# Patient Record
Sex: FEMALE | Race: WHITE | HISPANIC OR LATINO | URBAN - METROPOLITAN AREA
[De-identification: names, ages, dates, MRNs, and addresses within clinical notes are randomized per-mention and may not be internally consistent; named-entity substitution may affect disease eponyms.]

---

## 2024-09-09 ENCOUNTER — INPATIENT (INPATIENT)
Facility: HOSPITAL | Age: 44
LOS: 3 days | Discharge: HOME | DRG: 812 | End: 2024-09-13
Attending: INTERNAL MEDICINE | Admitting: INTERNAL MEDICINE
Payer: MEDICAID

## 2024-09-09 VITALS
TEMPERATURE: 98 F | RESPIRATION RATE: 19 BRPM | SYSTOLIC BLOOD PRESSURE: 221 MMHG | WEIGHT: 220.02 LBS | DIASTOLIC BLOOD PRESSURE: 71 MMHG | OXYGEN SATURATION: 100 % | HEART RATE: 108 BPM | HEIGHT: 62 IN

## 2024-09-09 DIAGNOSIS — R71.0 PRECIPITOUS DROP IN HEMATOCRIT: ICD-10-CM

## 2024-09-09 DIAGNOSIS — N92.0 EXCESSIVE AND FREQUENT MENSTRUATION WITH REGULAR CYCLE: ICD-10-CM

## 2024-09-09 DIAGNOSIS — D64.9 ANEMIA, UNSPECIFIED: ICD-10-CM

## 2024-09-09 DIAGNOSIS — R16.0 HEPATOMEGALY, NOT ELSEWHERE CLASSIFIED: ICD-10-CM

## 2024-09-09 LAB
ALBUMIN SERPL ELPH-MCNC: 3.7 G/DL — SIGNIFICANT CHANGE UP (ref 3.3–5)
ALP SERPL-CCNC: 62 U/L — SIGNIFICANT CHANGE UP (ref 40–120)
ALT FLD-CCNC: 16 U/L — SIGNIFICANT CHANGE UP (ref 10–45)
ANION GAP SERPL CALC-SCNC: 12 MMOL/L — SIGNIFICANT CHANGE UP (ref 5–17)
ANISOCYTOSIS BLD QL: SIGNIFICANT CHANGE UP
APTT BLD: 30.2 SEC — SIGNIFICANT CHANGE UP (ref 24.5–35.6)
AST SERPL-CCNC: 31 U/L — SIGNIFICANT CHANGE UP (ref 10–40)
BASOPHILS # BLD AUTO: 0 K/UL — SIGNIFICANT CHANGE UP (ref 0–0.2)
BASOPHILS # BLD AUTO: 0.05 K/UL — SIGNIFICANT CHANGE UP (ref 0–0.2)
BASOPHILS NFR BLD AUTO: 0 % — SIGNIFICANT CHANGE UP (ref 0–2)
BASOPHILS NFR BLD AUTO: 0.8 % — SIGNIFICANT CHANGE UP (ref 0–2)
BILIRUB SERPL-MCNC: 0.6 MG/DL — SIGNIFICANT CHANGE UP (ref 0.2–1.2)
BUN SERPL-MCNC: 14 MG/DL — SIGNIFICANT CHANGE UP (ref 7–23)
CALCIUM SERPL-MCNC: 8.3 MG/DL — LOW (ref 8.4–10.5)
CHLORIDE SERPL-SCNC: 103 MMOL/L — SIGNIFICANT CHANGE UP (ref 96–108)
CO2 SERPL-SCNC: 22 MMOL/L — SIGNIFICANT CHANGE UP (ref 22–31)
CREAT SERPL-MCNC: 0.72 MG/DL — SIGNIFICANT CHANGE UP (ref 0.5–1.3)
DACRYOCYTES BLD QL SMEAR: SLIGHT — SIGNIFICANT CHANGE UP
EGFR: 105 ML/MIN/1.73M2 — SIGNIFICANT CHANGE UP
ELLIPTOCYTES BLD QL SMEAR: SLIGHT — SIGNIFICANT CHANGE UP
EOSINOPHIL # BLD AUTO: 0.1 K/UL — SIGNIFICANT CHANGE UP (ref 0–0.5)
EOSINOPHIL # BLD AUTO: 0.17 K/UL — SIGNIFICANT CHANGE UP (ref 0–0.5)
EOSINOPHIL NFR BLD AUTO: 1.6 % — SIGNIFICANT CHANGE UP (ref 0–6)
EOSINOPHIL NFR BLD AUTO: 2.7 % — SIGNIFICANT CHANGE UP (ref 0–6)
GAS PNL BLDV: SIGNIFICANT CHANGE UP
GLUCOSE SERPL-MCNC: 227 MG/DL — HIGH (ref 70–99)
HCG SERPL-ACNC: <2 MIU/ML — SIGNIFICANT CHANGE UP
HCT VFR BLD CALC: 15.1 % — CRITICAL LOW (ref 34.5–45)
HCT VFR BLD CALC: 18.6 % — CRITICAL LOW (ref 34.5–45)
HCT VFR BLD CALC: 20.3 % — CRITICAL LOW (ref 34.5–45)
HGB BLD-MCNC: 3.7 G/DL — CRITICAL LOW (ref 11.5–15.5)
HGB BLD-MCNC: 5.2 G/DL — CRITICAL LOW (ref 11.5–15.5)
HGB BLD-MCNC: 5.6 G/DL — CRITICAL LOW (ref 11.5–15.5)
HYPOCHROMIA BLD QL: SIGNIFICANT CHANGE UP
IMM GRANULOCYTES NFR BLD AUTO: 1 % — HIGH (ref 0–0.9)
INR BLD: 1.39 RATIO — HIGH (ref 0.85–1.18)
LYMPHOCYTES # BLD AUTO: 0.96 K/UL — LOW (ref 1–3.3)
LYMPHOCYTES # BLD AUTO: 1.64 K/UL — SIGNIFICANT CHANGE UP (ref 1–3.3)
LYMPHOCYTES # BLD AUTO: 15 % — SIGNIFICANT CHANGE UP (ref 13–44)
LYMPHOCYTES # BLD AUTO: 26.3 % — SIGNIFICANT CHANGE UP (ref 13–44)
MAGNESIUM SERPL-MCNC: 2.2 MG/DL — SIGNIFICANT CHANGE UP (ref 1.6–2.6)
MANUAL SMEAR VERIFICATION: SIGNIFICANT CHANGE UP
MCHC RBC-ENTMCNC: 16.4 PG — LOW (ref 27–34)
MCHC RBC-ENTMCNC: 19.3 PG — LOW (ref 27–34)
MCHC RBC-ENTMCNC: 19.6 PG — LOW (ref 27–34)
MCHC RBC-ENTMCNC: 24.5 GM/DL — LOW (ref 32–36)
MCHC RBC-ENTMCNC: 27.6 GM/DL — LOW (ref 32–36)
MCHC RBC-ENTMCNC: 28 GM/DL — LOW (ref 32–36)
MCV RBC AUTO: 67.1 FL — LOW (ref 80–100)
MCV RBC AUTO: 70 FL — LOW (ref 80–100)
MCV RBC AUTO: 70.2 FL — LOW (ref 80–100)
MICROCYTES BLD QL: SIGNIFICANT CHANGE UP
MONOCYTES # BLD AUTO: 0.46 K/UL — SIGNIFICANT CHANGE UP (ref 0–0.9)
MONOCYTES # BLD AUTO: 0.85 K/UL — SIGNIFICANT CHANGE UP (ref 0–0.9)
MONOCYTES NFR BLD AUTO: 13.6 % — SIGNIFICANT CHANGE UP (ref 2–14)
MONOCYTES NFR BLD AUTO: 7.1 % — SIGNIFICANT CHANGE UP (ref 2–14)
NEUTROPHILS # BLD AUTO: 3.54 K/UL — SIGNIFICANT CHANGE UP (ref 1.8–7.4)
NEUTROPHILS # BLD AUTO: 4.84 K/UL — SIGNIFICANT CHANGE UP (ref 1.8–7.4)
NEUTROPHILS NFR BLD AUTO: 56.7 % — SIGNIFICANT CHANGE UP (ref 43–77)
NEUTROPHILS NFR BLD AUTO: 75.2 % — SIGNIFICANT CHANGE UP (ref 43–77)
NEUTS HYPERSEG # BLD: PRESENT — SIGNIFICANT CHANGE UP
NRBC # BLD: 0 /100 WBCS — SIGNIFICANT CHANGE UP (ref 0–0)
NRBC # BLD: 1 /100 WBCS — HIGH (ref 0–0)
NRBC # BLD: 1 /100 WBCS — HIGH (ref 0–0)
NT-PROBNP SERPL-SCNC: 991 PG/ML — HIGH (ref 0–300)
OB PNL STL: NEGATIVE — SIGNIFICANT CHANGE UP
OVALOCYTES BLD QL SMEAR: SIGNIFICANT CHANGE UP
PLAT MORPH BLD: NORMAL — SIGNIFICANT CHANGE UP
PLATELET # BLD AUTO: 379 K/UL — SIGNIFICANT CHANGE UP (ref 150–400)
PLATELET # BLD AUTO: 385 K/UL — SIGNIFICANT CHANGE UP (ref 150–400)
PLATELET # BLD AUTO: 405 K/UL — HIGH (ref 150–400)
POIKILOCYTOSIS BLD QL AUTO: SIGNIFICANT CHANGE UP
POTASSIUM SERPL-MCNC: 4.4 MMOL/L — SIGNIFICANT CHANGE UP (ref 3.5–5.3)
POTASSIUM SERPL-SCNC: 4.4 MMOL/L — SIGNIFICANT CHANGE UP (ref 3.5–5.3)
PROT SERPL-MCNC: 7.2 G/DL — SIGNIFICANT CHANGE UP (ref 6–8.3)
PROTHROM AB SERPL-ACNC: 15.1 SEC — HIGH (ref 9.5–13)
RBC # BLD: 2.25 M/UL — LOW (ref 3.8–5.2)
RBC # BLD: 2.65 M/UL — LOW (ref 3.8–5.2)
RBC # BLD: 2.9 M/UL — LOW (ref 3.8–5.2)
RBC # FLD: 18.8 % — HIGH (ref 10.3–14.5)
RBC # FLD: 21.2 % — HIGH (ref 10.3–14.5)
RBC # FLD: 21.2 % — HIGH (ref 10.3–14.5)
RBC BLD AUTO: ABNORMAL
SODIUM SERPL-SCNC: 137 MMOL/L — SIGNIFICANT CHANGE UP (ref 135–145)
STOMATOCYTES BLD QL SMEAR: SLIGHT — SIGNIFICANT CHANGE UP
TROPONIN T, HIGH SENSITIVITY RESULT: <6 NG/L — SIGNIFICANT CHANGE UP (ref 0–51)
WBC # BLD: 6.24 K/UL — SIGNIFICANT CHANGE UP (ref 3.8–10.5)
WBC # BLD: 6.43 K/UL — SIGNIFICANT CHANGE UP (ref 3.8–10.5)
WBC # BLD: 6.55 K/UL — SIGNIFICANT CHANGE UP (ref 3.8–10.5)
WBC # FLD AUTO: 6.24 K/UL — SIGNIFICANT CHANGE UP (ref 3.8–10.5)
WBC # FLD AUTO: 6.43 K/UL — SIGNIFICANT CHANGE UP (ref 3.8–10.5)
WBC # FLD AUTO: 6.55 K/UL — SIGNIFICANT CHANGE UP (ref 3.8–10.5)

## 2024-09-09 PROCEDURE — 99285 EMERGENCY DEPT VISIT HI MDM: CPT

## 2024-09-09 PROCEDURE — 70450 CT HEAD/BRAIN W/O DYE: CPT | Mod: 26,MC

## 2024-09-09 PROCEDURE — 71275 CT ANGIOGRAPHY CHEST: CPT | Mod: 26,MC

## 2024-09-09 PROCEDURE — 74174 CTA ABD&PLVS W/CONTRAST: CPT | Mod: 26,MC

## 2024-09-09 PROCEDURE — 72125 CT NECK SPINE W/O DYE: CPT | Mod: 26,MC

## 2024-09-09 PROCEDURE — 71045 X-RAY EXAM CHEST 1 VIEW: CPT | Mod: 26

## 2024-09-09 RX ORDER — ACETAMINOPHEN 325 MG/1
1000 TABLET ORAL ONCE
Refills: 0 | Status: COMPLETED | OUTPATIENT
Start: 2024-09-09 | End: 2024-09-09

## 2024-09-09 RX ADMIN — Medication 5 MILLIGRAM(S): at 10:00

## 2024-09-09 RX ADMIN — ACETAMINOPHEN 400 MILLIGRAM(S): 325 TABLET ORAL at 09:52

## 2024-09-09 NOTE — ED PROVIDER NOTE - ATTENDING CONTRIBUTION TO CARE
I, Keyur Chen MD, Emergency Medicine Attending Physician, personally saw and examined the patient and I personally made/approve the management plan and take responsibility for the patient management.    MDM: 44-year-old female with history of hypertension and pre-diabetes (not on medications, has not seen a physician for several years), who presents dyspnea on exertion, chest pain with radiation to the back, bilateral upper extremity tingling.  Patient had mechanical trip and fall over uneven sidewalk last Wednesday, but did not have any significant symptoms afterwards.  Patient states that on Friday she started having increased dyspnea exertion, and this morning she started have tingling to her upper extremities and chest pain.  Denies being on antiplatelets or anticoagulants.  Denies any pleuritic symptoms    ROS: denies LOC, syncope, head injury, neck pain, abdominal pain, back pain, weakness, vomiting, lightheadedness, vision changes, difficulty walking.    On examination, patient with significantly elevated blood pressure, mildly elevated heart rate, otherwise stable vitals. Cardiac examination with sinus tachycardia, lungs CTAB with no W/W/R.  ABD: Abdomen soft, non-tender and non-distended, no rebound, no guarding, no rigidity. No CVA tenderness.  There is no calf tenderness or leg swelling. Negative Reginald's sign.  Neurovascularly intact in all 4 extremities with 5/5 strength, normal sensation (including to the bilateral upper extremities), equal pulses and brisk capillary refill, soft compartments.  Cranial nerves III-XII intact, no pronator drift, normal speech and gait.    Due to patient's symptoms, will obtain CTA to evaluate for aortic dissection.  Bilateral blood pressure performed, 159/68 on the left, 155/69 on the right.  Will give patient labetalol due to elevated heart rate and blood pressure.  Given recent trauma, will obtain CT Head to evaluate for acute intracranial pathology, will obtain CT C-spine to evaluate for acute traumatic cervical spine injury.  Called CT at 9:45 AM to expedite imaging.    My independent interpretation of the EKG shows:  Normal sinus rhythm with rate of 92 bpm, incomplete RBBB, no ST elevations or depressions.  QTc 457.    Patient found to have significant anemia with hemoglobin 3.7 and hematocrit 15, has mild hyperglycemia to 27, troponin less than 6.  proBNP elevated 991, no lactate elevation.    Will give PRBC transfusion, patient denies any sites of active bleeding. I, Keyur Chen MD, Emergency Medicine Attending Physician, personally saw and examined the patient and I personally made/approve the management plan and take responsibility for the patient management.    MDM: 44-year-old female with history of hypertension and pre-diabetes (not on medications, has not seen a physician for several years), who presents dyspnea on exertion, chest pain with radiation to the back, bilateral upper extremity tingling.  Patient had mechanical trip and fall over uneven sidewalk last Wednesday, but did not have any significant symptoms afterwards.  Patient states that on Friday she started having increased dyspnea exertion, and this morning she started have tingling to her upper extremities and chest pain.  Denies being on antiplatelets or anticoagulants.  Denies any pleuritic symptoms    ROS: denies LOC, syncope, head injury, neck pain, abdominal pain, back pain, weakness, vomiting, lightheadedness, vision changes, difficulty walking.    On examination, patient with significantly elevated blood pressure, mildly elevated heart rate, otherwise stable vitals. Cardiac examination with sinus tachycardia, lungs CTAB with no W/W/R.  ABD: Abdomen soft, non-tender and non-distended, no rebound, no guarding, no rigidity. No CVA tenderness.  There is no calf tenderness or leg swelling. Negative Reginald's sign.  Neurovascularly intact in all 4 extremities with 5/5 strength, normal sensation (including to the bilateral upper extremities), equal pulses and brisk capillary refill, soft compartments.  Cranial nerves III-XII intact, no pronator drift, normal speech and gait.    Due to patient's symptoms, will obtain CTA to evaluate for aortic dissection.  Bilateral blood pressure performed, 159/68 on the left, 155/69 on the right.  Will give patient labetalol due to elevated heart rate and blood pressure.  Given recent trauma, will obtain CT Head to evaluate for acute intracranial pathology, will obtain CT C-spine to evaluate for acute traumatic cervical spine injury.  Called CT at 9:45 AM to expedite imaging.    My independent interpretation of the EKG shows:  Normal sinus rhythm with rate of 92 bpm, incomplete RBBB, no ST elevations or depressions.  QTc 457.    Patient found to have significant anemia with hemoglobin 3.7 and hematocrit 15, has mild hyperglycemia to 27, troponin less than 6.  proBNP elevated 991, no lactate elevation.    Will give PRBC transfusion, patient denies any sites of active bleeding. Rectal examination with no gross blood, no occult blood.  Chest x-ray with no focal consolidation.  CT showed no ICH, no acute cervical fracture, CTA chest and abdomen showed no evidence of aortic dissection, small pericardial effusion, trace bilateral pleural effusions, left hepatic lobe mass, right cardiophrenic angle nodule. I, Keyur Chen MD, Emergency Medicine Attending Physician, personally saw and examined the patient and I personally made/approve the management plan and take responsibility for the patient management.    MDM: 44-year-old female with history of hypertension and pre-diabetes (not on medications, has not seen a physician for several years), who presents dyspnea on exertion, chest pain with radiation to the back, bilateral upper extremity tingling.  Patient had mechanical trip and fall over uneven sidewalk last Wednesday, but did not have any significant symptoms afterwards.  Patient states that on Friday she started having increased dyspnea exertion, and this morning she started have tingling to her upper extremities and chest pain.  Denies being on antiplatelets or anticoagulants.  Denies any pleuritic symptoms    ROS: denies LOC, syncope, head injury, neck pain, abdominal pain, back pain, weakness, vomiting, lightheadedness, vision changes, difficulty walking.    On examination, patient with significantly elevated blood pressure, mildly elevated heart rate, otherwise stable vitals. Cardiac examination with sinus tachycardia, lungs CTAB with no W/W/R.  ABD: Abdomen soft, non-tender and non-distended, no rebound, no guarding, no rigidity. No CVA tenderness.  There is no calf tenderness or leg swelling. Negative Reginald's sign.  Neurovascularly intact in all 4 extremities with 5/5 strength, normal sensation (including to the bilateral upper extremities), equal pulses and brisk capillary refill, soft compartments.  Cranial nerves III-XII intact, no pronator drift, normal speech and gait.    Due to patient's symptoms, will obtain CTA to evaluate for aortic dissection.  Bilateral blood pressure performed, 159/68 on the left, 155/69 on the right.  Will give patient labetalol due to elevated heart rate and blood pressure.  Given recent trauma, will obtain CT Head to evaluate for acute intracranial pathology, will obtain CT C-spine to evaluate for acute traumatic cervical spine injury.  Called CT at 9:45 AM to expedite imaging.    My independent interpretation of the EKG shows:  Normal sinus rhythm with rate of 92 bpm, incomplete RBBB, no ST elevations or depressions.  QTc 457.    Patient found to have significant anemia with hemoglobin 3.7 and hematocrit 15, has mild hyperglycemia to 27, troponin less than 6.  proBNP elevated 991, no lactate elevation.    Will give PRBC transfusion, patient denies any sites of active bleeding. Rectal examination with no gross blood, no occult blood.  Chest x-ray with no focal consolidation.  CT showed no ICH, no acute cervical fracture, CTA chest and abdomen showed no evidence of aortic dissection, small pericardial effusion, trace bilateral pleural effusions, left hepatic lobe mass, right cardiophrenic angle nodule. Signed out at 1500 pending repeat CBC and close reassessments for further treatment and admission.

## 2024-09-09 NOTE — ED ADULT NURSE REASSESSMENT NOTE - NS ED NURSE REASSESS COMMENT FT1
RBC given. Consent in chart. Risks and benefits explained to patient. Patient verbalized understanding of risks and benefits. Patient aware of possible side effects. Vital signs stable. Second RN at bedside for confirmation.

## 2024-09-09 NOTE — ED ADULT NURSE NOTE - OBJECTIVE STATEMENT
45 y/o female with PMH of HTN, DM arrives to the ER complaining of SOB. Pt reports developing bilateral hand numbness, dizziness, headache SOB, chest pressure this morning. Reports feeling unwell last night checked her BP being  the systolic above 200 and her , reports taking 500 mg of metformin from her mother. Pt reports having a mechanical fall last Wednesday, breaking the fall with her hands. Denies head strike, LOC during fall. On assessment pt is well appearing, A&Ox4, following commands, neuro intact, speaking coherently, airway is patent, breathing spontaneously and unlabored. Skin is dry, warm. Abdomen is soft, no distended, no tender. Full ROM in all extremities, motor sensory intact, steady gait noted. Safety and comfort measures provided to keep patient safe. Bed placed in lowest position and side rails raised. 43 y/o female with PMH of HTN, DM arrives to the ER complaining of SOB. Pt reports developing bilateral hand numbness, dizziness, headache SOB, chest pressure this morning. Reports feeling unwell last night checked her BP being  the systolic above 200 and her , reports taking 850 mg of metformin from her mother. Pt reports having a mechanical fall last Wednesday, breaking the fall with her hands. Denies head strike, LOC during fall. On assessment pt is well appearing, A&Ox4, following commands, neuro intact, speaking coherently, airway is patent, breathing spontaneously and unlabored. Skin is dry, warm. Abdomen is soft, no distended, no tender. Full ROM in all extremities, motor sensory intact, steady gait noted. Safety and comfort measures provided to keep patient safe. Bed placed in lowest position and side rails raised.

## 2024-09-09 NOTE — ED PROVIDER NOTE - OBJECTIVE STATEMENT
44-year-old female with no known past medical history presenting with increased dyspnea with exertion, bilateral upper extremity numbness/tingling, chest pain radiating to the back.  Patient endorses she fell last Wednesday denies head strike or loss of consciousness.  On Friday she began having increased dyspnea with exertion, only able to walk a few steps before having to sit down and rest.  This morning she woke up with bilateral upper tingling and mild chest pain radiating to bilateral scapular area.  She denies nausea, vomiting, abdominal pain, calf pain, fever, cough, congestion.

## 2024-09-09 NOTE — ED PROVIDER NOTE - PHYSICAL EXAMINATION
GENERAL: Awake, alert, NAD  HEENT: NC/AT, moist mucous membranes, PERRL, EOMI  LUNGS: CTAB, no wheezes or crackles   CARDIAC: RRR, no m/r/g  ABDOMEN: Soft, non tender, non distended, no rebound, no guarding, no pulsating masses   BACK: No midline spinal tenderness, no CVA tenderness  EXT: 2+ pitting edema bilaterally, no calf tenderness, 2+ DP pulses bilaterally, no deformities.  NEURO: A&Ox3. CN II- XII Moving all extremities.  SKIN: Warm and dry. No rash.  PSYCH: Normal affect.

## 2024-09-09 NOTE — PATIENT PROFILE ADULT - FALL HARM RISK - HARM RISK INTERVENTIONS

## 2024-09-09 NOTE — ED ADULT TRIAGE NOTE - HEIGHT IN INCHES
Message  Received: Today   Pt Advice  Mariel Knapp Staff  Caller: [phone] (Today,  9:52 AM)  Who Called: PT   Regarding: returning call   Would the patient rather a call back or a response via [facility]? Call back   Best Call Back Number: [phone]   Additional Information:      2

## 2024-09-09 NOTE — ED ADULT TRIAGE NOTE - CHIEF COMPLAINT QUOTE
status post fall last week on wednesday.   Now complaining of chest pain, shortness of breath, BL hand numbness x this AM  Denies head strike, LOC during fall.  Hx HTN, DM

## 2024-09-09 NOTE — ED ADULT NURSE REASSESSMENT NOTE - NS ED NURSE REASSESS COMMENT FT1
Received patient from ROSA Bains, patient at baseline mental status, able to make needs known, NAD, VSS, patient agreeable to plan of care, pending bed assignment, comfort and safety provided.

## 2024-09-09 NOTE — ED PROVIDER NOTE - CLINICAL SUMMARY MEDICAL DECISION MAKING FREE TEXT BOX
44-year-old female no past medical history presenting with chest pain rating to the back, increased dyspnea on exertion, bilateral upper extremity tingling.  Initial BP was 221/71, repeat in bilateral upper extremities reveals 155 over 70s bilaterally.  Patient is also tachycardic to 108.  Exam significant for bilateral lower extremity 2+ pitting edema, normal heart and lung sounds, no pulsating masses on abdominal exam.  Presentation is concerning for aortic dissection with differentials including CHF, ACS, PE, CVA.  Will obtain CTa Aorta, CT head, CBC, CMP, trops, EKG will continue to assess and reevaluate.

## 2024-09-09 NOTE — ED PROVIDER NOTE - PROGRESS NOTE DETAILS
Vicky Steward DO (PGY-1)  H&H reveals a hemoglobin of 3.7, patient consented for blood transfusion.  Will transfuse 2 units of blood.  Will obtain Hemoccult to evaluate for source of anemia. Vicky Steward DO (PGY-1): Hgb increase to 5.2 after 2 units of blood, will admit to Dr. Russell.

## 2024-09-09 NOTE — ED ADULT NURSE NOTE - NSFALLRISKINTERV_ED_ALL_ED

## 2024-09-10 LAB
AFP-TM SERPL-MCNC: 2.2 NG/ML — SIGNIFICANT CHANGE UP
CEA SERPL-MCNC: 2.6 NG/ML — SIGNIFICANT CHANGE UP (ref 0–3.8)
FERRITIN SERPL-MCNC: 9 NG/ML — LOW (ref 15–150)
FOLATE SERPL-MCNC: 11.2 NG/ML — SIGNIFICANT CHANGE UP
FOLATE SERPL-MCNC: 11.5 NG/ML — SIGNIFICANT CHANGE UP
HAPTOGLOB SERPL-MCNC: 99 MG/DL — SIGNIFICANT CHANGE UP (ref 34–200)
HCT VFR BLD CALC: 24.9 % — LOW (ref 34.5–45)
HGB BLD-MCNC: 7 G/DL — CRITICAL LOW (ref 11.5–15.5)
IRON SATN MFR SERPL: 10 % — LOW (ref 14–50)
IRON SATN MFR SERPL: 38 UG/DL — SIGNIFICANT CHANGE UP (ref 30–160)
LDH SERPL L TO P-CCNC: 236 U/L — SIGNIFICANT CHANGE UP (ref 50–242)
MCHC RBC-ENTMCNC: 20.5 PG — LOW (ref 27–34)
MCHC RBC-ENTMCNC: 28.1 GM/DL — LOW (ref 32–36)
MCV RBC AUTO: 73 FL — LOW (ref 80–100)
NRBC # BLD: 1 /100 WBCS — HIGH (ref 0–0)
PLATELET # BLD AUTO: 385 K/UL — SIGNIFICANT CHANGE UP (ref 150–400)
RBC # BLD: 3.41 M/UL — LOW (ref 3.8–5.2)
RBC # FLD: 22.1 % — HIGH (ref 10.3–14.5)
TIBC SERPL-MCNC: 373 UG/DL — SIGNIFICANT CHANGE UP (ref 220–430)
TSH SERPL-MCNC: 7.98 UIU/ML — HIGH (ref 0.27–4.2)
UIBC SERPL-MCNC: 336 UG/DL — SIGNIFICANT CHANGE UP (ref 110–370)
VIT B12 SERPL-MCNC: 506 PG/ML — SIGNIFICANT CHANGE UP (ref 232–1245)
VIT B12 SERPL-MCNC: 526 PG/ML — SIGNIFICANT CHANGE UP (ref 232–1245)
WBC # BLD: 7.45 K/UL — SIGNIFICANT CHANGE UP (ref 3.8–10.5)
WBC # FLD AUTO: 7.45 K/UL — SIGNIFICANT CHANGE UP (ref 3.8–10.5)

## 2024-09-10 PROCEDURE — 74183 MRI ABD W/O CNTR FLWD CNTR: CPT | Mod: 26

## 2024-09-10 RX ORDER — IRON SUCROSE 20 MG/ML
200 INJECTION, SOLUTION INTRAVENOUS EVERY 24 HOURS
Refills: 0 | Status: DISCONTINUED | OUTPATIENT
Start: 2024-09-10 | End: 2024-09-13

## 2024-09-10 RX ADMIN — IRON SUCROSE 110 MILLIGRAM(S): 20 INJECTION, SOLUTION INTRAVENOUS at 18:20

## 2024-09-10 NOTE — CONSULT NOTE ADULT - NS ATTEND AMEND GEN_ALL_CORE FT
Patient seen and examined with the NP  I agree with her assessment and plan    patient with severe anemia due to menorrhagia  s/p PRBCs, would check anemia workup   may need IV iron  incidentally found with liver mass on CT A/P , pending MRI abdomen  will follow

## 2024-09-10 NOTE — PROGRESS NOTE ADULT - ASSESSMENT
44-year-old female with no known past medical history presenting with increased dyspnea with exertion, bilateral upper extremity numbness/tingling, chest pain radiating to the back.  Patient endorses she fell last Wednesday denies head strike or loss of consciousness.  On Friday she began having increased dyspnea with exertion, only able to walk a few steps before having to sit down and rest.  This morning she woke up with bilateral upper tingling and mild chest pain radiating to bilateral scapular area.  She denies nausea, vomiting, abdominal pain, calf pain, fever, cough, congestion.       Problem/Plan - 1:  ·  Problem: Severe anemia.   ·  Plan: PRBC to keep HGB >7G.  Hematology helping.      Problem/Plan - 2:  ·  Problem: Liver mass.   ·  Plan: Will get MRI and possible BX by IR if ? Malignancy .     Problem/Plan - 3:  ·  Problem: Menorrhagia.   ·  Plan: Out patient Gyn consult.

## 2024-09-10 NOTE — CONSULT NOTE ADULT - ASSESSMENT
44-year-old female with no known past medical history presenting with increased dyspnea with exertion, bilateral upper extremity numbness/tingling, chest pain radiating to the back.    r/o neoplasm  --CTA c/a/p w/ Left hepatic lobe 5 x 2.7 cm mass.  --MR a/p pending  --checking tumor markers: CEA, alpha fetoprotein  --will recommend IR consult for biopsy of liver mass  --plan of care pending above findings, d/w patient     Anemia  --2/2 LIZZETH, menorrhagia, and possibly neoplasm?   --Hgb 3.7 on admission--> 7 today, s/p 4u PRBCs since admission  --ferritin and %sat low at 8 and 9 respectively  --will order IV iron x 4  --trend daily CBCs, transfuse for Hgb <7, 8 if symptomatic        will follow,    Haleigh Hickey NP  Hematology/ Oncology  [facility]  [phone] (office)  [phone] (alt office)  Evenings and weekends please call MD on call or office   44-year-old female with no known past medical history presenting with increased dyspnea with exertion, bilateral upper extremity numbness/tingling, chest pain radiating to the back.    r/o neoplasm  --CTA c/a/p w/ Left hepatic lobe 5 x 2.7 cm mass.  --MR a/p pending  --checking tumor markers: CEA, alpha fetoprotein  --will recommend IR consult for biopsy of liver mass  --plan of care pending above findings, d/w patient     Anemia  --2/2 LIZZETH, menorrhagia, and possibly neoplasm?   --pt reports heavy menses lasting on avg 9 days   --Hgb 3.7 on admission--> 7 today, s/p 4u PRBCs since admission  --ferritin and %sat low at 8 and 9 respectively  --will order IV iron x 4  --trend daily CBCs, transfuse for Hgb <7, 8 if symptomatic        will follow,    Haleigh Hickey NP  Hematology/ Oncology  [facility]  [phone] (office)  [phone] (alt office)  Evenings and weekends please call MD on call or office   44-year-old female with no known past medical history presenting with increased dyspnea with exertion, bilateral upper extremity numbness/tingling, chest pain radiating to the back.    r/o neoplasm  --CTA c/a/p w/ Left hepatic lobe 5 x 2.7 cm mass.  --MR a/p pending  --checking tumor markers: CEA, alpha fetoprotein  -- may recommend IR consult for biopsy, if concern for malignancy on MRI abdomen  --plan of care pending above findings, d/w patient     Anemia  --2/2 LIZZETH, menorrhagia, and possibly neoplasm?   --pt reports heavy menses lasting on avg 9 days   --Hgb 3.7 on admission--> 7 today, s/p 4u PRBCs since admission  --ferritin and %sat low at 8 and 9 respectively  --will order IV iron x 4  --trend daily CBCs, transfuse for Hgb <7, 8 if symptomatic        will follow,    Haleigh Hickey NP  Hematology/ Oncology  [facility]  [phone] (office)  [phone] (alt office)  Evenings and weekends please call MD on call or office

## 2024-09-11 LAB
ANION GAP SERPL CALC-SCNC: 10 MMOL/L — SIGNIFICANT CHANGE UP (ref 5–17)
BUN SERPL-MCNC: 12 MG/DL — SIGNIFICANT CHANGE UP (ref 7–23)
CALCIUM SERPL-MCNC: 8.5 MG/DL — SIGNIFICANT CHANGE UP (ref 8.4–10.5)
CHLORIDE SERPL-SCNC: 106 MMOL/L — SIGNIFICANT CHANGE UP (ref 96–108)
CO2 SERPL-SCNC: 23 MMOL/L — SIGNIFICANT CHANGE UP (ref 22–31)
CREAT SERPL-MCNC: 0.76 MG/DL — SIGNIFICANT CHANGE UP (ref 0.5–1.3)
EGFR: 98 ML/MIN/1.73M2 — SIGNIFICANT CHANGE UP
FERRITIN SERPL-MCNC: 56 NG/ML — SIGNIFICANT CHANGE UP (ref 15–150)
GLUCOSE SERPL-MCNC: 80 MG/DL — SIGNIFICANT CHANGE UP (ref 70–99)
HCT VFR BLD CALC: 26.6 % — LOW (ref 34.5–45)
HGB BLD-MCNC: 7.7 G/DL — LOW (ref 11.5–15.5)
IRON SATN MFR SERPL: 159 UG/DL — SIGNIFICANT CHANGE UP (ref 30–160)
IRON SATN MFR SERPL: 46 % — SIGNIFICANT CHANGE UP (ref 14–50)
MCHC RBC-ENTMCNC: 21.8 PG — LOW (ref 27–34)
MCHC RBC-ENTMCNC: 28.9 GM/DL — LOW (ref 32–36)
MCV RBC AUTO: 75.4 FL — LOW (ref 80–100)
NRBC # BLD: 1 /100 WBCS — HIGH (ref 0–0)
PLATELET # BLD AUTO: 333 K/UL — SIGNIFICANT CHANGE UP (ref 150–400)
POTASSIUM SERPL-MCNC: 3.7 MMOL/L — SIGNIFICANT CHANGE UP (ref 3.5–5.3)
POTASSIUM SERPL-SCNC: 3.7 MMOL/L — SIGNIFICANT CHANGE UP (ref 3.5–5.3)
RBC # BLD: 3.53 M/UL — LOW (ref 3.8–5.2)
RBC # FLD: 22.4 % — HIGH (ref 10.3–14.5)
SODIUM SERPL-SCNC: 139 MMOL/L — SIGNIFICANT CHANGE UP (ref 135–145)
TIBC SERPL-MCNC: 347 UG/DL — SIGNIFICANT CHANGE UP (ref 220–430)
UIBC SERPL-MCNC: 188 UG/DL — SIGNIFICANT CHANGE UP (ref 110–370)
WBC # BLD: 6.58 K/UL — SIGNIFICANT CHANGE UP (ref 3.8–10.5)
WBC # FLD AUTO: 6.58 K/UL — SIGNIFICANT CHANGE UP (ref 3.8–10.5)

## 2024-09-11 PROCEDURE — 76830 TRANSVAGINAL US NON-OB: CPT | Mod: 26

## 2024-09-11 PROCEDURE — 93975 VASCULAR STUDY: CPT | Mod: 26

## 2024-09-11 RX ADMIN — IRON SUCROSE 110 MILLIGRAM(S): 20 INJECTION, SOLUTION INTRAVENOUS at 16:56

## 2024-09-11 NOTE — CONSULT NOTE ADULT - SUBJECTIVE AND OBJECTIVE BOX
Interventional Radiology    Evaluate for Procedure: Liver mass biopsy    HPI: 44-year-old female with no known past medical history presenting with increased dyspnea with exertion, bilateral upper extremity numbness/tingling, chest pain radiating to the back.  Patient endorses she fell last Wednesday denies head strike or loss of consciousness.  On Friday she began having increased dyspnea with exertion, only able to walk a few steps before having to sit down and rest.  This morning she woke up with bilateral upper tingling and mild chest pain radiating to bilateral scapular area.  IR now consulted for a liver mass biopsy seen on MRI abdomen in the left hepatic lobe measuring 5 x 2.7 cm .    Allergies: No Known Allergies    Medications (Abx/Cardiac/Anticoagulation/Blood Products)      Data:    T(C): 36.9  HR: 77  BP: 155/76  RR: 18  SpO2: 97%    -WBC 6.58 / HgB 7.7 / Hct 26.6 / Plt 333  -Na 139 / Cl 106 / BUN 12 / Glucose 80  -K 3.7 / CO2 23 / Cr 0.76  -ALT -- / Alk Phos -- / T.Bili --  -INR 1.39 / PTT 30.2    Radiology: Reviewed    Assessment/Plan:   44-year-old female with no known past medical history presenting with increased dyspnea with exertion, bilateral upper extremity numbness/tingling, chest pain radiating to the back.  Patient endorses she fell last Wednesday denies head strike or loss of consciousness.  On Friday she began having increased dyspnea with exertion, only able to walk a few steps before having to sit down and rest.  This morning she woke up with bilateral upper tingling and mild chest pain radiating to bilateral scapular area.  IR now consulted for a liver mass biopsy seen on MRI abdomen in the left hepatic lobe measuring 5 x 2.7 cm .    - case and imaging reviewed with Dr. Cisneros  - recommend GYN consult for endometrial sampling first as this is likely the primary ca  - if sampling inconclusive can opt for liver biopsy however recommend outpatient biopsy  - to schedule outpatient liver biopsy the patient may call the IR booking office @ [phone]  - d/w primary team      --  Jeff Robles NP  Interventional Radiology  Available on [employer] TE[employer] / [employer]    For EMERGENT inquiries/questions:  IR Pager ([facility]): [phone]    For non-emergent consults/questions:   Please place a sunrise order "Consult- Interventional Radiology" with an appropriate callback number    For questions about scheduling during appropriate work hours, call IR :  [facility]: [phone]    For outpatient IR booking:  [facility]: [phone]  
GYN Consult Note    44y G0 LMP 8/20 with history of heavy, irregular menses presents with symptomatic anemia.  Patient states that she presented to the ED with shortness of breath, dizziness, and "numb hands", found to be anemic with H/H 3.7/15.1.  Patient has since received 4uPRBC with resolution in presenting symptoms and rise in H/H to 7.7/26.6 this AM.  Reports that she was not having any vaginal bleeding at the time of symptom onset and presentation to the hospital.  She states she has a history of irregular menses that has recently become monthly after losing weight.  Reports menses last approximately 1 week with the first 1-2 days heavy (requiring 4 pads/day) and the remainder of days lighter (2 pads/day).  Endorses spotting on the last 2 days of menses.  Denies clotting or cramping with menses.  Denies intermenstrual bleeding.  Patient states she noted some spotting this AM, and believes it may be the start of her menses, although she is not yet due for her period.  Patient's mother started menopause at age 47.  At this time patient denies lightheadedness, dizziness, chest pain, shortness of breath, nausea, vomiting.        OB/GYN HISTORY:   Physician: Has not seen an OBGYN since 2010 - previously saw [facility] at [address] [place] [address]  Ob: G0  Gyn: Reports history of "line in her uterus" that was removed with an office procedure many years ago and informed it was benign.  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  Patient did not endorse taking any medications.  TVUS from 2010 wnl showing 5mm endometrial lining.  Denies fibroids, cysts, abnormal pap smears, STIs  PMH: HTN, anemia (previously on PO iron x4 months 1 year ago but self discontinued because she felt they weren't working), prediabetes   PSH: Denies  Meds: Denies  Allergies: NKDA      Vital Signs Last 24 Hrs  T(C): 36.9 (11 Sep 2024 05:55), Max: 36.9 (11 Sep 2024 05:55)  T(F): 98.4 (11 Sep 2024 05:55), Max: 98.4 (11 Sep 2024 05:55)  HR: 77 (11 Sep 2024 05:55) (77 - 86)  BP: 155/76 (11 Sep 2024 05:55) (145/79 - 155/76)  BP(mean): --  RR: 18 (11 Sep 2024 05:55) (18 - 18)  SpO2: 97% (11 Sep 2024 05:55) (97% - 97%)    Parameters below as of 11 Sep 2024 05:55  Patient On (Oxygen Delivery Method): room air      PHYSICAL EXAM:   Gen: NAD, obese, alert and oriented x 3  Respiratory: breathing comfortably on RA  Abd: soft, non tender, non-distended  Pelvic: exam limited due to patient discomfort and difficulty positioning in hospital bed.  On speculum exam: small amount of pooling of dark red blood in vaginal vault without increased accumulation with valsalva.  Cervix unable to be visualized  on speculum exam.  Uterus/cervix/adnexa unable to be palpated on bimanual exam due to patient habitus and discomfort.   Extremities: non-tender b/l, no edema   Skin: warm and well perfused  *Pelvic exam performed with chaperone present: ROSA Thakkar    LABS:                        7.7    6.58  )-----------( 333      ( 11 Sep 2024 07:18 )             26.6     09-11    139  |  106  |  12  ----------------------------<  80  3.7   |  23  |  0.76    Ca    8.5      11 Sep 2024 07:17        Urinalysis Basic - ( 11 Sep 2024 07:17 )    Color: x / Appearance: x / SG: x / pH: x  Gluc: 80 mg/dL / Ketone: x  / Bili: x / Urobili: x   Blood: x / Protein: x / Nitrite: x   Leuk Esterase: x / RBC: x / WBC x   Sq Epi: x / Non Sq Epi: x / Bacteria: x        RADIOLOGY & ADDITIONAL STUDIES:    ACC: [id number] EXAM:  MR ABDOMEN WAW IC   ORDERED BY:  CINDY REYES     PROCEDURE DATE:  09/10/2024          INTERPRETATION:  CLINICAL INFORMATION: Liver mass on CT. Anemia.   Menorrhagia.    COMPARISON: CT chest, abdomen, and pelvis 9/9/2024.    CONTRAST/COMPLICATIONS:  IV Contrast: Gadavist  10 cc administered   0 cc discarded  Oral Contrast: NONE  Complications: None reported at time of study completion    PROCEDURE:  MRI of the abdomen was performed.  MRCP was performed.    FINDINGS:  LOWER CHEST: Small bilateral pleural effusions.    LIVER: Enlarged, the right lobe measuring 20.5 cm craniocaudally.   Multiple masses within the left hepatic lobe. The largest dominant mass   within segment 3 measures 5.4 x 3.8 cm, which demonstrates heterogeneous   mild to moderate T2 signal. This dominant mass also demonstrates early   irregular peripheral enhancement with progressive irregular filling by   the delayed phase, as well as regions of restricted diffusion. Additional   enhancing masses within the left lobe measure up to 2.6 x 2.6 cm (series   11 image 22), which also demonstrates restricted diffusion, and several   demonstrate a targetoid appearance. There is a subcentimeter focus of   restricted diffusion within the right hepatic lobe (series 23 image 88),   without a clear correlate seen on additional sequences.  BILE DUCTS: Normal caliber.  GALLBLADDER: Contracted gallbladder with wall thickening.  SPLEEN: Enlarged, measures 15.3 cm.  PANCREAS: Within normal limits.  ADRENALS: Within normal limits.  KIDNEYS/URETERS: Within normal limits.    VISUALIZED PORTIONS:  BOWEL: Within normal limits.  REPRODUCTIVE ORGANS: Partially imaged uterus demonstrating thickening of   the endometrium, imaged portions demonstrating a thickness of 2.7 cm.  PERITONEUM: Enhancing nodular focus with restricted diffusion measuring   0.8 x 0.7 cm inseparable from the anterior margin of the left hepatic   lobe (series 15 image 29), concerning for a pericapsular implant or   direct extension of liver tumor. Mild peritoneal nodularity in the   anterior midline upper abdomen (series 11 images 31-33).  VESSELS: Within normal limits.  RETROPERITONEUM/LYMPH NODES: Periportal, peripancreatic, and   gastrohepatic lymphadenopathy. For reference, a periportal lymph node   measures 4.7 x 2.4 cm (series 5 image 29). Small retroperitoneal lymph   nodes, with left para-aortic example measuring 1.2 x 0.9 cm (series 17   image 63). Right cardiophrenic lymph node measuring 1.4 cm (series 17   image 13).  ABDOMINAL WALL: Diffuse subcutaneous edema.  BONES: Within normal limits.    IMPRESSION:  *  Partially imaged uterus with endometrial thickening. Recommend further   evaluation with pelvic ultrasound and gynecological consultation.  *  Multiple liver lesions, concerning for malignancy. Dominant left   hepatic lobe mass measuring 5.4 cm, for which differential includes a   primary hepatobiliary lesion with consideration given to   cholangiocarcinoma or metastasis. Additional lesions seen in the left   hepatic lobe are suspicious for metastases. Nonspecific subcentimeter   focus of restricted diffusion in the right hepatic lobe without definite   correlate seen on additional sequences, possibly an additional metastasis.  *  Abdominal lymphadenopathy and prominent retroperitoneal lymph nodes.   Enlarged right cardiophrenic lymph node.  *  Subcentimeter nodule inseparable from the anterior margin of the left   hepatic lobe, concerning for a pericapsular implant versus exophytic   extension of liver tumor. Mild peritoneal nodularity in the anterior   midline upper abdomen. Peritoneal carcinomatosis is not excluded.  *  Hepatosplenomegaly.  *  Contracted gallbladder with nonspecific wall thickening.  *  Small bilateral pleural effusions.      --- End of Report ---          SUKUMAR VELEZ DO; Resident Radiologist  This document has been electronically signed.  NIKO YIP MD; Attending Radiologist  This document has been electronically signed. Sep 11 2024 11:52AM            ACC: [id number] EXAM:  US DPLX PELVIC   ORDERED BY: KEYSHAWN ANTHONY     ACC: [id number] EXAM:  US TRANSVAGINAL   ORDERED BY: KEYSHAWN ANTHONY     PROCEDURE DATE:  09/11/2024          INTERPRETATION:  CLINICAL INFORMATION: Menorrhagia.    LMP: 08/23/2024    COMPARISON: Pelvic ultrasound 6/8/2011. MR abdomen 9/10/2024. CT abdomen   pelvis 9/9/2024.    TECHNIQUE:  Endovaginal and transabdominal pelvic sonogram. Color and Spectral   Doppler was performed.    FINDINGS:  Uterus: 8.8 cm x 6.1 cm x 6.6 cm. Within normal limits.  Endometrium: 36 mm. Heterogenous and thickened with increased vascularity.    Right ovary: 2.4 cm x 2.6 cm x 2.5 cm. Within normal limits.  Left ovary: 2.9 cm x 1.5 cm x 2.0 cm. Within normal limits.    Fluid: Trace fluid in the cul-de-sac.    IMPRESSION:  Thickened and heterogenous endometrium with increased vascularity, which   could be seen in setting of endometrial hyperplasia/dysplasia/cancer.  Definitely recommend gynecologic consultation for endometrial sampling.      --- End of Report ---          GIOVANNI ALLEN MD; Resident Radiologist  This document has been electronically signed.  CRISTINA VALDOVINOS MD; Attending Radiologist  This document has been electronically signed. Sep 11 2024  3:09PM
Reason for consult: liver mass    HPI:  44-year-old female with no known past medical history presenting with increased dyspnea with exertion, bilateral upper extremity numbness/tingling, chest pain radiating to the back.  Patient endorses she fell last Wednesday denies head strike or loss of consciousness.  On Friday she began having increased dyspnea with exertion, only able to walk a few steps before having to sit down and rest.  This morning she woke up with bilateral upper tingling and mild chest pain radiating to bilateral scapular area.  She denies nausea, vomiting, abdominal pain, calf pain, fever, cough, congestion. (09 Sep 2024 17:28)    Hematology/Oncology called to see patient d/t profound anemia and a mass noted on CT a/p c/f neoplasm     PAST MEDICAL & SURGICAL HISTORY:  No pertinent past medical history      No significant past surgical history          FAMILY HISTORY:      Alochol: Denied  Smoking: Nonsmoker  Drug Use: Denied  Marital Status:         Allergies    No Known Allergies    Intolerances        MEDICATIONS  (STANDING):    MEDICATIONS  (PRN):      ROS  No fever, sweats, chills  No epistaxis, HA, sore throat  No CP, SOB, cough, sputum  No n/v/d, abd pain, melena, hematochezia  No edema  No rash  No anxiety  No back pain, joint pain  No bleeding, bruising  No dysuria, hematuria    T(C): 36.8 (09-10-24 @ 11:52), Max: 37.2 (09-10-24 @ 02:39)  HR: 84 (09-10-24 @ 11:52) (80 - 94)  BP: 166/83 (09-10-24 @ 11:52) (136/78 - 175/82)  RR: 18 (09-10-24 @ 11:52) (18 - 24)  SpO2: 100% (09-10-24 @ 11:52) (96% - 100%)  Wt(kg): --    PE  NAD  Awake, alert  Anicteric, MMM  RRR  CTAB  Abd soft, NT, ND  No c/c/e  No rash grossly  FROM                          7.0    7.45  )-----------( 385      ( 10 Sep 2024 07:12 )             24.9       09-09    137  |  103  |  14  ----------------------------<  227<H>  4.4   |  22  |  0.72    Ca    8.3<L>      09 Sep 2024 09:58  Mg     2.2     09-09    TPro  7.2  /  Alb  3.7  /  TBili  0.6  /  DBili  x   /  AST  31  /  ALT  16  /  AlkPhos  62  09-09    ACC: [id number] EXAM:  CT ANGIO ABD PELV (W)AW IC   ORDERED BY: BRODIE FREEMAN     ACC: [id number] EXAM:  CT ANGIO CHEST AORTA WAWIC   ORDERED BY: BRODIE FREEMAN     PROCEDURE DATE:  09/09/2024          INTERPRETATION:  CLINICAL INFORMATION: Chest pain radiating to the back   and dyspnea on exertion with hypertension. Found to be severely anemic.   Evaluate for aortic dissection.    COMPARISON: CT abdomen pelvis 3/13/2007.    CONTRAST/COMPLICATIONS:  IV Contrast: Omnipaque 350  90 cc administered   10 cc discarded  Oral Contrast: NONE  Complications: None reported at time of study completion    PROCEDURE:  CT Angiography of the Chest, Abdomen and Pelvis.  Gated precontrast imaging was performed through the heart followed by   gated CT Angiography of the heart with subsequent non-gated arterial   phase imaging of the chest, abdomen and pelvis.  Sagittal and coronal reformats were performed as well as 3D (MIP)   reconstructions.    FINDINGS:  CHEST:  LUNGS AND LARGE AIRWAYS: Patent central airways. Interlobular septal   thickening bilaterally. No focal consolidation.  PLEURA: Trace bilateral pleural effusions.  VESSELS: Normal caliber aorta. No evidence of aortic dissection.  HEART: Heart size is normal. Small pericardial effusion.  MEDIASTINUM AND LINDA: Right cardiophrenic angle node measures 1.3 cm.  CHEST WALL AND LOWER NECK: Within normal limits.    ABDOMEN AND PELVIS:  LIVER: Ill-defined mass in the left hepatic lobe lateral segment   measuring 5 x 2.7 cm (image 113, series 302).  BILE DUCTS: Normal caliber.  GALLBLADDER: Within normal limits.  SPLEEN: Within normal limits.  PANCREAS: Within normal limits.  ADRENALS: Within normal limits.  KIDNEYS/URETERS: Within normal limits.    BLADDER: Within normal limits.  REPRODUCTIVE ORGANS: Uterus and adnexa within normal limits.    BOWEL: No bowel obstruction. Appendicolith without appendiceal thickening   or inflammation.  PERITONEUM/RETROPERITONEUM: Within normal limits.  VESSELS: Within normal limits.  LYMPH NODES: No lymphadenopathy.  ABDOMINAL WALL: Anasarca.  BONES: Mild degenerative changes.    IMPRESSION:    No evidence of aortic dissection.    Small pericardial effusion.    Mild volume overload on the basis of interlobular septal thickening and   trace bilateral pleural effusions.    Left hepatic lobe 5 x 2.7 cm mass. MRI abdomen recommended to further   assess.    Right cardiophrenic angle 1.3 cm node is indeterminate.    --- End of Report ---

## 2024-09-11 NOTE — CONSULT NOTE ADULT - ATTENDING COMMENTS
43 yo G0 who presented on Monday with shortness of breath and fatigue and found to be severely anemic. Hb has appropriately risen with blood transfusions. Upon workup she was noted to have a liver mass. She has a long history of abnormally irregular periods occurring every 2-3 months as well as hirsutism. We discussed that she meets criteria for PCOS as well as what the diagnosis means. We discussed her risk factors for endometrial hyperplasia and cancer including PCOS and elevated BMI. We discussed her options for endometrial sampling including inpatient EMB, outpatient EMB, or hysteroscopy D&C. She is motivated to have a tissue sample as soon as possible to rule out malignancy. Plan to attempt EMB while inpatient. She also has not seen a GYN in almost 15 years and we discussed the need for outpatient follow up and an annual visit in addition. All questions and concerns addressed. She expressed understanding and agreement with the plan.     Tanvi Marie MD 45 yo G0 who presented on Monday with shortness of breath and fatigue and found to be severely anemic. Hb has appropriately risen with blood transfusions. Upon workup she was noted to have a liver mass. She has a long history of abnormally irregular periods occurring every 2-3 months as well as hirsutism. We discussed that she meets criteria for PCOS as well as what the diagnosis means. We discussed her risk factors for endometrial hyperplasia and cancer including PCOS and elevated BMI. We discussed her options for endometrial sampling including inpatient EMB, outpatient EMB, or hysteroscopy D&C. She is motivated to have a tissue sample as soon as possible to rule out malignancy. Plan to attempt EMB while inpatient. She also has not seen a GYN in almost 15 years and we discussed the need for outpatient follow up and an annual visit in addition. All questions and concerns addressed. She expressed understanding and agreement with the plan.     Tanvi Marie MD    Addendum    I returned to the patient's bedside with Corinne López, PGY-2 to complete a pap and endometrial biopsy, Procedure reviewed along with risks, benefits, and alternatives. Written and verbal consent was obtained. At the bedside, a speculum was placed in the vagina and visualization of the cervix was limited. I placed the speculum in upside down and was able to see a piece of tissue that appeared to be protruding from the cervix. The specimen was grasped with a ring forcep and placed In the appropriate container to be sent to pathology. due to discomfort with the exam and inability to adequately visualize the cervix, a blind pap smear was collected. Plan to follow up labs. Will schedule patient for follow up visit with GYN in 1-2 weeks after discharge. All question sand concerns addressed.     Tanvi Marie MD

## 2024-09-11 NOTE — PROGRESS NOTE ADULT - ASSESSMENT
44-year-old female with no known past medical history presenting with increased dyspnea with exertion, bilateral upper extremity numbness/tingling, chest pain radiating to the back.  Patient endorses she fell last Wednesday denies head strike or loss of consciousness.  On Friday she began having increased dyspnea with exertion, only able to walk a few steps before having to sit down and rest.  This morning she woke up with bilateral upper tingling and mild chest pain radiating to bilateral scapular area.  She denies nausea, vomiting, abdominal pain, calf pain, fever, cough, congestion.       Problem/Plan - 1:  ·  Problem: Severe anemia.   ·  Plan: PRBC to keep HGB >7G.  Hematology helping.      Problem/Plan - 2:  ·  Problem: Liver mass primary VS metastatic Cancer.   ·  Plan: Awaiting  BX by IR   Oncology following .   < from: MR Abdomen w/wo IV Cont (09.10.24 @ 19:50) >    IMPRESSION:  *  Partially imaged uterus with endometrial thickening. Recommend further   evaluation with pelvic ultrasound and gynecological consultation.  *  Multiple liver lesions, concerning for malignancy. Dominant left   hepatic lobe mass measuring 5.4 cm, for which differential includes a   primary hepatobiliary lesion with consideration given to   cholangiocarcinoma or metastasis. Additional lesions seen in the left  hepatic lobe are suspicious for metastases. Nonspecific subcentimeter   focus of restricted diffusion in the right hepatic lobe without definite   correlate seen on additional sequences, possibly an additional metastasis.  *  Abdominal lymphadenopathy and prominent retroperitoneal lymph nodes.   Enlarged right cardiophrenic lymph node.  *  Subcentimeter nodule inseparable from the anterior margin of the left   hepatic lobe, concerning for a pericapsular implant versus exophytic   extension of liver tumor. Mild peritoneal nodularity in the anterior   midline upper abdomen. Peritoneal carcinomatosis is not excluded.  *  Hepatosplenomegaly.  *  Contracted gallbladder with nonspecific wall thickening.  *  Small bilateral pleural effusions.    < end of copied text >   Problem/Plan - 3:  ·  Problem: Menorrhagia with endometrial hyperplasia/dysplasia/cancer..   ·  Plan: Needs In patient endometrial Bx so Gyn consulted.  < from: US Doppler Pelvis (09.11.24 @ 12:29) >  IMPRESSION:  Thickened and heterogenous endometrium with increased vascularity, which   could be seen in setting of endometrial hyperplasia/dysplasia/cancer.  Definitely recommend gynecologic consultation for endometrial sampling.    < end of copied text >

## 2024-09-11 NOTE — CONSULT NOTE ADULT - ASSESSMENT
44y G0 LMP 8/20 with history of heavy, irregular menses presents with symptomatic anemia, now s/p 4uPRBC with appropriate rise in H/H and resolution of symptoms of anemia.  Imaging concerning for enlarged uterus with thickened endometrium 36 mm with heterogenous and thickened with increased vascularity.  Patient without bleeding on initial presentation to ED, however now with spotting this AM and small amount of dark red blood pooling in vagina on speculum exam, however full pelvic exam unable to be completed limited to patient discomfort and habitus.  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  Differential includes perimenopausal bleeding vs endometrial cancer.    Recommendations:  - Patient will likely require endometrial biopsy to rule out endometrial cancer, however this is typically performed in outpatient setting  - Agree with PRBC transfusions for symptomatic anemia  - Final recommendations pending evaluation by attending physician    **INCOMPLETE NOTE**  SHAUN Nguyen PGY3 44y G0 LMP 8/20 with history of heavy, irregular menses presents with symptomatic anemia, now s/p 4uPRBC with appropriate rise in H/H and resolution of symptoms of anemia.  Imaging concerning for enlarged uterus with thickened endometrium 36 mm with heterogenous and thickened with increased vascularity.  Patient without bleeding on initial presentation to ED, however now with spotting this AM and small amount of dark red blood pooling in vagina on speculum exam, however full pelvic exam unable to be completed limited to patient discomfort and habitus.  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  Differential includes perimenopausal bleeding vs endometrial cancer.    Recommendations:  - Patient will likely require endometrial biopsy to rule out endometrial cancer, however this is typically performed in outpatient setting  - Agree with PRBC transfusions for symptomatic anemia  - Final recommendations pending evaluation by attending physician    d/w Dr. Marie, Gyn Attending  SHAUN Nguyen PGY3

## 2024-09-12 ENCOUNTER — RESULT REVIEW (OUTPATIENT)
Age: 44
End: 2024-09-12

## 2024-09-12 ENCOUNTER — TRANSCRIPTION ENCOUNTER (OUTPATIENT)
Age: 44
End: 2024-09-12

## 2024-09-12 PROBLEM — Z78.9 OTHER SPECIFIED HEALTH STATUS: Chronic | Status: ACTIVE | Noted: 2024-09-09

## 2024-09-12 LAB
CANCER AG125 SERPL-ACNC: 33 U/ML — SIGNIFICANT CHANGE UP
CANCER AG19-9 SERPL-ACNC: 12 U/ML — SIGNIFICANT CHANGE UP
HCT VFR BLD CALC: 29 % — LOW (ref 34.5–45)
HGB BLD-MCNC: 8.4 G/DL — LOW (ref 11.5–15.5)
MCHC RBC-ENTMCNC: 22.7 PG — LOW (ref 27–34)
MCHC RBC-ENTMCNC: 29 GM/DL — LOW (ref 32–36)
MCV RBC AUTO: 78.4 FL — LOW (ref 80–100)
NRBC # BLD: 1 /100 WBCS — HIGH (ref 0–0)
PLATELET # BLD AUTO: 323 K/UL — SIGNIFICANT CHANGE UP (ref 150–400)
RBC # BLD: 3.7 M/UL — LOW (ref 3.8–5.2)
RBC # FLD: 24.1 % — HIGH (ref 10.3–14.5)
WBC # BLD: 6.73 K/UL — SIGNIFICANT CHANGE UP (ref 3.8–10.5)
WBC # FLD AUTO: 6.73 K/UL — SIGNIFICANT CHANGE UP (ref 3.8–10.5)

## 2024-09-12 PROCEDURE — 88305 TISSUE EXAM BY PATHOLOGIST: CPT | Mod: 26

## 2024-09-12 RX ADMIN — IRON SUCROSE 110 MILLIGRAM(S): 20 INJECTION, SOLUTION INTRAVENOUS at 17:58

## 2024-09-12 NOTE — DISCHARGE NOTE PROVIDER - NSDCFUADDAPPT_GEN_ALL_CORE_FT
Interventional Radiology    You have a telehealth appointment scheduled with Dr. Timmy Nguyen on Tuesday, 9/17/24 at 3:15 PM. You will receive a link through your email. If you have any questions, please call . Interventional Radiology    You have a telehealth appointment scheduled on Wednesday, 9/17/24 at 10:30 AM. You will receive a link through your email. If you have any questions, please call . Interventional Radiology    You have a telehealth appointment scheduled on Tuesday, 9/17/24 at 1:00 PM. You will receive a link through your email. If you have any questions, please call . You must follow up with your Hematologist/Oncologist, Dr. Hardy, within one week of discharge - please call to make an appointment.    You must follow up with your primary medical doctor within 2-3 days of discharge - please call to make an appointment.  If you don't have one, you can follow up at the [facility] - please call [phone] to make an appointment.    Interventional Radiology    You have a telehealth appointment scheduled on Tuesday, 9/17/24 at 1:00 PM. You will receive a link through your email. If you have any questions, please call . You must follow up with your Hematologist/Oncologist, Dr. Hardy, within one week of discharge - please call to make an appointment.    You must follow up with a gynecologist within one week of discharge.  Since you do not have one, please call [facility] Gynecology and Obstetrics at [phone] to make an appointment.    You must follow up with your primary medical doctor within 2-3 days of discharge - please call to make an appointment.  If you don't have one, you can follow up at the [facility] - please call [phone] to make an appointment.      APPTS ARE READY TO BE MADE: [X] YES    Best Family or Patient Contact (if needed):    Additional Information about above appointments (if needed):    1: Hematology/Oncology  2: Gynecology  3: Primary medical doctor    Other comments or requests:       Interventional Radiology    You have a telehealth appointment scheduled on Tuesday, 9/17/24 at 1:00 PM. You will receive a link through your email. If you have any questions, please call . Interventional Radiology:  You have a telehealth appointment scheduled on Tuesday, 9/17/24 at 1:00 PM. You will receive a link through your email. If you have any questions, please call .      You must follow up with your Hematologist/Oncologist, Dr. Hardy, within one week of discharge - please call to make an appointment.    You must follow up with a gynecologist within one week of discharge.  Since you do not have one, please call [facility] Gynecology and Obstetrics at [phone] to make an appointment.    You must follow up with your primary medical doctor within 2-3 days of discharge - please call to make an appointment.  If you don't have one, you can follow up at the [facility] - please call [phone] to make an appointment.                APPTS ARE READY TO BE MADE: [X] YES    Best Family or Patient Contact (if needed):    Additional Information about above appointments (if needed):    1: Hematology/Oncology  2: Gynecology  3: Primary medical doctor    Other comments or requests:        Interventional Radiology:  You have a telehealth appointment scheduled on Tuesday, 9/17/24 at 1:00 PM. You will receive a link through your email. If you have any questions, please call .      You must follow up with your Hematologist/Oncologist, Dr. Hardy, within one week of discharge - please call to make an appointment.    You must follow up with a gynecologist within one week of discharge.  Since you do not have one, please call [facility] Gynecology and Obstetrics at [phone] to make an appointment.    You must follow up with your primary medical doctor within 2-3 days of discharge - please call to make an appointment.  If you don't have one, you can follow up at the [facility] - please call [phone] to make an appointment.                APPTS ARE READY TO BE MADE: [X] YES    Best Family or Patient Contact (if needed):    Additional Information about above appointments (if needed):    1: Hematology/Oncology  2: Gynecology  3: Primary medical doctor    Other comments or requests:       9/16-Prior to outreaching the patient, it was visible that the patient has secured a follow up appointment which was not scheduled by our team with Timmy Nguyen  on 09/17/2024 [facility] INTERVEN [address] Scheduled       .9/16-Appointment was scheduled by our team on the patient's behalf through the provider's office on 10/22 at 3:30 p.m.  patient was place in cancellation list [address] [place] [address]. patient was provided Dr. Abdulaziz BACK sooner appointment however patient declined due to distance.

## 2024-09-12 NOTE — PROGRESS NOTE ADULT - ASSESSMENT
44-year-old female with no known past medical history presenting with increased dyspnea with exertion, bilateral upper extremity numbness/tingling, chest pain radiating to the back.  Patient endorses she fell last Wednesday denies head strike or loss of consciousness.  On Friday she began having increased dyspnea with exertion, only able to walk a few steps before having to sit down and rest.  This morning she woke up with bilateral upper tingling and mild chest pain radiating to bilateral scapular area.  She denies nausea, vomiting, abdominal pain, calf pain, fever, cough, congestion.       Problem/Plan - 1:  ·  Problem: Severe anemia.   ·  Plan: PRBC to keep HGB >7G.  Hematology helping.      Problem/Plan - 2:  ·  Problem: Liver mass primary VS metastatic Cancer.   ·  Plan: Awaiting  BX by IR 9/13  Oncology following .   < from: MR Abdomen w/wo IV Cont (09.10.24 @ 19:50) >    IMPRESSION:  *  Partially imaged uterus with endometrial thickening. Recommend further   evaluation with pelvic ultrasound and gynecological consultation.  *  Multiple liver lesions, concerning for malignancy. Dominant left   hepatic lobe mass measuring 5.4 cm, for which differential includes a   primary hepatobiliary lesion with consideration given to   cholangiocarcinoma or metastasis. Additional lesions seen in the left  hepatic lobe are suspicious for metastases. Nonspecific subcentimeter   focus of restricted diffusion in the right hepatic lobe without definite   correlate seen on additional sequences, possibly an additional metastasis.  *  Abdominal lymphadenopathy and prominent retroperitoneal lymph nodes.   Enlarged right cardiophrenic lymph node.  *  Subcentimeter nodule inseparable from the anterior margin of the left   hepatic lobe, concerning for a pericapsular implant versus exophytic   extension of liver tumor. Mild peritoneal nodularity in the anterior   midline upper abdomen. Peritoneal carcinomatosis is not excluded.  *  Hepatosplenomegaly.  *  Contracted gallbladder with nonspecific wall thickening.  *  Small bilateral pleural effusions.     Problem/Plan - 3:  ·  Problem: Menorrhagia with endometrial hyperplasia/dysplasia/cancer..   ·  Plan: S/P  endometrial Bx so out patient F/U with  Gyn .  < from: US Doppler Pelvis (09.11.24 @ 12:29) >  IMPRESSION:  Thickened and heterogenous endometrium with increased vascularity, which   could be seen in setting of endometrial hyperplasia/dysplasia/cancer.  Definitely recommend gynecologic consultation for endometrial sampling.    < end of copied text >      Dispo : Dc planning after BX to F/U with ONCOLOGY & GYNECOLOGY .

## 2024-09-12 NOTE — PROGRESS NOTE ADULT - ASSESSMENT
44-year-old female with no known past medical history presenting with increased dyspnea with exertion, bilateral upper extremity numbness/tingling, chest pain radiating to the back.    r/o neoplasm  --CTA c/a/p w/ Left hepatic lobe 5 x 2.7 cm mass.  --MR a/p w/ Partially imaged uterus with endometrial thickening. Multiple liver lesions, concerning for malignancy. Dominant left hepatic lobe mass measuring 5.4 cm, for which differential includes a primary hepatobiliary lesion with consideration given to cholangiocarcinoma or metastasis. Abdominal lymphadenopathy and prominent retroperitoneal lymph nodes. Enlarged right cardiophrenic lymph node. Hepatosplenomegaly.  --CEA/alpha fetoprotein normal. Checking Ca 19-9 and Ca 125  --IR eval, planning 9/13 liver bx    Anemia  --2/2 LIZZETH, menorrhagia, and possibly neoplasm  --pt reports heavy menses lasting on avg 9 days   --S/p 4 units pRBC  --ferritin and %sat low at 8 and 9 respectively  --Continues iron infusions x 4  --trend daily CBCs, transfuse for Hgb <7, 8 if symptomatic      Patient will f/u with Dr. Nicholson at [facility].    Chiki Hilton PA-C  Hematology/Oncology  [facility]  [phone] (office)

## 2024-09-12 NOTE — DISCHARGE NOTE PROVIDER - HOSPITAL COURSE
HPI:  44-year-old female with no known past medical history presenting with increased dyspnea with exertion, bilateral upper extremity numbness/tingling, chest pain radiating to the back.  Patient endorses she fell last Wednesday denies head strike or loss of consciousness.  On Friday she began having increased dyspnea with exertion, only able to walk a few steps before having to sit down and rest.  This morning she woke up with bilateral upper tingling and mild chest pain radiating to bilateral scapular area.  She denies nausea, vomiting, abdominal pain, calf pain, fever, cough, congestion. (09 Sep 2024 17:28)    Hospital Course:      Important Medication Changes and Reason:    Active or Pending Issues Requiring Follow-up:  You must follow up with Interventional Radiology on Tuesday, September 17, 2024 to schedule your liver biopsy.  You must follow up with gynecology within one week to discuss the results of your endometrial biopsy  You must follow up with your hematologist/oncologist within one week of discharge.    Advanced Directives:   [ ] Full code  [ ] DNR  [ ] Hospice    Discharge Diagnoses:  Severe anemia  Liver lesions  Thickened endometrium         HPI:  44-year-old female with no known past medical history presenting with increased dyspnea with exertion, bilateral upper extremity numbness/tingling, chest pain radiating to the back.  Patient endorses she fell last Wednesday denies head strike or loss of consciousness.  On Friday she began having increased dyspnea with exertion, only able to walk a few steps before having to sit down and rest.  This morning she woke up with bilateral upper tingling and mild chest pain radiating to bilateral scapular area.  She denies nausea, vomiting, abdominal pain, calf pain, fever, cough, congestion. (09 Sep 2024 17:28)    Hospital Course:  Hemoglobin was noted to be 3.7 on admission.  Patient received four units of PRBCs.  CBC monitored and stable.  MR Abdomen w/wo IV Cont -   IMPRESSION:  *  Partially imaged uterus with endometrial thickening. Recommend further   evaluation with pelvic ultrasound and gynecological consultation.  *  Multiple liver lesions, concerning for malignancy. Dominant left   hepatic lobe mass measuring 5.4 cm, for which differential includes a   primary hepatobiliary lesion with consideration given to   cholangiocarcinoma or metastasis. Additional lesions seen in the left  hepatic lobe are suspicious for metastases. Nonspecific subcentimeter   focus of restricted diffusion in the right hepatic lobe without definite   correlate seen on additional sequences, possibly an additional metastasis.  *  Abdominal lymphadenopathy and prominent retroperitoneal lymph nodes.   Enlarged right cardiophrenic lymph node.  *  Subcentimeter nodule inseparable from the anterior margin of the left   hepatic lobe, concerning for a pericapsular implant versus exophytic   extension of liver tumor. Mild peritoneal nodularity in the anterior   midline upper abdomen. Peritoneal carcinomatosis is not excluded.  *  Hepatosplenomegaly.  *  Contracted gallbladder with nonspecific wall thickening.  *  Small bilateral pleural effusions.    US Doppler Pelvis  IMPRESSION:  Thickened and heterogenous endometrium with increased vascularity, which   could be seen in setting of endometrial hyperplasia/dysplasia/cancer.  Definitely recommend gynecologic consultation for endometrial sampling.    Menorrhagia with endometrial hyperplasia/dysplasia/cancer - GYN consulted.  S/P  endometrial Bx on September 12, 2024; will need outpatient F/U with  Gyn .  Interventional Radiology consulted - patient will get liver biopsy outpatient.  Heme/Onc consulted - patient will need to follow up outpatient.    Active or Pending Issues Requiring Follow-up:  You must follow up with Interventional Radiology on Tuesday, September 17, 2024 to schedule your liver biopsy.  You must follow up with gynecology within one week to discuss the results of your endometrial biopsy  You must follow up with your hematologist/oncologist within one week of discharge.    Advanced Directives:   [ ] Full code  [ ] DNR  [ ] Hospice    Discharge Diagnoses:  Severe anemia  Liver lesions  Thickened endometrium

## 2024-09-12 NOTE — DISCHARGE NOTE PROVIDER - NSDCFUSCHEDAPPT_GEN_ALL_CORE_FT
Cheko Nuno  [facility]  [address]VEN [address]  Scheduled Appointment: 09/18/2024     [facility]  INTERVEN [address]  Scheduled Appointment: 09/17/2024     Timmy Nguyen  [facility]  INTERVEN [address]  Scheduled Appointment: 09/17/2024    [facility]  INTMED  [facility]   Scheduled Appointment: 10/22/2024

## 2024-09-12 NOTE — DISCHARGE NOTE PROVIDER - NSFOLLOWUPCLINICS_GEN_ALL_ED_FT
[facility] - Primary Care  Primary Care  [address] [place] [address]  Phone: [phone]  Fax:   Follow Up Time: 1-3 days     [facility] - Primary Care  Primary Care  [address] [address]  Phone: [phone]  Fax:   Follow Up Time: 1-3 days    [facility] Gynecology and Obstetrics  Gynceology/OB  [address] [place] [address]  Phone: [phone]  Fax:   Follow Up Time: 1 week

## 2024-09-12 NOTE — DISCHARGE NOTE PROVIDER - CARE PROVIDER_API CALL
Edgar Hardy  [address] [address]  Phone: [phone]  Fax: [phone]  Follow Up Time: 1 week   Edgar Hardy  Hematology  [address] [place] [address]  Phone: [phone]  Fax: [phone]  Follow Up Time: 1 week    Timmy Nguyen  Interventional Radiology and Diagnostic Radiology  [address] [place] [address]  Phone: [phone]  Fax: [phone]  Follow Up Time: 1 week

## 2024-09-12 NOTE — POST DISCHARGE NOTE - NOTIFICATION:
Notified Dr. Andrew Russell of patient's MRI findings.  Place an order in [place] to "Consults- Cancer Care Direct Navigation" for assistance in outpatient care.

## 2024-09-12 NOTE — DISCHARGE NOTE PROVIDER - NSDCCPCAREPLAN_GEN_ALL_CORE_FT
PRINCIPAL DISCHARGE DIAGNOSIS  Diagnosis: Severe anemia  Assessment and Plan of Treatment: You received 4 units of packed red blood cells during this admission.  You must follow up with Hematology/Oncology within one week of discharge - please call to make an appointment.      SECONDARY DISCHARGE DIAGNOSES  Diagnosis: Liver mass  Assessment and Plan of Treatment: You have a telehealth appointment scheduled with Interventional Radiology on September 17, 2024.  At this appointment, you will schedule your liver biopsy.    Diagnosis: Menorrhagia  Assessment and Plan of Treatment: Stable  You must follow up at the gynecology clinic within one week of discharge - please call to make an appointment.    Diagnosis: Thickened endometrium  Assessment and Plan of Treatment: You had an endometrial biopsy performed during this admission.  You must follow up at the gynecology clinic within one week of discharge - please call to make this appointment.  At this appointment, you must discuss the biopsy results, as well as the need for further testing/a treatment plan.

## 2024-09-12 NOTE — DISCHARGE NOTE PROVIDER - PROVIDER TOKENS
PROVIDER:[TOKEN:[[id number]],FOLLOWUP:[1 week]] PROVIDER:[TOKEN:[[id number]],FOLLOWUP:[1 week]],PROVIDER:[TOKEN:[[id number]],FOLLOWUP:[1 week]]

## 2024-09-12 NOTE — CHART NOTE - NSCHARTNOTEFT_GEN_A_CORE
Case discussed with Dr. Jason Cisneros, will add pt on tomorrow, 9/13/24, for liver bx.    - please place IR procedure order under Jason Cisneros MD  - STAT AM labs (CBC, CMP, Coags, T&S x 2)  - please continue holding all AC unless strictly contraindicated  - d/w primary team Case discussed with Dr. Jason Cisneros, will add pt on tomorrow, 9/13/24, for liver bx.    - please place IR procedure order under Jason Cisneros MD  - STAT AM labs (CBC, CMP, Coags, T&S x 2)  - please continue holding all AC unless strictly contraindicated  - NPO at 11PM on 9/12/24  - d/w primary team

## 2024-09-13 ENCOUNTER — TRANSCRIPTION ENCOUNTER (OUTPATIENT)
Age: 44
End: 2024-09-13

## 2024-09-13 VITALS
HEART RATE: 71 BPM | RESPIRATION RATE: 18 BRPM | OXYGEN SATURATION: 97 % | SYSTOLIC BLOOD PRESSURE: 167 MMHG | DIASTOLIC BLOOD PRESSURE: 88 MMHG | TEMPERATURE: 98 F

## 2024-09-13 LAB
ALBUMIN SERPL ELPH-MCNC: 3.5 G/DL — SIGNIFICANT CHANGE UP (ref 3.3–5)
ALP SERPL-CCNC: 57 U/L — SIGNIFICANT CHANGE UP (ref 40–120)
ALT FLD-CCNC: 14 U/L — SIGNIFICANT CHANGE UP (ref 10–45)
ANION GAP SERPL CALC-SCNC: 12 MMOL/L — SIGNIFICANT CHANGE UP (ref 5–17)
AST SERPL-CCNC: 24 U/L — SIGNIFICANT CHANGE UP (ref 10–40)
BILIRUB SERPL-MCNC: 0.6 MG/DL — SIGNIFICANT CHANGE UP (ref 0.2–1.2)
BLD GP AB SCN SERPL QL: NEGATIVE — SIGNIFICANT CHANGE UP
BUN SERPL-MCNC: 11 MG/DL — SIGNIFICANT CHANGE UP (ref 7–23)
CALCIUM SERPL-MCNC: 8.5 MG/DL — SIGNIFICANT CHANGE UP (ref 8.4–10.5)
CHLORIDE SERPL-SCNC: 108 MMOL/L — SIGNIFICANT CHANGE UP (ref 96–108)
CO2 SERPL-SCNC: 23 MMOL/L — SIGNIFICANT CHANGE UP (ref 22–31)
CREAT SERPL-MCNC: 0.78 MG/DL — SIGNIFICANT CHANGE UP (ref 0.5–1.3)
EGFR: 95 ML/MIN/1.73M2 — SIGNIFICANT CHANGE UP
GLUCOSE SERPL-MCNC: 93 MG/DL — SIGNIFICANT CHANGE UP (ref 70–99)
HCT VFR BLD CALC: 30.2 % — LOW (ref 34.5–45)
HGB BLD-MCNC: 8.5 G/DL — LOW (ref 11.5–15.5)
INR BLD: 1.34 RATIO — HIGH (ref 0.85–1.18)
MCHC RBC-ENTMCNC: 22.4 PG — LOW (ref 27–34)
MCHC RBC-ENTMCNC: 28.1 GM/DL — LOW (ref 32–36)
MCV RBC AUTO: 79.5 FL — LOW (ref 80–100)
NRBC # BLD: 0 /100 WBCS — SIGNIFICANT CHANGE UP (ref 0–0)
PLATELET # BLD AUTO: 316 K/UL — SIGNIFICANT CHANGE UP (ref 150–400)
POTASSIUM SERPL-MCNC: 3.7 MMOL/L — SIGNIFICANT CHANGE UP (ref 3.5–5.3)
POTASSIUM SERPL-SCNC: 3.7 MMOL/L — SIGNIFICANT CHANGE UP (ref 3.5–5.3)
PROT SERPL-MCNC: 7 G/DL — SIGNIFICANT CHANGE UP (ref 6–8.3)
PROTHROM AB SERPL-ACNC: 14 SEC — HIGH (ref 9.5–13)
RBC # BLD: 3.8 M/UL — SIGNIFICANT CHANGE UP (ref 3.8–5.2)
RBC # FLD: 26.1 % — HIGH (ref 10.3–14.5)
RH IG SCN BLD-IMP: POSITIVE — SIGNIFICANT CHANGE UP
SODIUM SERPL-SCNC: 143 MMOL/L — SIGNIFICANT CHANGE UP (ref 135–145)
WBC # BLD: 6.6 K/UL — SIGNIFICANT CHANGE UP (ref 3.8–10.5)
WBC # FLD AUTO: 6.6 K/UL — SIGNIFICANT CHANGE UP (ref 3.8–10.5)

## 2024-09-13 PROCEDURE — 84132 ASSAY OF SERUM POTASSIUM: CPT

## 2024-09-13 PROCEDURE — 96375 TX/PRO/DX INJ NEW DRUG ADDON: CPT

## 2024-09-13 PROCEDURE — 36430 TRANSFUSION BLD/BLD COMPNT: CPT

## 2024-09-13 PROCEDURE — 99285 EMERGENCY DEPT VISIT HI MDM: CPT

## 2024-09-13 PROCEDURE — 36415 COLL VENOUS BLD VENIPUNCTURE: CPT

## 2024-09-13 PROCEDURE — 83605 ASSAY OF LACTIC ACID: CPT

## 2024-09-13 PROCEDURE — 84702 CHORIONIC GONADOTROPIN TEST: CPT

## 2024-09-13 PROCEDURE — 87624 HPV HI-RISK TYP POOLED RSLT: CPT

## 2024-09-13 PROCEDURE — 82947 ASSAY GLUCOSE BLOOD QUANT: CPT

## 2024-09-13 PROCEDURE — 84484 ASSAY OF TROPONIN QUANT: CPT

## 2024-09-13 PROCEDURE — 76830 TRANSVAGINAL US NON-OB: CPT

## 2024-09-13 PROCEDURE — 87625 HPV TYPES 16 & 18 ONLY: CPT

## 2024-09-13 PROCEDURE — 70450 CT HEAD/BRAIN W/O DYE: CPT | Mod: MC

## 2024-09-13 PROCEDURE — A9585: CPT

## 2024-09-13 PROCEDURE — 93975 VASCULAR STUDY: CPT

## 2024-09-13 PROCEDURE — 86850 RBC ANTIBODY SCREEN: CPT

## 2024-09-13 PROCEDURE — 86901 BLOOD TYPING SEROLOGIC RH(D): CPT

## 2024-09-13 PROCEDURE — 86923 COMPATIBILITY TEST ELECTRIC: CPT

## 2024-09-13 PROCEDURE — 84443 ASSAY THYROID STIM HORMONE: CPT

## 2024-09-13 PROCEDURE — 85730 THROMBOPLASTIN TIME PARTIAL: CPT

## 2024-09-13 PROCEDURE — 71045 X-RAY EXAM CHEST 1 VIEW: CPT

## 2024-09-13 PROCEDURE — 85027 COMPLETE CBC AUTOMATED: CPT

## 2024-09-13 PROCEDURE — 74174 CTA ABD&PLVS W/CONTRAST: CPT | Mod: MC

## 2024-09-13 PROCEDURE — 84295 ASSAY OF SERUM SODIUM: CPT

## 2024-09-13 PROCEDURE — P9016: CPT

## 2024-09-13 PROCEDURE — 82272 OCCULT BLD FECES 1-3 TESTS: CPT

## 2024-09-13 PROCEDURE — 88305 TISSUE EXAM BY PATHOLOGIST: CPT

## 2024-09-13 PROCEDURE — 86301 IMMUNOASSAY TUMOR CA 19-9: CPT

## 2024-09-13 PROCEDURE — 82435 ASSAY OF BLOOD CHLORIDE: CPT

## 2024-09-13 PROCEDURE — 83010 ASSAY OF HAPTOGLOBIN QUANT: CPT

## 2024-09-13 PROCEDURE — 85025 COMPLETE CBC W/AUTO DIFF WBC: CPT

## 2024-09-13 PROCEDURE — 93005 ELECTROCARDIOGRAM TRACING: CPT

## 2024-09-13 PROCEDURE — 85018 HEMOGLOBIN: CPT

## 2024-09-13 PROCEDURE — 85014 HEMATOCRIT: CPT

## 2024-09-13 PROCEDURE — 82746 ASSAY OF FOLIC ACID SERUM: CPT

## 2024-09-13 PROCEDURE — 86304 IMMUNOASSAY TUMOR CA 125: CPT

## 2024-09-13 PROCEDURE — 82330 ASSAY OF CALCIUM: CPT

## 2024-09-13 PROCEDURE — 82728 ASSAY OF FERRITIN: CPT

## 2024-09-13 PROCEDURE — 96374 THER/PROPH/DIAG INJ IV PUSH: CPT

## 2024-09-13 PROCEDURE — 71275 CT ANGIOGRAPHY CHEST: CPT | Mod: MC

## 2024-09-13 PROCEDURE — 83550 IRON BINDING TEST: CPT

## 2024-09-13 PROCEDURE — 82607 VITAMIN B-12: CPT

## 2024-09-13 PROCEDURE — 82803 BLOOD GASES ANY COMBINATION: CPT

## 2024-09-13 PROCEDURE — 82105 ALPHA-FETOPROTEIN SERUM: CPT

## 2024-09-13 PROCEDURE — 80053 COMPREHEN METABOLIC PANEL: CPT

## 2024-09-13 PROCEDURE — 83540 ASSAY OF IRON: CPT

## 2024-09-13 PROCEDURE — 83615 LACTATE (LD) (LDH) ENZYME: CPT

## 2024-09-13 PROCEDURE — 85610 PROTHROMBIN TIME: CPT

## 2024-09-13 PROCEDURE — 74183 MRI ABD W/O CNTR FLWD CNTR: CPT | Mod: MC

## 2024-09-13 PROCEDURE — 72125 CT NECK SPINE W/O DYE: CPT | Mod: MC

## 2024-09-13 PROCEDURE — 86900 BLOOD TYPING SEROLOGIC ABO: CPT

## 2024-09-13 PROCEDURE — 83880 ASSAY OF NATRIURETIC PEPTIDE: CPT

## 2024-09-13 PROCEDURE — 82378 CARCINOEMBRYONIC ANTIGEN: CPT

## 2024-09-13 PROCEDURE — 83735 ASSAY OF MAGNESIUM: CPT

## 2024-09-13 PROCEDURE — 80048 BASIC METABOLIC PNL TOTAL CA: CPT

## 2024-09-13 NOTE — PROGRESS NOTE ADULT - ASSESSMENT
44-year-old female with no known past medical history presenting with increased dyspnea with exertion, bilateral upper extremity numbness/tingling, chest pain radiating to the back.    r/o neoplasm  --CTA c/a/p w/ Left hepatic lobe 5 x 2.7 cm mass.  --MR a/p w/ Partially imaged uterus with endometrial thickening. Multiple liver lesions, concerning for malignancy. Dominant left hepatic lobe mass measuring 5.4 cm, for which differential includes a primary hepatobiliary lesion with consideration given to cholangiocarcinoma or metastasis. Abdominal lymphadenopathy and prominent retroperitoneal lymph nodes. Enlarged right cardiophrenic lymph node. Hepatosplenomegaly.  --CEA/alpha fetoprotein normal. Checking Ca 19-9 and Ca 125  --IR arranging for liver biopsy today; may be d/c and follow up results as outpatient    Anemia  --2/2 LIZZETH, menorrhagia, and possibly neoplasm  --pt reports heavy menses lasting on avg 9 days   --S/p 4 units pRBC  --ferritin and %sat low at 8 and 9 respectively  --To complete iron infusions today x 4  --trend daily CBCs, transfuse for Hgb <7, 8 if symptomatic      Patient may f/u with Dr. Hardy at [facility] at [address] [place] [address]; [phone].    Chiki Hilton PA-C  Hematology/Oncology  [facility]  [phone] (office) 44-year-old female with no known past medical history presenting with increased dyspnea with exertion, bilateral upper extremity numbness/tingling, chest pain radiating to the back.    r/o neoplasm  --CTA c/a/p w/ Left hepatic lobe 5 x 2.7 cm mass.  --MR a/p w/ Partially imaged uterus with endometrial thickening. Multiple liver lesions, concerning for malignancy. Dominant left hepatic lobe mass measuring 5.4 cm, for which differential includes a primary hepatobiliary lesion with consideration given to cholangiocarcinoma or metastasis. Abdominal lymphadenopathy and prominent retroperitoneal lymph nodes. Enlarged right cardiophrenic lymph node. Hepatosplenomegaly.  --CEA, alpha fetoprotein, Ca 19-9 and Ca 125 normal  --IR arranging for liver biopsy today; may be d/c and follow up results as outpatient  --Gyn following, completed pap and endometrial biopsy, plan for outpatient f/u    Anemia  --2/2 LIZZETH, menorrhagia, and possibly neoplasm  --pt reports heavy menses lasting on avg 9 days   --S/p 4 units pRBC  --ferritin and %sat low at 8 and 9 respectively  --To complete iron infusions today x 4  --trend daily CBCs, transfuse for Hgb <7, 8 if symptomatic      Patient may f/u with Dr. Hardy at [facility] at [address] [place] [address]; [phone].    Chiki Hilton PA-C  Hematology/Oncology  [facility]  [phone] (office)

## 2024-09-13 NOTE — PROGRESS NOTE ADULT - NS ATTEND AMEND GEN_ALL_CORE FT
Agree with above assessment and plan.   Discussed with IR regarding liver biopsy- able to do outpatient early next week. If medically optimized can be discharged from oncologic perspective for further work up and evaluation outpatient.
Agree with above assessment and plan.   Outpatient IR guided biopsy of liver. Management and plan will be based on biopsy results. Patient can follow up in clinic after discharge. She [place] in [place] [facility] for her to travel.

## 2024-09-13 NOTE — DISCHARGE NOTE NURSING/CASE MANAGEMENT/SOCIAL WORK - NSDCPEFALRISK_GEN_ALL_CORE
For information on Fall & Injury Prevention, visit: [email or web address] OR  [email or web address] OR  https://www.cdc.gov/steadi/patient.html

## 2024-09-13 NOTE — CHART NOTE - NSCHARTNOTEFT_GEN_A_CORE
Request from Dr. Russell to facilitate patient discharge.  Medication reconciliation reviewed, revised, and resolved with Dr. Russell, who has medically cleared patient for discharge with follow up as advised.  Please refer to discharge note for detailed hospital course.

## 2024-09-13 NOTE — DISCHARGE NOTE NURSING/CASE MANAGEMENT/SOCIAL WORK - PATIENT PORTAL LINK FT
You can access the FollowMyHealth Patient Portal offered by [facility] by registering at the following website: [email or web address]. By joining [employer]’s FollowMyHealth portal, you will also be able to view your health information using other applications (apps) compatible with our system.

## 2024-09-13 NOTE — PROGRESS NOTE ADULT - SUBJECTIVE AND OBJECTIVE BOX
Date of Service  : 09-12-24     INTERVAL HPI/OVERNIGHT EVENTS: I feel fine and want to go after Bx  Vital Signs Last 24 Hrs  T(C): 36.8 (12 Sep 2024 20:05), Max: 36.8 (12 Sep 2024 04:45)  T(F): 98.3 (12 Sep 2024 20:05), Max: 98.3 (12 Sep 2024 12:49)  HR: 68 (12 Sep 2024 20:05) (68 - 74)  BP: 168/88 (12 Sep 2024 20:05) (154/82 - 168/88)  BP(mean): --  RR: 17 (12 Sep 2024 20:05) (17 - 18)  SpO2: 97% (12 Sep 2024 20:05) (97% - 98%)    Parameters below as of 12 Sep 2024 20:05  Patient On (Oxygen Delivery Method): room air      I&O's Summary    11 Sep 2024 07:01  -  12 Sep 2024 07:00  --------------------------------------------------------  IN: 780 mL / OUT: 0 mL / NET: 780 mL    12 Sep 2024 07:01  -  12 Sep 2024 20:28  --------------------------------------------------------  IN: 480 mL / OUT: 0 mL / NET: 480 mL      MEDICATIONS  (STANDING):  iron sucrose IVPB 200 milliGRAM(s) IV Intermittent every 24 hours    MEDICATIONS  (PRN):    LABS:                        8.4    6.73  )-----------( 323      ( 12 Sep 2024 07:34 )             29.0     09-11    139  |  106  |  12  ----------------------------<  80  3.7   |  23  |  0.76    Ca    8.5      11 Sep 2024 07:17        Urinalysis Basic - ( 11 Sep 2024 07:17 )    Color: x / Appearance: x / SG: x / pH: x  Gluc: 80 mg/dL / Ketone: x  / Bili: x / Urobili: x   Blood: x / Protein: x / Nitrite: x   Leuk Esterase: x / RBC: x / WBC x   Sq Epi: x / Non Sq Epi: x / Bacteria: x      CAPILLARY BLOOD GLUCOSE            Urinalysis Basic - ( 11 Sep 2024 07:17 )    Color: x / Appearance: x / SG: x / pH: x  Gluc: 80 mg/dL / Ketone: x  / Bili: x / Urobili: x   Blood: x / Protein: x / Nitrite: x   Leuk Esterase: x / RBC: x / WBC x   Sq Epi: x / Non Sq Epi: x / Bacteria: x      REVIEW OF SYSTEMS:  CONSTITUTIONAL: No fever, weight loss, or fatigue  EYES: No eye pain, visual disturbances, or discharge  ENMT:  No difficulty hearing, tinnitus, vertigo; No sinus or throat pain  NECK: No pain or stiffness  RESPIRATORY: No cough, wheezing, chills or hemoptysis; No shortness of breath  CARDIOVASCULAR: No chest pain, palpitations, dizziness, or leg swelling  GASTROINTESTINAL: No abdominal or epigastric pain. No nausea, vomiting, or hematemesis; No diarrhea or constipation. No melena or hematochezia.  GENITOURINARY: No dysuria, frequency, hematuria, or incontinence  NEUROLOGICAL: No headaches, memory loss, loss of strength, numbness, or tremors    RADIOLOGY & ADDITIONAL TESTS:    Consultant(s) Notes Reviewed:  [x ] YES  [ ] NO    PHYSICAL EXAM:  GENERAL: NAD, well-groomed, well-developed,not in any distress ,  HEAD:  Atraumatic, Normocephalic  EYES: EOMI, PERRLA, conjunctiva and sclera clear  ENMT: No tonsillar erythema, exudates, or enlargement; Moist mucous membranes, Good dentition, No lesions  NECK: Supple, No JVD, Normal thyroid  NERVOUS SYSTEM:  Alert & Oriented X3, No focal deficit   CHEST/LUNG: Good air entry bilateral with no  rales, rhonchi, wheezing, or rubs  HEART: Regular rate and rhythm; No murmurs, rubs, or gallops  ABDOMEN: Soft, Nontender, Nondistended; Bowel sounds present  EXTREMITIES:  2+ Peripheral Pulses, No clubbing, cyanosis, or edema  SKIN: No rashes or lesions    Care Discussed with Consultants/Other Providers [ x] YES  [ ] NO
Patient is a 44y old  Female who presents with a chief complaint of SOB (12 Sep 2024 13:31)    Pt seen and examined at bedside, no acute complaints.  MEDICATIONS  (STANDING):  iron sucrose IVPB 200 milliGRAM(s) IV Intermittent every 24 hours    MEDICATIONS  (PRN):    Vital Signs Last 24 Hrs  T(C): 36.8 (12 Sep 2024 12:49), Max: 36.9 (11 Sep 2024 20:25)  T(F): 98.3 (12 Sep 2024 12:49), Max: 98.5 (11 Sep 2024 20:25)  HR: 74 (12 Sep 2024 12:49) (72 - 81)  BP: 162/82 (12 Sep 2024 12:49) (154/82 - 162/82)  BP(mean): --  RR: 18 (12 Sep 2024 12:49) (18 - 18)  SpO2: 98% (12 Sep 2024 12:49) (97% - 98%)    Parameters below as of 12 Sep 2024 12:49  Patient On (Oxygen Delivery Method): room air    PE  NAD  Awake, alert  Anicteric, MMM  RRR  CTAB  Abd soft, NT, ND  No c/c/e  No rash grossly                        8.4    6.73  )-----------( 323      ( 12 Sep 2024 07:34 )             29.0     09-11    139  |  106  |  12  ----------------------------<  80  3.7   |  23  |  0.76    Ca    8.5      11 Sep 2024 07:17        
Date of Service  : 09-11-24     INTERVAL HPI/OVERNIGHT EVENTS: I feel better . Mother in room.  Vital Signs Last 24 Hrs  T(C): 36.9 (11 Sep 2024 05:55), Max: 36.9 (11 Sep 2024 05:55)  T(F): 98.4 (11 Sep 2024 05:55), Max: 98.4 (11 Sep 2024 05:55)  HR: 77 (11 Sep 2024 05:55) (77 - 86)  BP: 155/76 (11 Sep 2024 05:55) (145/79 - 155/76)  BP(mean): --  RR: 18 (11 Sep 2024 05:55) (18 - 18)  SpO2: 97% (11 Sep 2024 05:55) (97% - 97%)    Parameters below as of 11 Sep 2024 05:55  Patient On (Oxygen Delivery Method): room air      I&O's Summary    11 Sep 2024 07:01  -  11 Sep 2024 18:39  --------------------------------------------------------  IN: 480 mL / OUT: 0 mL / NET: 480 mL      MEDICATIONS  (STANDING):  iron sucrose IVPB 200 milliGRAM(s) IV Intermittent every 24 hours    MEDICATIONS  (PRN):    LABS:                        7.7    6.58  )-----------( 333      ( 11 Sep 2024 07:18 )             26.6     09-11    139  |  106  |  12  ----------------------------<  80  3.7   |  23  |  0.76    Ca    8.5      11 Sep 2024 07:17        Urinalysis Basic - ( 11 Sep 2024 07:17 )    Color: x / Appearance: x / SG: x / pH: x  Gluc: 80 mg/dL / Ketone: x  / Bili: x / Urobili: x   Blood: x / Protein: x / Nitrite: x   Leuk Esterase: x / RBC: x / WBC x   Sq Epi: x / Non Sq Epi: x / Bacteria: x      CAPILLARY BLOOD GLUCOSE            Urinalysis Basic - ( 11 Sep 2024 07:17 )    Color: x / Appearance: x / SG: x / pH: x  Gluc: 80 mg/dL / Ketone: x  / Bili: x / Urobili: x   Blood: x / Protein: x / Nitrite: x   Leuk Esterase: x / RBC: x / WBC x   Sq Epi: x / Non Sq Epi: x / Bacteria: x      REVIEW OF SYSTEMS:  CONSTITUTIONAL: No fever, weight loss, or fatigue  EYES: No eye pain, visual disturbances, or discharge  ENMT:  No difficulty hearing, tinnitus, vertigo; No sinus or throat pain  NECK: No pain or stiffness  RESPIRATORY: No cough, wheezing, chills or hemoptysis; No shortness of breath  CARDIOVASCULAR: No chest pain, palpitations, dizziness, or leg swelling  GASTROINTESTINAL: No abdominal or epigastric pain. No nausea, vomiting, or hematemesis; No diarrhea or constipation. No melena or hematochezia.  GENITOURINARY: No dysuria, frequency, hematuria, or incontinence  NEUROLOGICAL: No headaches, memory loss, loss of strength, numbness, or tremors      Consultant(s) Notes Reviewed:  [x ] YES  [ ] NO    PHYSICAL EXAM:  GENERAL: NAD, well-groomed, well-developed,not in any distress ,  HEAD:  Atraumatic, Normocephalic  EYES: EOMI, PERRLA, conjunctiva and sclera clear  ENMT: No tonsillar erythema, exudates, or enlargement; Moist mucous membranes, Good dentition, No lesions  NECK: Supple, No JVD, Normal thyroid  NERVOUS SYSTEM:  Alert & Oriented X3, No focal deficit   CHEST/LUNG: Good air entry bilateral with no  rales, rhonchi, wheezing, or rubs  HEART: Regular rate and rhythm; No murmurs, rubs, or gallops  ABDOMEN: Soft, Nontender, Nondistended; Bowel sounds present  EXTREMITIES:  2+ Peripheral Pulses, No clubbing, cyanosis, or edema    Care Discussed with Consultants/Other Providers [ x] YES  [ ] NO
Date of Service  : 09-10-24     INTERVAL HPI/OVERNIGHT EVENTS: I feel better  and when can I go home.   Vital Signs Last 24 Hrs  T(C): 36.8 (10 Sep 2024 11:52), Max: 37.2 (10 Sep 2024 02:39)  T(F): 98.2 (10 Sep 2024 11:52), Max: 98.9 (10 Sep 2024 02:39)  HR: 86 (10 Sep 2024 18:47) (84 - 94)  BP: 152/81 (10 Sep 2024 18:47) (136/78 - 173/83)  BP(mean): --  RR: 18 (10 Sep 2024 11:52) (18 - 20)  SpO2: 100% (10 Sep 2024 11:52) (98% - 100%)    Parameters below as of 10 Sep 2024 11:52  Patient On (Oxygen Delivery Method): room air      I&O's Summary    MEDICATIONS  (STANDING):  iron sucrose IVPB 200 milliGRAM(s) IV Intermittent every 24 hours    MEDICATIONS  (PRN):    LABS:                        7.0    7.45  )-----------( 385      ( 10 Sep 2024 07:12 )             24.9     09-09    137  |  103  |  14  ----------------------------<  227<H>  4.4   |  22  |  0.72    Ca    8.3<L>      09 Sep 2024 09:58  Mg     2.2     09-09    TPro  7.2  /  Alb  3.7  /  TBili  0.6  /  DBili  x   /  AST  31  /  ALT  16  /  AlkPhos  62  09-09    PT/INR - ( 09 Sep 2024 09:58 )   PT: 15.1 sec;   INR: 1.39 ratio         PTT - ( 09 Sep 2024 09:58 )  PTT:30.2 sec  Urinalysis Basic - ( 09 Sep 2024 09:58 )    Color: x / Appearance: x / SG: x / pH: x  Gluc: 227 mg/dL / Ketone: x  / Bili: x / Urobili: x   Blood: x / Protein: x / Nitrite: x   Leuk Esterase: x / RBC: x / WBC x   Sq Epi: x / Non Sq Epi: x / Bacteria: x      CAPILLARY BLOOD GLUCOSE            Urinalysis Basic - ( 09 Sep 2024 09:58 )    Color: x / Appearance: x / SG: x / pH: x  Gluc: 227 mg/dL / Ketone: x  / Bili: x / Urobili: x   Blood: x / Protein: x / Nitrite: x   Leuk Esterase: x / RBC: x / WBC x   Sq Epi: x / Non Sq Epi: x / Bacteria: x      REVIEW OF SYSTEMS:  CONSTITUTIONAL: No fever, weight loss, or fatigue  EYES: No eye pain, visual disturbances, or discharge  ENMT:  No difficulty hearing, tinnitus, vertigo; No sinus or throat pain  NECK: No pain or stiffness  RESPIRATORY: No cough, wheezing, chills or hemoptysis; No shortness of breath  CARDIOVASCULAR: No chest pain, palpitations, dizziness, or leg swelling  GASTROINTESTINAL: No abdominal or epigastric pain. No nausea, vomiting, or hematemesis; No diarrhea or constipation. No melena or hematochezia.  GENITOURINARY: No dysuria, frequency, hematuria, or incontinence    Consultant(s) Notes Reviewed:  [x ] YES  [ ] NO    PHYSICAL EXAM:  GENERAL: NAD, well-groomed, well-developed,not in any distress ,  HEAD:  Atraumatic, Normocephalic  EYES: EOMI, PERRLA, conjunctiva and sclera clear  ENMT: No tonsillar erythema, exudates, or enlargement; Moist mucous membranes, Good dentition, No lesions  NECK: Supple, No JVD, Normal thyroid  NERVOUS SYSTEM:  Alert & Oriented X3, No focal deficit   CHEST/LUNG: Good air entry bilateral with no  rales, rhonchi, wheezing, or rubs  HEART: Regular rate and rhythm; No murmurs, rubs, or gallops  ABDOMEN: Soft, Nontender, Nondistended; Bowel sounds present    Care Discussed with Consultants/Other Providers [ x] YES  [ ] NO
Patient is a 44y old  Female who presents with a chief complaint of SOB (12 Sep 2024 13:53)    Patient seen and examined at bedside, feels well. Awaiting liver bx.     MEDICATIONS  (STANDING):  iron sucrose IVPB 200 milliGRAM(s) IV Intermittent every 24 hours    MEDICATIONS  (PRN):      ROS  No fever, sweats, chills  No epistaxis, HA, sore throat  No CP, SOB, cough, sputum  No n/v/d, abd pain, melena, hematochezia  No edema  No rash  No anxiety  No back pain, joint pain  No bleeding, bruising  No dysuria, hematuria    Vital Signs Last 24 Hrs  T(C): 36.9 (13 Sep 2024 11:59), Max: 36.9 (13 Sep 2024 05:21)  T(F): 98.4 (13 Sep 2024 11:59), Max: 98.4 (13 Sep 2024 05:21)  HR: 71 (13 Sep 2024 11:59) (68 - 80)  BP: 167/88 (13 Sep 2024 11:59) (157/80 - 168/88)  BP(mean): --  RR: 18 (13 Sep 2024 11:59) (17 - 18)  SpO2: 97% (13 Sep 2024 11:59) (97% - 98%)    Parameters below as of 13 Sep 2024 11:59  Patient On (Oxygen Delivery Method): room air    PE  NAD  Awake, alert  Anicteric, MMM  RRR  CTAB  Abd soft, NT, ND  No c/c/e  No rash grossly                          8.5    6.60  )-----------( 316      ( 13 Sep 2024 07:08 )             30.2       09-13    143  |  108  |  11  ----------------------------<  93  3.7   |  23  |  0.78    Ca    8.5      13 Sep 2024 07:08    TPro  7.0  /  Alb  3.5  /  TBili  0.6  /  DBili  x   /  AST  24  /  ALT  14  /  AlkPhos  57  09-13

## 2024-09-13 NOTE — DISCHARGE NOTE NURSING/CASE MANAGEMENT/SOCIAL WORK - NSDCFUADDAPPT_GEN_ALL_CORE_FT
Interventional Radiology:  You have a telehealth appointment scheduled on Tuesday, 9/17/24 at 1:00 PM. You will receive a link through your email. If you have any questions, please call .      You must follow up with your Hematologist/Oncologist, Dr. Hardy, within one week of discharge - please call to make an appointment.    You must follow up with a gynecologist within one week of discharge.  Since you do not have one, please call [facility] at [phone] to make an appointment.    You must follow up with your primary medical doctor within 2-3 days of discharge - please call to make an appointment.  If you don't have one, you can follow up at the [facility] - please call [phone] to make an appointment.                APPTS ARE READY TO BE MADE: [X] YES    Best Family or Patient Contact (if needed):    Additional Information about above appointments (if needed):    1: Hematology/Oncology  2: Gynecology  3: Primary medical doctor    Other comments or requests:

## 2024-09-13 NOTE — CHART NOTE - NSCHARTNOTEFT_GEN_A_CORE
Setting as a callback and continue, so I have time to coordinate the patients appointments. I will follow up with the patient once the appointments have been secured and sliding scale for this patient to apply

## 2024-09-16 LAB — CYTOLOGY SPEC DOC CYTO: SIGNIFICANT CHANGE UP

## 2024-09-17 ENCOUNTER — APPOINTMENT (OUTPATIENT)
Dept: INTERVENTIONAL RADIOLOGY/VASCULAR | Facility: CLINIC | Age: 44
End: 2024-09-17

## 2024-09-17 DIAGNOSIS — K76.9 LIVER DISEASE, UNSPECIFIED: ICD-10-CM

## 2024-09-17 DIAGNOSIS — Z78.9 OTHER SPECIFIED HEALTH STATUS: ICD-10-CM

## 2024-09-17 NOTE — DATA REVIEWED
[FreeTextEntry1] : ACC: [id number] EXAM: MR ABDOMEN WAW IC ORDERED BY: CINDY REYES  PROCEDURE DATE: 09/10/2024    INTERPRETATION: CLINICAL INFORMATION: Liver mass on CT. Anemia. Menorrhagia.  COMPARISON: CT chest, abdomen, and pelvis 9/9/2024.  CONTRAST/COMPLICATIONS: IV Contrast: Gadavist 10 cc administered 0 cc discarded Oral Contrast: NONE Complications: None reported at time of study completion  PROCEDURE: MRI of the abdomen was performed. MRCP was performed.  FINDINGS: LOWER CHEST: Small bilateral pleural effusions.  LIVER: Enlarged, the right lobe measuring 20.5 cm craniocaudally. Multiple masses within the left hepatic lobe. The largest dominant mass within segment 3 measures 5.4 x 3.8 cm, which demonstrates heterogeneous mild to moderate T2 signal. This dominant mass also demonstrates early irregular peripheral enhancement with progressive irregular filling by the delayed phase, as well as regions of restricted diffusion. Additional enhancing masses within the left lobe measure up to 2.6 x 2.6 cm (series 11 image 22), which also demonstrates restricted diffusion, and several demonstrate a targetoid appearance. There is a subcentimeter focus of restricted diffusion within the right hepatic lobe (series 23 image 88), without a clear correlate seen on additional sequences. BILE DUCTS: Normal caliber. GALLBLADDER: Contracted gallbladder with wall thickening. SPLEEN: Enlarged, measures 15.3 cm. PANCREAS: Within normal limits. ADRENALS: Within normal limits. KIDNEYS/URETERS: Within normal limits.  VISUALIZED PORTIONS: BOWEL: Within normal limits. REPRODUCTIVE ORGANS: Partially imaged uterus demonstrating thickening of the endometrium, imaged portions demonstrating a thickness of 2.7 cm. PERITONEUM: Enhancing nodular focus with restricted diffusion measuring 0.8 x 0.7 cm inseparable from the anterior margin of the left hepatic lobe (series 15 image 29), concerning for a pericapsular implant or direct extension of liver tumor. Mild peritoneal nodularity in the anterior midline upper abdomen (series 11 images 31-33). VESSELS: Within normal limits. RETROPERITONEUM/LYMPH NODES: Periportal, peripancreatic, and gastrohepatic lymphadenopathy. For reference, a periportal lymph node measures 4.7 x 2.4 cm (series 5 image 29). Small retroperitoneal lymph nodes, with left para-aortic example measuring 1.2 x 0.9 cm (series 17 image 63). Right cardiophrenic lymph node measuring 1.4 cm (series 17 image 13). ABDOMINAL WALL: Diffuse subcutaneous edema. BONES: Within normal limits.  IMPRESSION: * Partially imaged uterus with endometrial thickening. Recommend further evaluation with pelvic ultrasound and gynecological consultation. * Multiple liver lesions, concerning for malignancy. Dominant left hepatic lobe mass measuring 5.4 cm, for which differential includes a primary hepatobiliary lesion with consideration given to cholangiocarcinoma or metastasis. Additional lesions seen in the left hepatic lobe are suspicious for metastases. Nonspecific subcentimeter focus of restricted diffusion in the right hepatic lobe without definite correlate seen on additional sequences, possibly an additional metastasis. * Abdominal lymphadenopathy and prominent retroperitoneal lymph nodes. Enlarged right cardiophrenic lymph node. * Subcentimeter nodule inseparable from the anterior margin of the left hepatic lobe, concerning for a pericapsular implant versus exophytic extension of liver tumor. Mild peritoneal nodularity in the anterior midline upper abdomen. Peritoneal carcinomatosis is not excluded. * Hepatosplenomegaly. * Contracted gallbladder with nonspecific wall thickening. * Small bilateral pleural effusions.   --- End of Report ---

## 2024-09-17 NOTE — HISTORY OF PRESENT ILLNESS
[FreeTextEntry1] : Ms. Live is a 44 y.o female with no known past medical history, recently image identified liver lesions who presents to IR for consultation for targeted liver bx.   She was recently admitted to [facility] with increased dyspnea with exertion, bilateral upper extremity numbness/tingling, chest pain radiating to the back. Hemoglobin was noted to be 3.7 on admission.  Patient received four units of PRBCs. On further imaging workup she was noted to have endometrial thickening and, multiple liver lesions concerning for cholangiocarcinoma or metastasis. Referred by inpatient team for targeted liver bx.   Today she reports feeling well since her hospital discharge. She denies any further dyspnea. She denies any appetite changes or weight loss. Denies abdominal pain, discoloration of the skin/eyes, abdominal distention, LE edema, confusion, hematochezia, melena, or n/v/d.   Hemonc: Edgar Hardy Phone: [phone] Fax: [phone] Follow Up Time: 1 week----? has appt set 9/30      [0] : ~His/Her~ pain was 0 out of 10

## 2024-09-17 NOTE — ASSESSMENT
[Other: _____] : [unfilled] [FreeTextEntry1] : Ms. Live is a 44 y.o female with no known past medical history, recently image identified liver lesions who presents to IR for consultation for targeted liver bx.   1. Liver lesions - pt with no know PMH recently presented to [facility] for dyspnea, found to be anemic to 3.7 s/p 4 PRBCs - additionally found to have liver lesions on workup concerning for malignancy - referred by inpatient team for outpatient CT guided targeted liver bx biopsy, for definitive diagnosis to aid in formulating plan of care - 9/10 CT reviewed by Dr. Nguyen - procedural benefits, alternatives, risks (bleeding & infection) & expected postprocedural course were discussed at great length -  A cytology tech will be present to evaluate bx samples to determine if adequate tissue is present for diagnosis - biopsy results typically take 3-5 business days or longer if special testing is performed & results will be sent to your referring Dr who will review them with you - Labs from  9/13- cbc, cmp, inr - have been reviewed (Hg/Hct stable at 8.5/30.2) - the pt expressed that she is open to exploring treatment options should bx be positive for malignancy - will make arrangements - has follow up with hemonc Dr. Hardy on 9/30    I have provided the patient the opportunity to ask questions and have answered them to their satisfaction.  They are encouraged to contact our office with any further questions, concerns, or issues.  Above case and plan discussed with Dr. Nguyen.

## 2024-09-17 NOTE — PHYSICAL EXAM
[Well Nourished] : well nourished [Well Developed] : well developed [Healthy Appearance] : healthy appearance [Normal Affect] : the affect was normal [Alert] : alert [No Acute Distress] : no acute distress [Normal Voice/Communication] : normal voice communication [No Respiratory Distress] : no respiratory distress [Oriented x3] : oriented to person, place, and time [Normal Insight/Judgement] : insight and judgment were intact [Normal Mood] : the mood was normal [Recent Memory Normal] : recent memory was not impaired [Remote Memory Normal] : remote memory was not impaired [Fully active, able to carry on all pre-disease performance without restriction] : Fully active, able to carry on all pre-disease performance without restriction

## 2024-09-17 NOTE — DATA REVIEWED
[FreeTextEntry1] : ACC: [id number] EXAM: MR ABDOMEN WAW IC ORDERED BY: CINDY REYES  PROCEDURE DATE: 09/10/2024    INTERPRETATION: CLINICAL INFORMATION: Liver mass on CT. Anemia. Menorrhagia.  COMPARISON: CT chest, abdomen, and pelvis 9/9/2024.  CONTRAST/COMPLICATIONS: IV Contrast: Gadavist 10 cc administered 0 cc discarded Oral Contrast: NONE Complications: None reported at time of study completion  PROCEDURE: MRI of the abdomen was performed. MRCP was performed.  FINDINGS: LOWER CHEST: Small bilateral pleural effusions.  LIVER: Enlarged, the right lobe measuring 20.5 cm craniocaudally. Multiple masses within the left hepatic lobe. The largest dominant mass within segment 3 measures 5.4 x 3.8 cm, which demonstrates heterogeneous mild to moderate T2 signal. This dominant mass also demonstrates early irregular peripheral enhancement with progressive irregular filling by the delayed phase, as well as regions of restricted diffusion. Additional enhancing masses within the left lobe measure up to 2.6 x 2.6 cm (series 11 image 22), which also demonstrates restricted diffusion, and several demonstrate a targetoid appearance. There is a subcentimeter focus of restricted diffusion within the right hepatic lobe (series 23 image 88), without a clear correlate seen on additional sequences. BILE DUCTS: Normal caliber. GALLBLADDER: Contracted gallbladder with wall thickening. SPLEEN: Enlarged, measures 15.3 cm. PANCREAS: Within normal limits. ADRENALS: Within normal limits. KIDNEYS/URETERS: Within normal limits.  VISUALIZED PORTIONS: BOWEL: Within normal limits. REPRODUCTIVE ORGANS: Partially imaged uterus demonstrating thickening of the endometrium, imaged portions demonstrating a thickness of 2.7 cm. PERITONEUM: Enhancing nodular focus with restricted diffusion measuring 0.8 x 0.7 cm inseparable from the anterior margin of the left hepatic lobe (series 15 image 29), concerning for a pericapsular implant or direct extension of liver tumor. Mild peritoneal nodularity in the anterior midline upper abdomen (series 11 images 31-33). VESSELS: Within normal limits. RETROPERITONEUM/LYMPH NODES: Periportal, peripancreatic, and gastrohepatic lymphadenopathy. For reference, a periportal lymph node measures 4.7 x 2.4 cm (series 5 image 29). Small retroperitoneal lymph nodes, with left para-aortic example measuring 1.2 x 0.9 cm (series 17 image 63). Right cardiophrenic lymph node measuring 1.4 cm (series 17 image 13). ABDOMINAL WALL: Diffuse subcutaneous edema. BONES: Within normal limits.  IMPRESSION: * Partially imaged uterus with endometrial thickening. Recommend further evaluation with pelvic ultrasound and gynecological consultation. * Multiple liver lesions, concerning for malignancy. Dominant left hepatic lobe mass measuring 5.4 cm, for which differential includes a primary hepatobiliary lesion with consideration given to cholangiocarcinoma or metastasis. Additional lesions seen in the left hepatic lobe are suspicious for metastases. Nonspecific subcentimeter focus of restricted diffusion in the right hepatic lobe without definite correlate seen on additional sequences, possibly an additional metastasis. * Abdominal lymphadenopathy and prominent retroperitoneal lymph nodes. Enlarged right cardiophrenic lymph node. * Subcentimeter nodule inseparable from the anterior margin of the left hepatic lobe, concerning for a pericapsular implant versus exophytic extension of liver tumor. Mild peritoneal nodularity in the anterior midline upper abdomen. Peritoneal carcinomatosis is not excluded. * Hepatosplenomegaly. * Contracted gallbladder with nonspecific wall thickening. * Small bilateral pleural effusions.   --- End of Report ---

## 2024-09-17 NOTE — REASON FOR VISIT
[Consultation] : a consultation visit [Home] : at home, [unfilled] , at the time of the visit. [Medical Office: ([facility])___] : at the medical office located in  [Patient] : the patient [Self] : self [This encounter was initiated by telehealth (audio with video) and converted to telephone (audio only) due to technical difficulties.] : This encounter was initiated by telehealth (audio with video) and converted to telephone (audio only) due to technical difficulties. [FreeTextEntry1] : Targeted liver bx

## 2024-09-17 NOTE — REASON FOR VISIT
[Consultation] : a consultation visit [Home] : at home, [unfilled] , at the time of the visit. [Medical Office: ([facility])___] : at the medical office located in  [Patient] : the patient [Self] : self [This encounter was initiated by telehealth (audio with video) and converted to telephone (audio only) due to technical difficulties.] : This encounter was initiated by telehealth (audio with video) and converted to telephone (audio only) due to technical difficulties. [FreeTextEntry1] : Targeted liver bx

## 2024-09-17 NOTE — ASSESSMENT
[Other: _____] : [unfilled] [FreeTextEntry1] : Ms. Live is a 44 y.o female with no known past medical history, recently image identified liver lesions who presents to IR for consultation for targeted liver bx.   1. Liver lesions - pt with no know PMH recently presented to [facility] for dyspnea, found to be anemic to 3.7 s/p 4 PRBCs - additionally found to have liver lesions on workup concerning for malignancy - referred by inpatient team for outpatient CT guided targeted liver bx biopsy, for definitive diagnosis to aid in formulating plan of care - 9/10 CT reviewed by Dr. Nguyen - procedural benefits, alternatives, risks (bleeding & infection) & expected postprocedural course were discussed at great length -  A cytology tech will be present to evaluate bx samples to determine if adequate tissue is present for diagnosis - biopsy results typically take 3-5 business days or longer if special testing is performed & results will be sent to your referring Dr who will review them with you - Labs from  9/13- cbc, cmp, inr - have been reviewed (Hg/Hct stable at 8.5/30.2) - the pt expressed that she is open to exploring treatment options should bx be positive for malignancy - will make arrangements - has follow up with hemonc Dr. Hardy on 9/30    I have provided the patient the opportunity to ask questions and have answered them to their satisfaction.  They are encouraged to contact our office with any further questions, concerns, or issues.  Above case and plan discussed with Dr. Nguyen.

## 2024-09-17 NOTE — HISTORY OF PRESENT ILLNESS
[FreeTextEntry1] : Ms. Live is a 44 y.o female with no known past medical history, recently image identified liver lesions who presents to IR for consultation for targeted liver bx.   She was recently admitted to [facility] with increased dyspnea with exertion, bilateral upper extremity numbness/tingling, chest pain radiating to the back. Hemoglobin was noted to be 3.7 on admission.  Patient received four units of PRBCs. On further imaging workup she was noted to have endometrial thickening and, multiple liver lesions concerning for cholangiocarcinoma or metastasis. Referred by inpatient team for targeted liver bx.   Today she reports feeling well since her hospital discharge. She denies any further dyspnea. She denies any appetite changes or weight loss. Denies abdominal pain, discoloration of the skin/eyes, abdominal distention, LE edema, confusion, hematochezia, melena, or n/v/d.   Hemonc: Edgar Hardy Phone: [phone] Fax: [phone] Follow Up Time: 1 week----? has appt set 9/30      [0] : ~His/Her~ pain was 0 out of 10

## 2024-09-19 LAB — SURGICAL PATHOLOGY STUDY: SIGNIFICANT CHANGE UP

## 2024-09-24 ENCOUNTER — NON-APPOINTMENT (OUTPATIENT)
Age: 44
End: 2024-09-24

## 2024-09-26 ENCOUNTER — LABORATORY RESULT (OUTPATIENT)
Age: 44
End: 2024-09-26

## 2024-09-26 ENCOUNTER — OUTPATIENT (OUTPATIENT)
Dept: OUTPATIENT SERVICES | Facility: HOSPITAL | Age: 44
LOS: 1 days | End: 2024-09-26
Payer: MEDICAID

## 2024-09-26 ENCOUNTER — APPOINTMENT (OUTPATIENT)
Dept: OBGYN | Facility: CLINIC | Age: 44
End: 2024-09-26

## 2024-09-26 VITALS — SYSTOLIC BLOOD PRESSURE: 170 MMHG | DIASTOLIC BLOOD PRESSURE: 98 MMHG | BODY MASS INDEX: 43.75 KG/M2 | WEIGHT: 224 LBS

## 2024-09-26 DIAGNOSIS — R93.89 ABNORMAL FINDINGS ON DIAGNOSTIC IMAGING OF OTHER SPECIFIED BODY STRUCTURES: ICD-10-CM

## 2024-09-26 DIAGNOSIS — R87.625 UNSATISFACTORY CYTOLOGIC SMEAR OF VAGINA: ICD-10-CM

## 2024-09-26 DIAGNOSIS — N93.9 ABNORMAL UTERINE AND VAGINAL BLEEDING, UNSPECIFIED: ICD-10-CM

## 2024-09-26 DIAGNOSIS — D64.9 ANEMIA, UNSPECIFIED: ICD-10-CM

## 2024-09-26 DIAGNOSIS — Z01.419 ENCOUNTER FOR GYNECOLOGICAL EXAMINATION (GENERAL) (ROUTINE) W/OUT ABNORMAL FINDINGS: ICD-10-CM

## 2024-09-26 DIAGNOSIS — R92.30 DENSE BREASTS, UNSPECIFIED: ICD-10-CM

## 2024-09-26 DIAGNOSIS — N76.0 ACUTE VAGINITIS: ICD-10-CM

## 2024-09-26 PROBLEM — B97.7 HIGH RISK HPV INFECTION: Status: ACTIVE | Noted: 2024-09-26

## 2024-09-26 LAB
BILIRUB UR QL STRIP: NORMAL
CLARITY UR: CLEAR
COLLECTION METHOD: NORMAL
GLUCOSE UR-MCNC: NORMAL
HCG UR QL: 0.2 EU/DL
HGB UR QL STRIP.AUTO: NORMAL
KETONES UR-MCNC: NORMAL
LEUKOCYTE ESTERASE UR QL STRIP: NORMAL
NITRITE UR QL STRIP: NORMAL
PH UR STRIP: 5.5
PROT UR STRIP-MCNC: NORMAL
SP GR UR STRIP: 1.02

## 2024-09-26 PROCEDURE — G0444 DEPRESSION SCREEN ANNUAL: CPT | Mod: 59

## 2024-09-26 PROCEDURE — G0463: CPT

## 2024-09-26 PROCEDURE — 58100 BIOPSY OF UTERUS LINING: CPT

## 2024-09-26 PROCEDURE — 87624 HPV HI-RISK TYP POOLED RSLT: CPT

## 2024-09-26 PROCEDURE — 81003 URINALYSIS AUTO W/O SCOPE: CPT

## 2024-09-26 PROCEDURE — 99204 OFFICE O/P NEW MOD 45 MIN: CPT | Mod: 25

## 2024-09-26 NOTE — PHYSICAL EXAM
[Appropriately responsive] : appropriately responsive [Alert] : alert [No Acute Distress] : no acute distress [No Lymphadenopathy] : no lymphadenopathy [Soft] : soft [Non-tender] : non-tender [Non-distended] : non-distended [No HSM] : No HSM [No Lesions] : no lesions [No Mass] : no mass [Oriented x3] : oriented x3 [Examination Of The Breasts] : a normal appearance [No Masses] : no breast masses were palpable [Labia Majora] : normal [Labia Minora] : normal [Scant] : There was scant vaginal bleeding [Normal] : normal [Uterine Adnexae] : normal [FreeTextEntry6] : limited 2/2 body habitus

## 2024-09-26 NOTE — HISTORY OF PRESENT ILLNESS
[FreeTextEntry1] : 45yo G0  (LMP 8/20) new pt here as ED follow up. Pt presented to ED 9/11 with SOB/dizziness and found to have Hgb 3.7.  OT was admitted/ transfused 4U PRBC.  Work up - CT with multiple liver lesions and EM thickening.  Plevic sono with EM 36mm, increased vascularity.   GYN consulted while inpatient.  EMB and PAP attempted bedside.  Pt reports long h/o irreg menses- reports having a biopsy and unsure if normal.  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   Pt has outpatient appts scheduled for liver bx as well as internal medicine Pt admits that she has avoided dr appointments for several years   Gynhx: limited.   Denies abnl paps.   Last mammo -???    Pmhx: HTN/ anemia Shx: denies Meds: none ALL: NKDA Sochx: denies tobetoh/drugs   Denies dv or abuse/  Fam supportive Famhx  denies cancer hx PHQ9- 0 colonoscopy- never    9/12 PAP- neg, no ECC.  +HPV 9/12 EMB 1.  Endometrial tissue Final Diagnosis Endometrium, biopsy - Infarcted  and degenerated tissue with viable keratinized squamous differentiation, see note Note -Evaluation is limited due to marked degenerative changes. Multiple levels have been performed and examined.  Although,  viable endometrial tissue is present, possibility of more aggressive endometrial lesion with squamous differentiation cannot be excluded Patient's prior history of complex hyperplasia without atypia and current radiological history of thickened endometrium with multiple nodules/masses and liver is noted.  Current sample may not be representative of the entire lesion. Repeat sampling advised as clinically indicated.

## 2024-09-26 NOTE — DISCUSSION/SUMMARY
[FreeTextEntry1] : 43yo G0- new pt/ed follow up  # Thickened EM (36)/ h/o untreated complex hyperplasia - initial EMB with only infarcted tissue - [ ]rpt EMB today- abundant tissue - cont FESO4  # Liver lesions concerning for primary or metestatic pathology - [ ]pt has IR appt for bx - has medicine appt - pt aware of the seriousness of findings and is committed to following through with work up and treatment plan.  states family supportive and encouraging her to go to all visits  #  +HPV on hospital pap - pap without ECC [ ]PAP repeated today  (no pior on file)  HCM - [ ]mammo rx - pt to schedule w/ GI after evaluation for current medical issues - PHQ 9 reviewed face to face- no signs of depression at this time  f/u based on results Discussed possibility of next visit w/ gyn onc  Domenica Michael PAC

## 2024-09-26 NOTE — PROCEDURE
[Cervical Pap Smear] : cervical Pap smear [Liquid Base] : liquid base [Endometrial Biopsy] : Endometrial biopsy [Irregular Bleeding] : irregular uterine bleeding [Thickened Endometrium] : thickened endometrium [Risks] : risks [Benefits] : benefits [Alternatives] : alternatives [Infection] : infection [Bleeding] : bleeding [Allergic Reaction] : allergic reaction [Uterine Perforation] : uterine perforation [Negative] : negative pregnancy test [Betadine] : Betadine [None] : none [Tenaculum] : Tenaculum [Easy Passage] : Easy passage [Anteverted] : anteverted [Abundant] : abundant [Specimen Collected] : collected [Sent to Pathology] : placed in buffered formalin and sent for pathology [LMPDate] : 8/2024

## 2024-09-27 LAB — HPV HIGH+LOW RISK DNA PNL CVX: SIGNIFICANT CHANGE UP

## 2024-10-02 ENCOUNTER — NON-APPOINTMENT (OUTPATIENT)
Age: 44
End: 2024-10-02

## 2024-10-02 ENCOUNTER — RESULT REVIEW (OUTPATIENT)
Age: 44
End: 2024-10-02

## 2024-10-02 ENCOUNTER — OUTPATIENT (OUTPATIENT)
Dept: OUTPATIENT SERVICES | Facility: HOSPITAL | Age: 44
LOS: 1 days | End: 2024-10-02
Payer: MEDICAID

## 2024-10-02 ENCOUNTER — TRANSCRIPTION ENCOUNTER (OUTPATIENT)
Age: 44
End: 2024-10-02

## 2024-10-02 VITALS
DIASTOLIC BLOOD PRESSURE: 72 MMHG | SYSTOLIC BLOOD PRESSURE: 145 MMHG | HEART RATE: 76 BPM | RESPIRATION RATE: 18 BRPM | OXYGEN SATURATION: 97 %

## 2024-10-02 VITALS
TEMPERATURE: 98 F | SYSTOLIC BLOOD PRESSURE: 161 MMHG | RESPIRATION RATE: 18 BRPM | HEART RATE: 82 BPM | OXYGEN SATURATION: 98 % | DIASTOLIC BLOOD PRESSURE: 76 MMHG | HEIGHT: 61 IN | WEIGHT: 225.97 LBS

## 2024-10-02 DIAGNOSIS — K76.9 LIVER DISEASE, UNSPECIFIED: ICD-10-CM

## 2024-10-02 PROCEDURE — 76942 ECHO GUIDE FOR BIOPSY: CPT | Mod: 26

## 2024-10-02 PROCEDURE — 88173 CYTOPATH EVAL FNA REPORT: CPT | Mod: 26

## 2024-10-02 PROCEDURE — 86870 RBC ANTIBODY IDENTIFICATION: CPT

## 2024-10-02 PROCEDURE — 88307 TISSUE EXAM BY PATHOLOGIST: CPT | Mod: 26

## 2024-10-02 PROCEDURE — 88381 MICRODISSECTION MANUAL: CPT

## 2024-10-02 PROCEDURE — 81288 MLH1 GENE: CPT

## 2024-10-02 PROCEDURE — 88172 CYTP DX EVAL FNA 1ST EA SITE: CPT

## 2024-10-02 PROCEDURE — 86900 BLOOD TYPING SEROLOGIC ABO: CPT

## 2024-10-02 PROCEDURE — 88341 IMHCHEM/IMCYTCHM EA ADD ANTB: CPT

## 2024-10-02 PROCEDURE — 47000 NEEDLE BIOPSY OF LIVER PERQ: CPT

## 2024-10-02 PROCEDURE — 88360 TUMOR IMMUNOHISTOCHEM/MANUAL: CPT

## 2024-10-02 PROCEDURE — 88360 TUMOR IMMUNOHISTOCHEM/MANUAL: CPT | Mod: 26,59

## 2024-10-02 PROCEDURE — 88342 IMHCHEM/IMCYTCHM 1ST ANTB: CPT | Mod: 26,59

## 2024-10-02 PROCEDURE — 88341 IMHCHEM/IMCYTCHM EA ADD ANTB: CPT | Mod: 26,59

## 2024-10-02 PROCEDURE — 86077 PHYS BLOOD BANK SERV XMATCH: CPT

## 2024-10-02 PROCEDURE — 76942 ECHO GUIDE FOR BIOPSY: CPT

## 2024-10-02 PROCEDURE — 86901 BLOOD TYPING SEROLOGIC RH(D): CPT

## 2024-10-02 PROCEDURE — 88173 CYTOPATH EVAL FNA REPORT: CPT

## 2024-10-02 PROCEDURE — 86850 RBC ANTIBODY SCREEN: CPT

## 2024-10-02 PROCEDURE — 88307 TISSUE EXAM BY PATHOLOGIST: CPT

## 2024-10-02 PROCEDURE — 86922 COMPATIBILITY TEST ANTIGLOB: CPT

## 2024-10-02 PROCEDURE — 88381 MICRODISSECTION MANUAL: CPT | Mod: 26

## 2024-10-02 PROCEDURE — 88342 IMHCHEM/IMCYTCHM 1ST ANTB: CPT

## 2024-10-02 PROCEDURE — 86880 COOMBS TEST DIRECT: CPT

## 2024-10-02 NOTE — PACU DISCHARGE NOTE - THE ANESTHESIA ORDERS USED IN THE PACU ORDER SET WILL BE DISCONTINUED UPON TRANSFER OF THIS PATIENT
Pts brother called to report his brother is in a lot of pain and wanted to know if anything can be done to help him with it   Eduard Text on call provider Hudson Grayson Statement Selected

## 2024-10-02 NOTE — PACU DISCHARGE NOTE - NSPTMEETSDISCHCRITERIADT_GEN_A_CORE
02-Oct-2024 18:37 Calculated 53 Not Established mg/dL   Comprehensive Metabolic Panel   Result Value Ref Range    Sodium 139 136 - 145 mmol/L    Potassium 5.2 (H) 3.5 - 5.1 mmol/L    Chloride 103 99 - 110 mmol/L    CO2 26 21 - 32 mmol/L    Anion Gap 10 3 - 16    Glucose 105 (H) 70 - 99 mg/dL    BUN 15 7 - 20 mg/dL    CREATININE 0.8 0.6 - 1.2 mg/dL    GFR Non-African American >60 >60    GFR African American >60 >60    Calcium 9.0 8.3 - 10.6 mg/dL    Total Protein 6.7 6.4 - 8.2 g/dL    Albumin 4.4 3.4 - 5.0 g/dL    Albumin/Globulin Ratio 1.9 1.1 - 2.2    Total Bilirubin 0.3 0.0 - 1.0 mg/dL    Alkaline Phosphatase 85 40 - 129 U/L    ALT 22 10 - 40 U/L    AST 22 15 - 37 U/L   CBC   Result Value Ref Range    WBC 7.2 4.0 - 11.0 K/uL    RBC 4.72 4.00 - 5.20 M/uL    Hemoglobin 15.3 12.0 - 16.0 g/dL    Hematocrit 46.0 36.0 - 48.0 %    MCV 97.5 80.0 - 100.0 fL    MCH 32.5 26.0 - 34.0 pg    MCHC 33.3 31.0 - 36.0 g/dL    RDW 12.5 12.4 - 15.4 %    Platelets 736 355 - 729 K/uL    MPV 9.5 5.0 - 10.5 fL   TSH with Reflex   Result Value Ref Range    TSH 7.36 (H) 0.27 - 4.20 uIU/mL   T4, Free   Result Value Ref Range    T4 Free 1.1 0.9 - 1.8 ng/dL              ROS:  Review of Systems   Constitutional: negative   HENT: negative   EYES: negative   Respiratory: negative   Gastrointestinal: negative   Endocrine: negative   Musculoskeletal: negative   Skin: negative   Allergic/Immunological: negative   Hematological: negative   Psychiatric/Behavorial: negative   CV: negative   CNS: negative   :Negative   S/E:Negative  Renal: Negative     Physical Exam:  Head/neck: Ears: Normal TM. No obstruction. Throat: Not examined. Mask. Thyroid is nonpalpable. Neck: No lymphadenopathy. Eyes: EOMI, PERRLA with no nystagmus  Lungs: Clear to auscultation. CV: S1-S2 normal.  No murmur. Carotid: No bruit. Abdominal Examination: Bowel sounds present. Soft nontender. No mass no guarding or   rebound. Spine/extremities: No edema. No tenderness to palpation. Skin: No rash  CNS: Patient is alert, cooperative, moves all 4 limbs, ambulates without difficulty, light touch normal.  Good historian. Good orientation. Blood pressure 128/76, pulse 74, weight 102 lb (46.3 kg), SpO2 99 %.        Mara Russo MD

## 2024-10-02 NOTE — ASU DISCHARGE PLAN (ADULT/PEDIATRIC) - NS MD DC FALL RISK RISK
For information on Fall & Injury Prevention, visit: [email or web address] OR  [email or web address] OR  https://www.cdc.gov/steadi/patient.html
Left arm;

## 2024-10-02 NOTE — ASU DISCHARGE PLAN (ADULT/PEDIATRIC) - NURSING INSTRUCTIONS
Please feel free to contact us at [phone] if any problems arise. After 6PM, Monday through Friday, on weekends and on holidays, please call [phone] and ask for the radiology resident on call to be paged.

## 2024-10-03 ENCOUNTER — NON-APPOINTMENT (OUTPATIENT)
Age: 44
End: 2024-10-03

## 2024-10-06 PROBLEM — C54.1 ENDOMETRIAL CANCER: Status: ACTIVE | Noted: 2024-10-06

## 2024-10-06 PROBLEM — R16.0 LIVER MASS: Status: ACTIVE | Noted: 2024-10-06

## 2024-10-08 ENCOUNTER — TRANSCRIPTION ENCOUNTER (OUTPATIENT)
Age: 44
End: 2024-10-08

## 2024-10-08 ENCOUNTER — LABORATORY RESULT (OUTPATIENT)
Age: 44
End: 2024-10-08

## 2024-10-08 ENCOUNTER — APPOINTMENT (OUTPATIENT)
Dept: GYNECOLOGIC ONCOLOGY | Facility: CLINIC | Age: 44
End: 2024-10-08

## 2024-10-08 VITALS
HEART RATE: 87 BPM | RESPIRATION RATE: 17 BRPM | SYSTOLIC BLOOD PRESSURE: 184 MMHG | OXYGEN SATURATION: 95 % | WEIGHT: 220 LBS | TEMPERATURE: 98.1 F | DIASTOLIC BLOOD PRESSURE: 105 MMHG | BODY MASS INDEX: 43.19 KG/M2 | HEIGHT: 60 IN

## 2024-10-08 DIAGNOSIS — B97.7 PAPILLOMAVIRUS AS THE CAUSE OF DISEASES CLASSIFIED ELSEWHERE: ICD-10-CM

## 2024-10-08 DIAGNOSIS — R93.89 ABNORMAL FINDINGS ON DIAGNOSTIC IMAGING OF OTHER SPECIFIED BODY STRUCTURES: ICD-10-CM

## 2024-10-08 DIAGNOSIS — R92.30 DENSE BREASTS, UNSPECIFIED: ICD-10-CM

## 2024-10-08 DIAGNOSIS — Z78.9 OTHER SPECIFIED HEALTH STATUS: ICD-10-CM

## 2024-10-08 DIAGNOSIS — R16.0 HEPATOMEGALY, NOT ELSEWHERE CLASSIFIED: ICD-10-CM

## 2024-10-08 DIAGNOSIS — N93.9 ABNORMAL UTERINE AND VAGINAL BLEEDING, UNSPECIFIED: ICD-10-CM

## 2024-10-08 DIAGNOSIS — D64.9 ANEMIA, UNSPECIFIED: ICD-10-CM

## 2024-10-08 PROCEDURE — 99459 PELVIC EXAMINATION: CPT

## 2024-10-08 PROCEDURE — 99205 OFFICE O/P NEW HI 60 MIN: CPT

## 2024-10-09 LAB
ALBUMIN SERPL ELPH-MCNC: 4.2 G/DL
ALP BLD-CCNC: 94 U/L
ALT SERPL-CCNC: 17 U/L
ANION GAP SERPL CALC-SCNC: 14 MMOL/L
APPEARANCE: CLEAR
AST SERPL-CCNC: 34 U/L
BASOPHILS # BLD AUTO: 0.06 K/UL
BASOPHILS NFR BLD AUTO: 1 %
BILIRUB SERPL-MCNC: 0.4 MG/DL
BILIRUBIN URINE: NEGATIVE
BLOOD URINE: ABNORMAL
BUN SERPL-MCNC: 16 MG/DL
CALCIUM SERPL-MCNC: 9 MG/DL
CANCER AG125 SERPL-ACNC: 22 U/ML
CHLORIDE SERPL-SCNC: 101 MMOL/L
CO2 SERPL-SCNC: 25 MMOL/L
COLOR: NORMAL
CREAT SERPL-MCNC: 0.73 MG/DL
CYTOLOGY SPEC DOC CYTO: SIGNIFICANT CHANGE UP
EGFR: 103 ML/MIN/1.73M2
EOSINOPHIL # BLD AUTO: 0.13 K/UL
EOSINOPHIL NFR BLD AUTO: 2.2 %
ESTIMATED AVERAGE GLUCOSE: 100 MG/DL
GLUCOSE QUALITATIVE U: NEGATIVE MG/DL
GLUCOSE SERPL-MCNC: 130 MG/DL
HAV IGM SER QL: NONREACTIVE
HBA1C MFR BLD HPLC: 5.1 %
HBV CORE IGM SER QL: NONREACTIVE
HBV CORE IGM SER QL: NONREACTIVE
HBV SURFACE AB SER QL: NONREACTIVE
HBV SURFACE AG SER QL: NONREACTIVE
HBV SURFACE AG SER QL: NONREACTIVE
HCT VFR BLD CALC: 40.7 %
HCV AB SER QL: NONREACTIVE
HCV RNA FLD QL NAA+PROBE: NORMAL
HCV RNA SPEC QL PROBE+SIG AMP: NOT DETECTED
HCV S/CO RATIO: 0.13 S/CO
HEPATITIS A IGG ANTIBODY: REACTIVE
HEPB DNA PCR INT: NOT DETECTED
HEPB DNA PCR LOG: NOT DETECTED LOGIU/ML
HGB BLD-MCNC: 12.8 G/DL
IMM GRANULOCYTES NFR BLD AUTO: 0.3 %
KETONES URINE: NEGATIVE MG/DL
LEUKOCYTE ESTERASE URINE: ABNORMAL
LYMPHOCYTES # BLD AUTO: 1.55 K/UL
LYMPHOCYTES NFR BLD AUTO: 25.7 %
MAGNESIUM SERPL-MCNC: 2.1 MG/DL
MAN DIFF?: NORMAL
MCHC RBC-ENTMCNC: 27.2 PG
MCHC RBC-ENTMCNC: 31.4 GM/DL
MCV RBC AUTO: 86.6 FL
MONOCYTES # BLD AUTO: 0.65 K/UL
MONOCYTES NFR BLD AUTO: 10.8 %
NEUTROPHILS # BLD AUTO: 3.63 K/UL
NEUTROPHILS NFR BLD AUTO: 60 %
NITRITE URINE: NEGATIVE
PH URINE: 5.5
PHOSPHATE SERPL-MCNC: 3.6 MG/DL
PLATELET # BLD AUTO: 272 K/UL
POTASSIUM SERPL-SCNC: 4.6 MMOL/L
PROT SERPL-MCNC: 7.9 G/DL
PROTEIN URINE: 100 MG/DL
RBC # BLD: 4.7 M/UL
RBC # FLD: 24.4 %
SODIUM SERPL-SCNC: 140 MMOL/L
SPECIFIC GRAVITY URINE: 1.02
T3FREE SERPL-MCNC: 2.61 PG/ML
T4 FREE SERPL-MCNC: 1 NG/DL
TSH SERPL-ACNC: 6.57 UIU/ML
UROBILINOGEN URINE: 1 MG/DL
WBC # FLD AUTO: 6.04 K/UL

## 2024-10-11 ENCOUNTER — APPOINTMENT (OUTPATIENT)
Dept: GYNECOLOGIC ONCOLOGY | Facility: CLINIC | Age: 44
End: 2024-10-11
Payer: MEDICAID

## 2024-10-11 DIAGNOSIS — C54.1 MALIGNANT NEOPLASM OF ENDOMETRIUM: ICD-10-CM

## 2024-10-11 PROCEDURE — 99213 OFFICE O/P EST LOW 20 MIN: CPT | Mod: 95

## 2024-10-12 DIAGNOSIS — K76.9 LIVER DISEASE, UNSPECIFIED: ICD-10-CM

## 2024-10-14 ENCOUNTER — APPOINTMENT (OUTPATIENT)
Dept: GYNECOLOGIC ONCOLOGY | Facility: HOSPITAL | Age: 44
End: 2024-10-14

## 2024-10-15 ENCOUNTER — NON-APPOINTMENT (OUTPATIENT)
Age: 44
End: 2024-10-15

## 2024-10-15 RX ORDER — NEOMYCIN SULFATE 500 MG/1
500 TABLET ORAL
Qty: 2 | Refills: 0 | Status: ACTIVE | COMMUNITY
Start: 1900-01-01 | End: 1900-01-01

## 2024-10-15 RX ORDER — METRONIDAZOLE 500 MG/1
500 TABLET ORAL
Qty: 2 | Refills: 0 | Status: ACTIVE | COMMUNITY
Start: 1900-01-01 | End: 1900-01-01

## 2024-10-16 ENCOUNTER — INPATIENT (INPATIENT)
Facility: HOSPITAL | Age: 44
LOS: 0 days | Discharge: HOME | End: 2024-10-17
Attending: OBSTETRICS & GYNECOLOGY | Admitting: OBSTETRICS & GYNECOLOGY
Payer: MEDICAID

## 2024-10-16 ENCOUNTER — APPOINTMENT (OUTPATIENT)
Dept: GYNECOLOGIC ONCOLOGY | Facility: HOSPITAL | Age: 44
End: 2024-10-16

## 2024-10-16 ENCOUNTER — RESULT REVIEW (OUTPATIENT)
Age: 44
End: 2024-10-16

## 2024-10-16 ENCOUNTER — TRANSCRIPTION ENCOUNTER (OUTPATIENT)
Age: 44
End: 2024-10-16

## 2024-10-16 VITALS
HEIGHT: 61 IN | TEMPERATURE: 98 F | SYSTOLIC BLOOD PRESSURE: 157 MMHG | WEIGHT: 225.97 LBS | DIASTOLIC BLOOD PRESSURE: 76 MMHG | HEART RATE: 87 BPM | OXYGEN SATURATION: 100 % | RESPIRATION RATE: 17 BRPM

## 2024-10-16 DIAGNOSIS — C54.1 MALIGNANT NEOPLASM OF ENDOMETRIUM: ICD-10-CM

## 2024-10-16 LAB
GLUCOSE BLDC GLUCOMTR-MCNC: 116 MG/DL — HIGH (ref 70–99)
GLUCOSE BLDC GLUCOMTR-MCNC: 160 MG/DL — HIGH (ref 70–99)
GLUCOSE BLDC GLUCOMTR-MCNC: 168 MG/DL — HIGH (ref 70–99)
GLUCOSE BLDC GLUCOMTR-MCNC: 171 MG/DL — HIGH (ref 70–99)
HCG UR QL: NEGATIVE — SIGNIFICANT CHANGE UP

## 2024-10-16 PROCEDURE — 38999 UNLISTD PX HEMIC/LYMPHTC SYS: CPT | Mod: AS

## 2024-10-16 PROCEDURE — 88112 CYTOPATH CELL ENHANCE TECH: CPT | Mod: 26

## 2024-10-16 PROCEDURE — 88307 TISSUE EXAM BY PATHOLOGIST: CPT | Mod: 26

## 2024-10-16 PROCEDURE — 88342 IMHCHEM/IMCYTCHM 1ST ANTB: CPT | Mod: 26

## 2024-10-16 PROCEDURE — 88309 TISSUE EXAM BY PATHOLOGIST: CPT | Mod: 26

## 2024-10-16 PROCEDURE — 58573 TLH W/T/O UTERUS OVER 250 G: CPT | Mod: 22

## 2024-10-16 PROCEDURE — 57285 REPAIR PARAVAG DEFECT VAG: CPT

## 2024-10-16 PROCEDURE — 57285 REPAIR PARAVAG DEFECT VAG: CPT | Mod: AS

## 2024-10-16 PROCEDURE — 88305 TISSUE EXAM BY PATHOLOGIST: CPT | Mod: 26

## 2024-10-16 PROCEDURE — 58573 TLH W/T/O UTERUS OVER 250 G: CPT | Mod: AS,22

## 2024-10-16 PROCEDURE — 38770 REMOVE PELVIS LYMPH NODES: CPT | Mod: AS,50

## 2024-10-16 PROCEDURE — 38999A: CUSTOM

## 2024-10-16 PROCEDURE — 38770 REMOVE PELVIS LYMPH NODES: CPT | Mod: 50

## 2024-10-16 PROCEDURE — S2900 ROBOTIC SURGICAL SYSTEM: CPT | Mod: NC

## 2024-10-16 DEVICE — VISTASEAL FIBRIN HUMAN 10ML: Type: IMPLANTABLE DEVICE | Status: FUNCTIONAL

## 2024-10-16 RX ORDER — ALCOHOL ANTISEPTIC PADS
25 PADS, MEDICATED (EA) TOPICAL ONCE
Refills: 0 | Status: DISCONTINUED | OUTPATIENT
Start: 2024-10-16 | End: 2024-10-17

## 2024-10-16 RX ORDER — HYDRALAZINE HYDROCHLORIDE 100 MG/1
10 TABLET ORAL ONCE
Refills: 0 | Status: COMPLETED | OUTPATIENT
Start: 2024-10-16 | End: 2024-10-16

## 2024-10-16 RX ORDER — SODIUM CHLORIDE 0.9 % (FLUSH) 0.9 %
3 SYRINGE (ML) INJECTION EVERY 8 HOURS
Refills: 0 | Status: DISCONTINUED | OUTPATIENT
Start: 2024-10-16 | End: 2024-10-17

## 2024-10-16 RX ORDER — GLUCAGON INJECTION, SOLUTION 0.5 MG/.1ML
1 INJECTION, SOLUTION SUBCUTANEOUS ONCE
Refills: 0 | Status: DISCONTINUED | OUTPATIENT
Start: 2024-10-16 | End: 2024-10-17

## 2024-10-16 RX ORDER — HYDROMORPHONE HYDROCHLORIDE 1 MG/ML
0.5 INJECTION, SOLUTION INTRAMUSCULAR; INTRAVENOUS; SUBCUTANEOUS
Refills: 0 | Status: DISCONTINUED | OUTPATIENT
Start: 2024-10-16 | End: 2024-10-16

## 2024-10-16 RX ORDER — SODIUM CHLORIDE IRRIG SOLUTION 0.9 %
1000 SOLUTION, IRRIGATION IRRIGATION
Refills: 0 | Status: DISCONTINUED | OUTPATIENT
Start: 2024-10-16 | End: 2024-10-17

## 2024-10-16 RX ORDER — INSULIN LISPRO 100/ML
VIAL (ML) SUBCUTANEOUS AT BEDTIME
Refills: 0 | Status: DISCONTINUED | OUTPATIENT
Start: 2024-10-16 | End: 2024-10-17

## 2024-10-16 RX ORDER — SODIUM CHLORIDE IRRIG SOLUTION 0.9 %
1000 SOLUTION, IRRIGATION IRRIGATION
Refills: 0 | Status: DISCONTINUED | OUTPATIENT
Start: 2024-10-16 | End: 2024-10-16

## 2024-10-16 RX ORDER — ALCOHOL ANTISEPTIC PADS
12.5 PADS, MEDICATED (EA) TOPICAL ONCE
Refills: 0 | Status: DISCONTINUED | OUTPATIENT
Start: 2024-10-16 | End: 2024-10-17

## 2024-10-16 RX ORDER — METOPROLOL TARTRATE 50 MG
5 TABLET ORAL EVERY 6 HOURS
Refills: 0 | Status: DISCONTINUED | OUTPATIENT
Start: 2024-10-16 | End: 2024-10-17

## 2024-10-16 RX ORDER — KETOROLAC TROMETHAMINE 10 MG/1
15 TABLET, FILM COATED ORAL EVERY 6 HOURS
Refills: 0 | Status: DISCONTINUED | OUTPATIENT
Start: 2024-10-16 | End: 2024-10-17

## 2024-10-16 RX ORDER — OXYCODONE HYDROCHLORIDE 30 MG/1
5 TABLET, FILM COATED, EXTENDED RELEASE ORAL EVERY 4 HOURS
Refills: 0 | Status: DISCONTINUED | OUTPATIENT
Start: 2024-10-16 | End: 2024-10-17

## 2024-10-16 RX ORDER — SENNOSIDES 8.6 MG
2 TABLET ORAL AT BEDTIME
Refills: 0 | Status: DISCONTINUED | OUTPATIENT
Start: 2024-10-16 | End: 2024-10-17

## 2024-10-16 RX ORDER — ONDANSETRON HCL/PF 4 MG/2 ML
4 VIAL (ML) INJECTION ONCE
Refills: 0 | Status: DISCONTINUED | OUTPATIENT
Start: 2024-10-16 | End: 2024-10-16

## 2024-10-16 RX ORDER — INSULIN LISPRO 100/ML
VIAL (ML) SUBCUTANEOUS
Refills: 0 | Status: DISCONTINUED | OUTPATIENT
Start: 2024-10-16 | End: 2024-10-17

## 2024-10-16 RX ORDER — OXYCODONE HYDROCHLORIDE 30 MG/1
5 TABLET, FILM COATED, EXTENDED RELEASE ORAL ONCE
Refills: 0 | Status: DISCONTINUED | OUTPATIENT
Start: 2024-10-16 | End: 2024-10-16

## 2024-10-16 RX ORDER — ALCOHOL ANTISEPTIC PADS
15 PADS, MEDICATED (EA) TOPICAL ONCE
Refills: 0 | Status: DISCONTINUED | OUTPATIENT
Start: 2024-10-16 | End: 2024-10-17

## 2024-10-16 RX ADMIN — Medication 3 MILLILITER(S): at 21:35

## 2024-10-16 RX ADMIN — HYDRALAZINE HYDROCHLORIDE 10 MILLIGRAM(S): 100 TABLET ORAL at 17:17

## 2024-10-16 RX ADMIN — Medication 125 MILLILITER(S): at 17:19

## 2024-10-16 RX ADMIN — HYDROMORPHONE HYDROCHLORIDE 0.5 MILLIGRAM(S): 1 INJECTION, SOLUTION INTRAMUSCULAR; INTRAVENOUS; SUBCUTANEOUS at 18:39

## 2024-10-16 RX ADMIN — Medication 1: at 17:17

## 2024-10-16 RX ADMIN — KETOROLAC TROMETHAMINE 15 MILLIGRAM(S): 10 TABLET, FILM COATED ORAL at 20:00

## 2024-10-16 RX ADMIN — Medication 5 MILLIGRAM(S): at 19:49

## 2024-10-16 RX ADMIN — HYDRALAZINE HYDROCHLORIDE 10 MILLIGRAM(S): 100 TABLET ORAL at 21:28

## 2024-10-16 RX ADMIN — Medication 125 MILLILITER(S): at 21:28

## 2024-10-16 RX ADMIN — HYDROMORPHONE HYDROCHLORIDE 0.5 MILLIGRAM(S): 1 INJECTION, SOLUTION INTRAMUSCULAR; INTRAVENOUS; SUBCUTANEOUS at 17:50

## 2024-10-16 RX ADMIN — KETOROLAC TROMETHAMINE 15 MILLIGRAM(S): 10 TABLET, FILM COATED ORAL at 19:30

## 2024-10-16 NOTE — PATIENT PROFILE ADULT - NSPRESCRALCSCORE_GEN_A_NUR_CAL
I NEED TO SPEAK TO DR BRITTNEY MCMAHAN--I AM EXPERIENCING SHSARP PAINS IN THE UPPER LEFT AREA OF MY STOMACH AND THEY ARE WORSENING--I SO NOT WANT TO GO TO ER--PLEASE CALL ME   1

## 2024-10-16 NOTE — CHART NOTE - NSCHARTNOTEFT_GEN_A_CORE
Gynecological Oncology PA Post Op Note    Patient seen and examined at bedside, resting comfortable with good pain control. Denies headache, dizziness,  nausea, vomiting, chest pain, palpitations and shortness of breath. BP presently 187/83->Hydralazine 10mg IVP given. Zabala in situ(clear, yellow in bag). Patient has yet to have anything to eat or drink.       Vital Signs Last 24 Hours  T(C): 36.7 (10-16-24 @ 17:00), Max: 36.7 (10-16-24 @ 11:03)  HR: 67 (10-16-24 @ 17:30) (65 - 87)  BP: 156/73 (10-16-24 @ 17:30) (156/73 - 195/82)  RR: 15 (10-16-24 @ 17:30) (12 - 18)  SpO2: 99% (10-16-24 @ 17:30) (99% - 100%)    I&O's Summary      Physical Exam:  General: NAD  Pulmonary: bilaterally clear to ascultation   CV: Regular rate and rhythm  Abdomen: soft, non-distended, appropriately tender; scope sites C/D/I  : no vaginal bleeding noted on pad   Extremeties: no lower extremity edema or calve tenderness. Bilateral venodynes in place.    Labs:             12.2   4.89  )-----------( 288      ( 10-14 @ 14:43 )             37.3         MEDICATIONS  (STANDING):  dextrose 5%. 1000 milliLiter(s) (50 mL/Hr) IV Continuous <Continuous>  dextrose 5%. 1000 milliLiter(s) (100 mL/Hr) IV Continuous <Continuous>  dextrose 50% Injectable 25 Gram(s) IV Push once  dextrose 50% Injectable 12.5 Gram(s) IV Push once  dextrose 50% Injectable 25 Gram(s) IV Push once  glucagon  Injectable 1 milliGRAM(s) IntraMuscular once  insulin lispro (ADMELOG) corrective regimen sliding scale   SubCutaneous three times a day before meals  insulin lispro (ADMELOG) corrective regimen sliding scale   SubCutaneous at bedtime  ketorolac   Injectable 15 milliGRAM(s) IV Push every 6 hours  lactated ringers. 1000 milliLiter(s) (125 mL/Hr) IV Continuous <Continuous>  sodium chloride 0.9% lock flush 3 milliLiter(s) IV Push every 8 hours    MEDICATIONS  (PRN):  dextrose Oral Gel 15 Gram(s) Oral once PRN Blood Glucose LESS THAN 70 milliGRAM(s)/deciliter  HYDROmorphone  Injectable 0.5 milliGRAM(s) IV Push every 10 minutes PRN Severe Pain (7 - 10)  metoprolol tartrate Injectable 5 milliGRAM(s) IV Push every 6 hours PRN SBP >/= 160, DBP >/= 110  ondansetron Injectable 4 milliGRAM(s) IV Push once PRN Nausea and/or Vomiting  oxyCODONE    IR 5 milliGRAM(s) Oral once PRN Moderate Pain (4 - 6)  oxyCODONE    IR 5 milliGRAM(s) Oral every 4 hours PRN Severe Pain (7 - 10)  senna 2 Tablet(s) Oral at bedtime PRN Constipation  simethicone 80 milliGRAM(s) Chew every 4 hours PRN Gas      Assessment and Plan  43 yo female s/p  Robotic assisted Total Laparoscopic Hysterectomy, Bilateral salpingo-oophorectomy, sentinel pelvic lymph node mapping and dissection, cystoscopy, Bilateral ureterolysis, repair of vaginal laceration for FIGO 1 Endometriod Endometrial CA. recovering well in acute post-operative state. EBL: 100. See operative note for details.   -LR@125 mL  -CC Reg diet  -Zabala catheter overnight  -Toradol, Oxy 5 prn    -ISS and glucose monitoring ( T2DM)  -Lopressor prn, continue to monitor VS  -Bilateral venodynes when resting in bed  -Encourage ambulation and incentive spirometer use  -  -Mylicon/Senna  -F/U AM CBC/BMP  -Disposition: admit to 4T for routine post operative care.       Nhung Lee PA-C  #[id number]

## 2024-10-16 NOTE — ASU PREOP CHECKLIST - ALLERGY BAND ON
Addended by: BASSAM FERNÁNDEZ on: 10/28/2022 08:38 AM     Modules accepted: Orders     no known allergies

## 2024-10-16 NOTE — PACU DISCHARGE NOTE - THE ANESTHESIA ORDERS USED IN THE PACU ORDER SET WILL BE DISCONTINUED UPON TRANSFER OF THIS PATIENT
Jen  ID [id number]  [facility] [id number]
Office has been notified that pt is requiring Prior Authorization for the following medication:  ciprofloxacin-hydrocortisone (CIPRO HC) 0.2-1 % otic suspension       Please initiate this request through CoverMyMeds, contacting the following Payor/Insurance:  [employer]    Please see below, or the documentation attached to this encounter for any additional information that may assist in processing PA:  --     Thank you!
PA requested
Spoke with pt and let her know that Radha can't find her in the system. She stated they wouldn't cover the first medication Dr Bernarda Hooper sent but he prescribed a different one and she has picked it up.
Submitted PA for Cipro HC  Via [facility] [place]: [id number] STATUS: \"WE CANNOT LOCATE THIS PATIENT IN OUR SYSTEM. \"    I need the patient to be active for me to do the PA. If this requires a response please respond to the pool. [address]). Please advise patient thank you.
Tried to submit PA for Cipro HC 0.2-1% suspension. Radha Munson find matching patient with Name and Date of Birth provided. \"    Please verify with the patient pharmacy benefit with demographic information. If this requires a response please respond to the pool. [address]). Thank you.
Statement Selected

## 2024-10-16 NOTE — PATIENT PROFILE ADULT - FALL HARM RISK - HARM RISK INTERVENTIONS

## 2024-10-17 ENCOUNTER — TRANSCRIPTION ENCOUNTER (OUTPATIENT)
Age: 44
End: 2024-10-17

## 2024-10-17 ENCOUNTER — NON-APPOINTMENT (OUTPATIENT)
Age: 44
End: 2024-10-17

## 2024-10-17 VITALS
OXYGEN SATURATION: 98 % | RESPIRATION RATE: 18 BRPM | HEART RATE: 78 BPM | SYSTOLIC BLOOD PRESSURE: 166 MMHG | TEMPERATURE: 98 F | DIASTOLIC BLOOD PRESSURE: 67 MMHG

## 2024-10-17 LAB
A1C WITH ESTIMATED AVERAGE GLUCOSE RESULT: 5.1 % — SIGNIFICANT CHANGE UP (ref 4–5.6)
ALBUMIN SERPL ELPH-MCNC: 3.5 G/DL — SIGNIFICANT CHANGE UP (ref 3.3–5)
ALP SERPL-CCNC: 64 U/L — SIGNIFICANT CHANGE UP (ref 40–120)
ALT FLD-CCNC: 17 U/L — SIGNIFICANT CHANGE UP (ref 4–33)
ANION GAP SERPL CALC-SCNC: 10 MMOL/L — SIGNIFICANT CHANGE UP (ref 7–14)
AST SERPL-CCNC: 29 U/L — SIGNIFICANT CHANGE UP (ref 4–32)
BASOPHILS # BLD AUTO: 0.01 K/UL — SIGNIFICANT CHANGE UP (ref 0–0.2)
BASOPHILS NFR BLD AUTO: 0.1 % — SIGNIFICANT CHANGE UP (ref 0–2)
BILIRUB SERPL-MCNC: 0.4 MG/DL — SIGNIFICANT CHANGE UP (ref 0.2–1.2)
BUN SERPL-MCNC: 12 MG/DL — SIGNIFICANT CHANGE UP (ref 7–23)
CALCIUM SERPL-MCNC: 8.1 MG/DL — LOW (ref 8.4–10.5)
CHLORIDE SERPL-SCNC: 102 MMOL/L — SIGNIFICANT CHANGE UP (ref 98–107)
CO2 SERPL-SCNC: 22 MMOL/L — SIGNIFICANT CHANGE UP (ref 22–31)
CREAT SERPL-MCNC: 0.71 MG/DL — SIGNIFICANT CHANGE UP (ref 0.5–1.3)
EGFR: 107 ML/MIN/1.73M2 — SIGNIFICANT CHANGE UP
EOSINOPHIL # BLD AUTO: 0 K/UL — SIGNIFICANT CHANGE UP (ref 0–0.5)
EOSINOPHIL NFR BLD AUTO: 0 % — SIGNIFICANT CHANGE UP (ref 0–6)
ESTIMATED AVERAGE GLUCOSE: 100 — SIGNIFICANT CHANGE UP
GLUCOSE BLDC GLUCOMTR-MCNC: 126 MG/DL — HIGH (ref 70–99)
GLUCOSE BLDC GLUCOMTR-MCNC: 131 MG/DL — HIGH (ref 70–99)
GLUCOSE SERPL-MCNC: 138 MG/DL — HIGH (ref 70–99)
HCT VFR BLD CALC: 35 % — SIGNIFICANT CHANGE UP (ref 34.5–45)
HGB BLD-MCNC: 11.1 G/DL — LOW (ref 11.5–15.5)
IANC: 7.12 K/UL — SIGNIFICANT CHANGE UP (ref 1.8–7.4)
IMM GRANULOCYTES NFR BLD AUTO: 0.4 % — SIGNIFICANT CHANGE UP (ref 0–0.9)
LYMPHOCYTES # BLD AUTO: 0.79 K/UL — LOW (ref 1–3.3)
LYMPHOCYTES # BLD AUTO: 9.2 % — LOW (ref 13–44)
MAGNESIUM SERPL-MCNC: 2 MG/DL — SIGNIFICANT CHANGE UP (ref 1.6–2.6)
MCHC RBC-ENTMCNC: 27.4 PG — SIGNIFICANT CHANGE UP (ref 27–34)
MCHC RBC-ENTMCNC: 31.7 GM/DL — LOW (ref 32–36)
MCV RBC AUTO: 86.4 FL — SIGNIFICANT CHANGE UP (ref 80–100)
MONOCYTES # BLD AUTO: 0.62 K/UL — SIGNIFICANT CHANGE UP (ref 0–0.9)
MONOCYTES NFR BLD AUTO: 7.2 % — SIGNIFICANT CHANGE UP (ref 2–14)
NEUTROPHILS # BLD AUTO: 7.12 K/UL — SIGNIFICANT CHANGE UP (ref 1.8–7.4)
NEUTROPHILS NFR BLD AUTO: 83.1 % — HIGH (ref 43–77)
NRBC # BLD: 0 /100 WBCS — SIGNIFICANT CHANGE UP (ref 0–0)
NRBC # FLD: 0 K/UL — SIGNIFICANT CHANGE UP (ref 0–0)
PLATELET # BLD AUTO: 283 K/UL — SIGNIFICANT CHANGE UP (ref 150–400)
POTASSIUM SERPL-MCNC: 4.1 MMOL/L — SIGNIFICANT CHANGE UP (ref 3.5–5.3)
POTASSIUM SERPL-SCNC: 4.1 MMOL/L — SIGNIFICANT CHANGE UP (ref 3.5–5.3)
PROT SERPL-MCNC: 6.6 G/DL — SIGNIFICANT CHANGE UP (ref 6–8.3)
RBC # BLD: 4.05 M/UL — SIGNIFICANT CHANGE UP (ref 3.8–5.2)
RBC # FLD: 21.2 % — HIGH (ref 10.3–14.5)
SODIUM SERPL-SCNC: 134 MMOL/L — LOW (ref 135–145)
WBC # BLD: 8.57 K/UL — SIGNIFICANT CHANGE UP (ref 3.8–10.5)
WBC # FLD AUTO: 8.57 K/UL — SIGNIFICANT CHANGE UP (ref 3.8–10.5)

## 2024-10-17 RX ORDER — OXYCODONE HYDROCHLORIDE 30 MG/1
1 TABLET, FILM COATED, EXTENDED RELEASE ORAL
Qty: 8 | Refills: 0
Start: 2024-10-17

## 2024-10-17 RX ORDER — ANTACID TABLETS 500 MG/1
1 TABLET, CHEWABLE ORAL ONCE
Refills: 0 | Status: COMPLETED | OUTPATIENT
Start: 2024-10-17 | End: 2024-10-17

## 2024-10-17 RX ADMIN — KETOROLAC TROMETHAMINE 15 MILLIGRAM(S): 10 TABLET, FILM COATED ORAL at 07:03

## 2024-10-17 RX ADMIN — KETOROLAC TROMETHAMINE 15 MILLIGRAM(S): 10 TABLET, FILM COATED ORAL at 02:00

## 2024-10-17 RX ADMIN — Medication 3 MILLILITER(S): at 14:00

## 2024-10-17 RX ADMIN — KETOROLAC TROMETHAMINE 15 MILLIGRAM(S): 10 TABLET, FILM COATED ORAL at 06:33

## 2024-10-17 RX ADMIN — KETOROLAC TROMETHAMINE 15 MILLIGRAM(S): 10 TABLET, FILM COATED ORAL at 01:00

## 2024-10-17 RX ADMIN — Medication 3 MILLILITER(S): at 05:10

## 2024-10-17 RX ADMIN — Medication 125 MILLILITER(S): at 09:20

## 2024-10-17 RX ADMIN — Medication 5000 UNIT(S): at 09:20

## 2024-10-17 RX ADMIN — Medication 600 MILLIGRAM(S): at 11:59

## 2024-10-17 RX ADMIN — Medication 125 MILLILITER(S): at 06:33

## 2024-10-17 RX ADMIN — Medication 5000 UNIT(S): at 16:01

## 2024-10-17 RX ADMIN — ANTACID TABLETS 1 TABLET(S): 500 TABLET, CHEWABLE ORAL at 06:39

## 2024-10-17 NOTE — DISCHARGE NOTE PROVIDER - HOSPITAL COURSE
Patient underwent an uncomplicated scheduled -[facility] BSO SLNM, repair of bilateral sulcal and 1st degree vaginal laceration. .    On POD#1, Zabala catheter was discontinued and the patient was able to void spontaneously thereafter. Her H/H had an appropriate drop given her EBL 12.2/37.3 -> 11/35.0. She tolerated PO, was ambulating at baseline, and pain was well controlled. She was found to be mildly hypertensive with SBP >160 which required Lopressor 5 PRN and hydralazine 10 PRN.     Upon discharge on POD_, the patient is ambulating and voiding spontaneously, tolerating oral intake, pain was well controlled with oral medication, and vital signs were stable. Patient underwent an uncomplicated scheduled -[facility] BSO SLNM, repair of bilateral sulcal and 1st degree vaginal laceration. .    On POD#1, Zabala catheter was discontinued and the patient was able to void spontaneously thereafter. Her H/H had an appropriate drop given her EBL 12.2/37.3 -> 11/35.0. She tolerated PO, was ambulating at baseline, and pain was well controlled. She was found to be mildly hypertensive with SBP >160 which required Lopressor 5 PRN and hydralazine 10 PRN.     Upon discharge on POD1, the patient is ambulating and voiding spontaneously, tolerating oral intake, pain was well controlled with oral medication, and vital signs were stable.

## 2024-10-17 NOTE — DISCHARGE NOTE PROVIDER - NSDCFUADDINST_GEN_ALL_CORE_FT
Regular diet as tolerated, regular activity as tolerated, no heavy lifting for first two weeks.  Nothing per vagina: no intercourse, tampons or douching.  Call your provider if you experience fevers, chills, worsening abdominal pain, inability to urinate or other concerning symptoms.  Follow up with your provider in 2 weeks. Postoperative Instructions    Pain control    For pain control, take the following:  Motrin 600mg every 6 hours with food. Add oxycodone as needed for severe pain not managed well by Motrin. A prescription has been sent to [facility] located inside [facility].   Motrin can be obtained over the counter.  Incision Care  Keep your incision(s) clean and dry. It is ok to shower, but do not scrub the incision sites--just allow the water to gently wash over your skin. Remove the outer dressing(s)--the band-aids--the second day after your surgery. There are small pieces of tape called steri-strips over the incisions underneath these dressings. Steri strips will be removed at your postoperative appointment.    Postoperative restrictions  Nothing in the vagina (tampons, sexual intercourse), No tub baths, pools or hot tubs for 8 weeks (showers are ok!). No lifting anything heavier than 15 lbs, no strenuous exercise for 8 weeks after surgery. Do not pull or cut any stitches that you see around your incision.    Vaginal bleeding   Spotting and intermittent passage of blood clots per vagina is normal in first few weeks after surgery. If you are soaking 1 pad per hour, that is not normal and you should notify Dr. Gamboa's office and seek medical attention right away.    Vaginal discharge   Vaginal discharge (all colors) is normal after vaginal surgery. If you’ve had vaginal surgery, you have sutures in your vagina which take 3 months to fully absorb. You may have vaginal discharge during this time. This is normal.    Signs of Infection  Some postoperative pain and discomfort is normal. However, if you experience any of the following, you may be developing an infection and should be seen by your doctor: pain that does not get better with the oral medications listed above, fever greater than 100.4F, foul smelling discharge coming from the surgical site, nausea and vomiting that does not stop (especially if you are unable to tolerate oral intake), or inability to urinate. If you experience any of these symptoms, call your doctor's office to be seen right away.    Follow Up  Call Dr. Dr. Gamboa 's office to schedule a postoperative appointment in 2 weeks. The results from the procedure will be discussed with you at that time.

## 2024-10-17 NOTE — BRIEF OPERATIVE NOTE - COMMENTS
I, Anjelica Ham PA-C, served as the first assistant in this operation. I assisted in placing ports, docking, and targeting the [place]amaria robot, first assisted at the surgical field while the surgeon was performing the operation at the robotic console by providing instrument exchanges, tissue retraction, suction and irrigation, specimen retrieval, passing and removing sutures and sponges, undocking the robotic platform, and closed surgical wounds.

## 2024-10-17 NOTE — DISCHARGE NOTE NURSING/CASE MANAGEMENT/SOCIAL WORK - PATIENT PORTAL LINK FT
You can access the FollowMyHealth Patient Portal offered by [facility] by registering at the following website: [email or web address]. By joining [facility]’s FollowMyHealth portal, you will also be able to view your health information using other applications (apps) compatible with our system.

## 2024-10-17 NOTE — DISCHARGE NOTE PROVIDER - NSDCCPCAREPLAN_GEN_ALL_CORE_FT
PRINCIPAL DISCHARGE DIAGNOSIS  Diagnosis: Endometrioid carcinoma of endometrium  Assessment and Plan of Treatment: FIGO 1

## 2024-10-17 NOTE — CONSULT NOTE ADULT - NS ATTEND AMEND GEN_ALL_CORE FT
Agree with above assessment and plan.   Changes made in the above note. Follow up with me outpatient after discharge

## 2024-10-17 NOTE — CONSULT NOTE ADULT - SUBJECTIVE AND OBJECTIVE BOX
Reason for consult: Endometrial ca    HPI:   45 yo female s/p  Robotic assisted Total Laparoscopic Hysterectomy, Bilateral salpingo-oophorectomy, sentinel pelvic lymph node mapping and dissection, cystoscopy, Bilateral ureterolysis, repair of vaginal laceration for FIGO 1 Endometriod Endometrial CA. recovering well    Oncology consultation completed for this 44 year old female initially seen in clinic on 9/30 d/t profound anemia and a mass noted on CT a/p c/f neoplasm. Outpatient imaging ­­CTA c/a/p w/ Left hepatic lobe 5 x 2.7 cm mass.  ­­MR a/p w/ Partially imaged uterus with endometrial thickening. Multiple liver lesions, concerning for malignancy. Dominant left hepatic lobe mass measuring 5.4 cm, for which differential includes a primary hepatobiliary lesion with consideration given to  cholangiocarcinoma or metastasis. Abdominal lymphadenopathy and retroperitoneal lymph nodes. Admitted for Hysterectomy      PAST MEDICAL & SURGICAL HISTORY:  No pertinent past medical history                FAMILY HISTORY:      Alochol: Denied  Smoking: Nonsmoker  Drug Use: Denied  Marital Status:         Allergies    No Known Allergies    Intolerances        MEDICATIONS  (STANDING):  dextrose 5%. 1000 milliLiter(s) (50 mL/Hr) IV Continuous <Continuous>  dextrose 5%. 1000 milliLiter(s) (100 mL/Hr) IV Continuous <Continuous>  dextrose 50% Injectable 25 Gram(s) IV Push once  dextrose 50% Injectable 12.5 Gram(s) IV Push once  dextrose 50% Injectable 25 Gram(s) IV Push once  glucagon  Injectable 1 milliGRAM(s) IntraMuscular once  heparin   Injectable 5000 Unit(s) SubCutaneous every 8 hours  ibuprofen  Tablet. 600 milliGRAM(s) Oral every 6 hours  insulin lispro (ADMELOG) corrective regimen sliding scale   SubCutaneous at bedtime  insulin lispro (ADMELOG) corrective regimen sliding scale   SubCutaneous three times a day before meals  lactated ringers. 1000 milliLiter(s) (125 mL/Hr) IV Continuous <Continuous>  sodium chloride 0.9% lock flush 3 milliLiter(s) IV Push every 8 hours    MEDICATIONS  (PRN):  dextrose Oral Gel 15 Gram(s) Oral once PRN Blood Glucose LESS THAN 70 milliGRAM(s)/deciliter  metoprolol tartrate Injectable 5 milliGRAM(s) IV Push every 6 hours PRN SBP >/= 160, DBP >/= 110  oxyCODONE    IR 5 milliGRAM(s) Oral every 4 hours PRN Severe Pain (7 - 10)  senna 2 Tablet(s) Oral at bedtime PRN Constipation  simethicone 80 milliGRAM(s) Chew every 4 hours PRN Gas      ROS  No fever, sweats, chills  No epistaxis, HA, sore throat  No CP, SOB, cough, sputum  No n/v/d, abd pain, melena, hematochezia  No edema  No rash  No anxiety  No back pain, joint pain  No bleeding, bruising  No dysuria, hematuria    T(C): 37.3 (10-17-24 @ 09:40), Max: 37.3 (10-17-24 @ 09:40)  HR: 82 (10-17-24 @ 09:40) (65 - 88)  BP: 150/64 (10-17-24 @ 09:40) (135/53 - 195/82)  RR: 18 (10-17-24 @ 09:40) (12 - 20)  SpO2: 98% (10-17-24 @ 09:40) (97% - 100%)  Wt(kg): --    PE  NAD  Awake, alert  Anicteric, MMM  RRR  CTAB  Abd soft, NT, ND  No c/c/e  No rash grossly  FROM                          11.1   8.57  )-----------( 283      ( 17 Oct 2024 04:52 )             35.0       10-17    134[L]  |  102  |  12  ----------------------------<  138[H]  4.1   |  22  |  0.71    Ca    8.1[L]      17 Oct 2024 04:52  Mg     2.00     10-17    TPro  6.6  /  Alb  3.5  /  TBili  0.4  /  DBili  x   /  AST  29  /  ALT  17  /  AlkPhos  64  10-17   Reason for consult: Endometrial ca    HPI:   43 yo female s/p  Robotic assisted Total Laparoscopic Hysterectomy, Bilateral salpingo-oophorectomy, sentinel pelvic lymph node mapping and dissection, cystoscopy, Bilateral ureterolysis, repair of vaginal laceration for FIGO 1 Endometriod Endometrial CA. recovering well    Oncology consultation completed for this 44 year old female initially seen in clinic on 9/30 d/t profound anemia and a mass noted on CT a/p c/f neoplasm. Outpatient imaging ­­CTA c/a/p w/ Left hepatic lobe 5 x 2.7 cm mass.  ­­MR a/p w/ Partially imaged uterus with endometrial thickening. Multiple liver lesions, concerning for malignancy. Dominant left hepatic lobe mass measuring 5.4 cm, for which differential includes a primary hepatobiliary lesion with consideration given to  cholangiocarcinoma or metastasis. Abdominal lymphadenopathy and retroperitoneal lymph nodes. Admitted for Hysterectomy      PAST MEDICAL & SURGICAL HISTORY:  No pertinent past medical history      FAMILY HISTORY:      Alochol: Denied  Smoking: Nonsmoker  Drug Use: Denied  Marital Status:         Allergies    No Known Allergies    Intolerances        MEDICATIONS  (STANDING):  dextrose 5%. 1000 milliLiter(s) (50 mL/Hr) IV Continuous <Continuous>  dextrose 5%. 1000 milliLiter(s) (100 mL/Hr) IV Continuous <Continuous>  dextrose 50% Injectable 25 Gram(s) IV Push once  dextrose 50% Injectable 12.5 Gram(s) IV Push once  dextrose 50% Injectable 25 Gram(s) IV Push once  glucagon  Injectable 1 milliGRAM(s) IntraMuscular once  heparin   Injectable 5000 Unit(s) SubCutaneous every 8 hours  ibuprofen  Tablet. 600 milliGRAM(s) Oral every 6 hours  insulin lispro (ADMELOG) corrective regimen sliding scale   SubCutaneous at bedtime  insulin lispro (ADMELOG) corrective regimen sliding scale   SubCutaneous three times a day before meals  lactated ringers. 1000 milliLiter(s) (125 mL/Hr) IV Continuous <Continuous>  sodium chloride 0.9% lock flush 3 milliLiter(s) IV Push every 8 hours    MEDICATIONS  (PRN):  dextrose Oral Gel 15 Gram(s) Oral once PRN Blood Glucose LESS THAN 70 milliGRAM(s)/deciliter  metoprolol tartrate Injectable 5 milliGRAM(s) IV Push every 6 hours PRN SBP >/= 160, DBP >/= 110  oxyCODONE    IR 5 milliGRAM(s) Oral every 4 hours PRN Severe Pain (7 - 10)  senna 2 Tablet(s) Oral at bedtime PRN Constipation  simethicone 80 milliGRAM(s) Chew every 4 hours PRN Gas      ROS  No fever, sweats, chills  No epistaxis, HA, sore throat  No CP, SOB, cough, sputum  No n/v/d, abd pain, melena, hematochezia  No edema  No rash  No anxiety  No back pain, joint pain  No bleeding, bruising  No dysuria, hematuria    T(C): 37.3 (10-17-24 @ 09:40), Max: 37.3 (10-17-24 @ 09:40)  HR: 82 (10-17-24 @ 09:40) (65 - 88)  BP: 150/64 (10-17-24 @ 09:40) (135/53 - 195/82)  RR: 18 (10-17-24 @ 09:40) (12 - 20)  SpO2: 98% (10-17-24 @ 09:40) (97% - 100%)  Wt(kg): --    PE  NAD  Awake, alert  Anicteric, MMM  RRR  CTAB  Abd soft, NT, ND  No c/c/e  No rash grossly                            11.1   8.57  )-----------( 283      ( 17 Oct 2024 04:52 )             35.0       10-17    134[L]  |  102  |  12  ----------------------------<  138[H]  4.1   |  22  |  0.71    Ca    8.1[L]      17 Oct 2024 04:52  Mg     2.00     10-17    TPro  6.6  /  Alb  3.5  /  TBili  0.4  /  DBili  x   /  AST  29  /  ALT  17  /  AlkPhos  64  10-17

## 2024-10-17 NOTE — DISCHARGE NOTE PROVIDER - NSDCFUSCHEDAPPT_GEN_ALL_CORE_FT
Kylie Gamboa  [facility]  DE[facility] VIRGEN  Scheduled Appointment: 10/20/2024    [facility]  INTMED  [facility]   Scheduled Appointment: 10/22/2024

## 2024-10-17 NOTE — DISCHARGE NOTE NURSING/CASE MANAGEMENT/SOCIAL WORK - FINANCIAL ASSISTANCE
[facility] provides services at a reduced cost to those who are determined to be eligible through [facility]’s financial assistance program. Information regarding [facility]’s financial assistance program can be found by going to [email or web address] or by calling [phone].

## 2024-10-17 NOTE — DISCHARGE NOTE NURSING/CASE MANAGEMENT/SOCIAL WORK - NSDCPNINST_GEN_ALL_CORE
pt ambulating, eating, voiding without difficulty. iv discontinued. no distress noted. lap sites with telfa/gauze intact. patient verbalized understanding of discharge care.                                                                           call md for follow up appt. drink 9-13 eight oz. glasses of fluid daily. call md for sign of infection (temp greater than 100.4f, redness at incision, pain not relieved by meds).

## 2024-10-17 NOTE — PROGRESS NOTE ADULT - SUBJECTIVE AND OBJECTIVE BOX
R2 GYN Progress Note    POD#1   HD#2    Patient seen and examined at bedside.  No acute events overnight. No acute complaints.  Pain well controlled.  Patient has not yet been out of bed since surgery.  Has not yet passed flatus.    Zabala is still in place.   Denies CP, SOB, N/V, fevers, and chills.    Vital Signs Last 24 Hours  T(C): 37.1 (10-17-24 @ 05:06), Max: 37.1 (10-17-24 @ 01:47)  HR: 81 (10-17-24 @ 05:06) (65 - 88)  BP: 139/60 (10-17-24 @ 05:06) (135/53 - 195/82)  RR: 16 (10-17-24 @ 05:06) (12 - 20)  SpO2: 98% (10-17-24 @ 05:06) (97% - 100%)    I&O's Summary    16 Oct 2024 07:01  -  17 Oct 2024 06:22  --------------------------------------------------------  IN: 1365 mL / OUT: 845 mL / NET: 520 mL        Physical Exam:  General: NAD  CV: RRR  Lungs: CTA b/l  Abdomen: Soft, appropriately-tender, no rebound or guarding  Incision: Laparoscopic port sites with overlying steri strips and Op Site dressings CDI  : Zabala draining clear, nonbloody urine. No vaginal bleeding  Ext: No calf tenderness or swelling     Labs:                        11.1   8.57  )-----------( 283      ( 17 Oct 2024 04:52 )             35.0   baso 0.1    eos 0.0    imm gran 0.4    lymph 9.2    mono 7.2    poly 83.1                         12.2   4.89  )-----------( 288      ( 14 Oct 2024 14:43 )             37.3   baso 0.8    eos 1.8    imm gran 0.2    lymph 24.7   mono 11.2   poly 61.3       MEDICATIONS  (STANDING):  dextrose 5%. 1000 milliLiter(s) (50 mL/Hr) IV Continuous <Continuous>  dextrose 5%. 1000 milliLiter(s) (100 mL/Hr) IV Continuous <Continuous>  dextrose 50% Injectable 25 Gram(s) IV Push once  dextrose 50% Injectable 12.5 Gram(s) IV Push once  dextrose 50% Injectable 25 Gram(s) IV Push once  glucagon  Injectable 1 milliGRAM(s) IntraMuscular once  insulin lispro (ADMELOG) corrective regimen sliding scale   SubCutaneous three times a day before meals  insulin lispro (ADMELOG) corrective regimen sliding scale   SubCutaneous at bedtime  ketorolac   Injectable 15 milliGRAM(s) IV Push every 6 hours  lactated ringers. 1000 milliLiter(s) (125 mL/Hr) IV Continuous <Continuous>  sodium chloride 0.9% lock flush 3 milliLiter(s) IV Push every 8 hours    MEDICATIONS  (PRN):  dextrose Oral Gel 15 Gram(s) Oral once PRN Blood Glucose LESS THAN 70 milliGRAM(s)/deciliter  metoprolol tartrate Injectable 5 milliGRAM(s) IV Push every 6 hours PRN SBP >/= 160, DBP >/= 110  oxyCODONE    IR 5 milliGRAM(s) Oral every 4 hours PRN Severe Pain (7 - 10)  senna 2 Tablet(s) Oral at bedtime PRN Constipation  simethicone 80 milliGRAM(s) Chew every 4 hours PRN Gas

## 2024-10-17 NOTE — DISCHARGE NOTE NURSING/CASE MANAGEMENT/SOCIAL WORK - NSDCPECAREGIVERED_GEN_ALL_CORE
call md for follow up appt.  drink 9-13 eight oz. glasses of fluid daily. call md for sign of infection (temp greater than 100.4f, redness at incision, pain not relieved by meds)./No pt ambulating, eating, voiding without difficulty. iv discontinued. no distress noted. lap sites with telfa/gauze intact. patient verbalized understanding of discharge care./No

## 2024-10-17 NOTE — PROGRESS NOTE ADULT - ASSESSMENT
A/P: 44y POD#1 s/p RA-TLH BSO SLNM, repair of bilateral sulcal and 1st degree vaginal laceration ().  Patient hemodynamically stable and progressing well post operatively.    Neuro: Pain well controlled on IV pain meds. Consider transition to PO pain meds today.  - Avoid Tylenol given hepatomegaly/liver cancer  CV: Hemodynamically stable  - H/H with appropriate drop given EBL 100cc 12.2/37.3 -> 11/35.0  - cw BP monitoring, BP: 139/60 (10-17-24 @ 05:06) (135/53 - 168/75), required Lopressor PRN and Hydral 10 overnight for elevated SBP >160.  Pulm: Saturating well on room air, encourage oob/amb  GI: Continue regular diet  : UOP adequate (645cc overnight). D/c stringer today.  Heme: SCDs for DVT ppx. Consider started HSQ today.  FEN: LR@125. Replete electrolytes prn.   ID: Afebrile  Endo: ISS (-170 overnight)  Dispo: Continue routine post-op care.      Ariella Bcuk PGY-2

## 2024-10-17 NOTE — PROGRESS NOTE ADULT - NSPROGADDITIONALINFOA_GEN_ALL_CORE
Fellow addendum    POD#1 s/p RA-TLH/BSO/SLND, repair of vaginal lacs    H/H stable  Pain well controlled, transition to PO meds. Hold tylenol  Tolerating reg diet. Abd soft, ND.   Plan for TOV today  Hypertensive overnight however most recently 130s/60s  Replete lytes prn  Encouraged ambulation  DVT ppx: SQH, venodynes    Dispo: anticipate dc today after TOV    Kristie Isidro MD

## 2024-10-17 NOTE — DISCHARGE NOTE PROVIDER - NSDCCPTREATMENT_GEN_ALL_CORE_FT
PRINCIPAL PROCEDURE  Procedure: Robot-assisted laparoscopic total hysterectomy with bilateral salpingo-oophorectomy (BSO) and cystoscopy using da Anamaria Xi  Findings and Treatment:       SECONDARY PROCEDURE  Procedure: [place] lymph node mapping  Findings and Treatment:     Procedure: Repair of vaginal tear on both sides  Findings and Treatment:     Procedure: Repair, laceration, perineal  Findings and Treatment: 1st degree

## 2024-10-17 NOTE — CONSULT NOTE ADULT - ASSESSMENT
Endometrial cancer  -likely cholangiocarcinoma, will treat outpatient with [place] + Cis + Durva pending endoscopic work up  - s/p  Robotic assisted Total Laparoscopic Hysterectomy, Bilateral salpingo-oophorectomy, sentinel pelvic lymph node mapping and dissection, cystoscopy, Bilateral ureterolysis  -await pathology  Dr Petit to place chemo port outpatient   -Follow up Dr Hayley URIAS after discharge      Anemia  -s/p IV Iron outpatient  -Counts stable  -Transfuse for Hgb < 7.0  -Gyn onc recs appreciated      Michelle Rm NP  Hematology/Oncology  [facility]  [phone] (Office)  [phone] (Alt office)  Evenings and weekends please call MD on call or office       Endometrial cancer  - FIGO Grade 1, localized endometrioid endometrial carcinoma now s/p Robotic assisted Total Laparoscopic Hysterectomy, Bilateral salpingo-oophorectomy, sentinel pelvic lymph node mapping and dissection, cystoscopy, Bilateral ureterolysis, repair of vaginal laceration.   - follow up pathology   - appreciate GYN Onc care with Dr Gray-Chata       Liver mass  -likely cholangiocarcinoma pending endoscopic evaluation outpatient   - PET/CT 10/11/24 multiple hypermetabolic hepatic lesions, mor prominent in the left lobe compatible with primary or metastatic process. Hypermetabolic soft tissue lesion, sitting posterior to the stomach antrum and slightly superior pancreatic head, suspicious for pancreatic head mass or heptoportal lymphadenopathy. Hypermetabolic costophrenic node and anterior left liver capsular nodularity are suspicious for metastatic presentation. Bulky endometrium hypermetabolic suspicious for malignant process.   - MRI Abdomen 9/10/24: Partially imaged uterus with endometrial thickening. Recommend further evaluation with pelvic ultrasound and gynecological consultation. Multiple liver lesions, concerning for malignancy. Dominant left hepatic lobe mass measuring 5.4 cm, for which differential includes a primary hepatobiliary lesion with consideration given to cholangiocarcinoma or metastasis. Additional lesions seen in the left hepatic lobe are suspicious for metastases. Nonspecific subcentimeter focus of restricted diffusion in the right hepatic lobe without definite  correlate seen on additional sequences, possibly an additional metastasis. Abdominal lymphadenopathy and prominent retroperitoneal lymph nodes. Enlarged right cardiophrenic lymph node. Subcentimeter nodule inseparable from the anterior margin of the left hepatic lobe, concerning for a pericapsular implant versus exophytic extension of liver tumor. Mild peritoneal nodularity in the anterior  midline upper abdomen. Peritoneal carcinomatosis is not excluded. Hepatosplenomegaly. Contracted gallbladder with nonspecific wall thickening. Small bilateral pleural effusions.  - Liver biopsy 10/2/24 adenocarcinoma. Pancreaticobiliary among other possible sites such as GI. MSI intact. Not consistent with HCC or endometrial   - If endoscopy is unremarkable will treat as cholangiocarcinoma with [place]/Cis/Durva   - Surg onc eval and follow up outpatient with Dr Blas Petit who will also place chemoport   -Follow up Dr Hayley URIAS after discharge      Anemia  -s/p IV Iron outpatient  -Counts stable  -Transfuse for Hgb < 7.0  -Gyn onc recs appreciated      Michelle Rm NP  Hematology/Oncology  [facility]  [phone] (Office)  [phone] (Alt office)  Evenings and weekends please call MD on call or office

## 2024-10-17 NOTE — DISCHARGE NOTE PROVIDER - CARE PROVIDER_API CALL
Kylie Gamboa  Gynecologic Oncology  [address] [place] [id number]  Phone: [phone]  Fax: [phone]  Follow Up Time: 2 weeks

## 2024-10-17 NOTE — DISCHARGE NOTE PROVIDER - NSDCMRMEDTOKEN_GEN_ALL_CORE_FT
Motrin 600 mg oral tablet: 1 tab(s) orally every 6 hours  oxyCODONE 5 mg oral tablet: 1 tab(s) orally every 6 hours as needed for  severe pain MDD: 4

## 2024-10-18 ENCOUNTER — NON-APPOINTMENT (OUTPATIENT)
Age: 44
End: 2024-10-18

## 2024-10-18 LAB — NON-GYNECOLOGICAL CYTOLOGY STUDY: SIGNIFICANT CHANGE UP

## 2024-10-21 ENCOUNTER — OUTPATIENT (OUTPATIENT)
Dept: OUTPATIENT SERVICES | Facility: HOSPITAL | Age: 44
LOS: 1 days | End: 2024-10-21
Payer: MEDICAID

## 2024-10-21 VITALS
WEIGHT: 220.9 LBS | OXYGEN SATURATION: 98 % | HEART RATE: 85 BPM | RESPIRATION RATE: 17 BRPM | DIASTOLIC BLOOD PRESSURE: 83 MMHG | SYSTOLIC BLOOD PRESSURE: 141 MMHG | HEIGHT: 61 IN | TEMPERATURE: 98 F

## 2024-10-21 DIAGNOSIS — Z92.89 PERSONAL HISTORY OF OTHER MEDICAL TREATMENT: Chronic | ICD-10-CM

## 2024-10-21 DIAGNOSIS — R16.0 HEPATOMEGALY, NOT ELSEWHERE CLASSIFIED: ICD-10-CM

## 2024-10-21 DIAGNOSIS — C54.1 MALIGNANT NEOPLASM OF ENDOMETRIUM: ICD-10-CM

## 2024-10-21 DIAGNOSIS — E66.01 MORBID (SEVERE) OBESITY DUE TO EXCESS CALORIES: ICD-10-CM

## 2024-10-21 DIAGNOSIS — Z98.890 OTHER SPECIFIED POSTPROCEDURAL STATES: Chronic | ICD-10-CM

## 2024-10-21 PROBLEM — Z78.9 OTHER SPECIFIED HEALTH STATUS: Chronic | Status: INACTIVE | Noted: 2024-09-09 | Resolved: 2024-10-21
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
## 2024-10-21 NOTE — H&P PST ADULT - RESPIRATORY
clear to auscultation bilaterally/no wheezes/no rales/no rhonchi/no subcutaneous emphysema/airway patent/breath sounds equal

## 2024-10-21 NOTE — H&P PST ADULT - NEGATIVE ENMT SYMPTOMS
no tinnitus/no vertigo/no sinus symptoms/no nasal congestion/no gum bleeding/no dry mouth/no dysphagia

## 2024-10-21 NOTE — H&P PST ADULT - NSICDXPASTSURGICALHX_GEN_ALL_CORE_FT
PAST SURGICAL HISTORY:  No significant past surgical history      PAST SURGICAL HISTORY:  History of endometrial biopsy     History of liver biopsy

## 2024-10-21 NOTE — H&P PST ADULT - GASTROINTESTINAL
soft/nontender/normal active bowel sounds details… surgical scar healing, no S/S of infections noted, LLQ bruises noted/soft/nontender/normal active bowel sounds

## 2024-10-21 NOTE — H&P PST ADULT - ATTENDING COMMENTS
D/w pt plan for mediport insertion with fluoro.    Discussed r/b/a post op expectations poss complications incl bleeding, infection, device malfunction, PTX, need for additional procedures.      Pt understands and agrees to proceed.

## 2024-10-21 NOTE — H&P PST ADULT - NSANTHOSAYNRD_GEN_A_CORE
No. MARINO screening performed.  STOP BANG Legend: 0-2 = LOW Risk; 3-4 = INTERMEDIATE Risk; 5-8 = HIGH Risk

## 2024-10-21 NOTE — H&P PST ADULT - NEGATIVE GENERAL GENITOURINARY SYMPTOMS
no hematuria/no renal colic/no flank pain L/no gas in urine/no bladder infections/no dysuria no hematuria/no renal colic/no flank pain L/no flank pain R/no gas in urine/no bladder infections/no dysuria

## 2024-10-21 NOTE — H&P PST ADULT - PROBLEM SELECTOR PLAN 1
Schedule for Mediport insertion with Fluoro tentatively on 10/25/24. Pre op instructions, famotidine, chlorhexidine gluconate soap given and explained. Pt verbalized understanding.     ** CBC, CMP in [place] 10/17/24**  ?

## 2024-10-21 NOTE — H&P PST ADULT - HISTORY OF PRESENT ILLNESS
44 years old, recently diagnosed with FIGO 1 endometrioid endometrial cancer presenting for scheduled surgery with Dr. Brizuela. Also, patient had a recent liver biopsy on 10/2/2024 at [facility].  ?  Patient recently presented to [facility] ED on 2024 and discharged on 2024.  Hospital Course:  Hemoglobin was noted to be 3.7 on admission. Patient received four units of PRBCs. CBC monitored and stable.  ?  Denies f/c/n/v/d/changes to bowel or bladder habits. Denies unintentional weight loss or gain. Patient does report history of heavy vaginal bleeding in  and July. States she was changing her pads every 1/2 hour for 14 days. Denies any other symptoms.    Biopsy results now have revealed adenocarcinoma, likely of gastrointestinal origin and not endometrial origin. The patient still notes continued vaginal bleeding but denies any lightheadedness dizziness or signs or symptoms of anemia in general.     POB:   PGYN: Menarche age 12, LMP 10/2/2024  PMH: Type 2 diabetes  Meds: None  Allergies: NKDA  PSH: No past surgical history  Social Hx: Lives with her mother, non-smoker, occasional alcohol, no drugs.  FH: No family history of cancer  ?     44 year old Morbidly obese female with H/O: heavy vaginal bleeding in June and July. Patient was admitted to [facility] ED on 9/9/2024 and discharged on 9/13/2024. Hemoglobin was noted to be 3.7 on admission. Patient received four units of PRBCs. CBC monitored and stable. S/P endometrial Biopsy. Pt was diagnosed with FIGO 1 endometrioid endometrial cancer - S/P KILEY BSO on 10/16/24 with Dr. Brizuela. Pt presents for pre op evaluation for Mediport insertion with Fluoro tentatively on 10/25/24.  ?

## 2024-10-21 NOTE — H&P PST ADULT - REASON FOR ADMISSION
" they have t put a port in so that I can start the chemo" " They have to put a port in so that I can start the chemo"

## 2024-10-22 ENCOUNTER — APPOINTMENT (OUTPATIENT)
Dept: INTERNAL MEDICINE | Facility: CLINIC | Age: 44
End: 2024-10-22
Payer: MEDICAID

## 2024-10-22 ENCOUNTER — OUTPATIENT (OUTPATIENT)
Dept: OUTPATIENT SERVICES | Facility: HOSPITAL | Age: 44
LOS: 1 days | End: 2024-10-22
Payer: MEDICAID

## 2024-10-22 VITALS — DIASTOLIC BLOOD PRESSURE: 82 MMHG | SYSTOLIC BLOOD PRESSURE: 158 MMHG

## 2024-10-22 VITALS
HEART RATE: 87 BPM | SYSTOLIC BLOOD PRESSURE: 162 MMHG | HEIGHT: 60 IN | DIASTOLIC BLOOD PRESSURE: 84 MMHG | WEIGHT: 222 LBS | BODY MASS INDEX: 43.59 KG/M2 | OXYGEN SATURATION: 97 %

## 2024-10-22 DIAGNOSIS — Z98.890 OTHER SPECIFIED POSTPROCEDURAL STATES: Chronic | ICD-10-CM

## 2024-10-22 DIAGNOSIS — Z00.00 ENCOUNTER FOR GENERAL ADULT MEDICAL EXAMINATION W/OUT ABNORMAL FINDINGS: ICD-10-CM

## 2024-10-22 DIAGNOSIS — R73.03 PREDIABETES.: ICD-10-CM

## 2024-10-22 DIAGNOSIS — Z23 ENCOUNTER FOR IMMUNIZATION: ICD-10-CM

## 2024-10-22 DIAGNOSIS — I10 ESSENTIAL (PRIMARY) HYPERTENSION: ICD-10-CM

## 2024-10-22 DIAGNOSIS — E66.01 MORBID (SEVERE) OBESITY DUE TO EXCESS CALORIES: ICD-10-CM

## 2024-10-22 DIAGNOSIS — Z83.3 FAMILY HISTORY OF DIABETES MELLITUS: ICD-10-CM

## 2024-10-22 PROCEDURE — 90656 IIV3 VACC NO PRSV 0.5 ML IM: CPT

## 2024-10-22 PROCEDURE — 99204 OFFICE O/P NEW MOD 45 MIN: CPT | Mod: GC,25

## 2024-10-22 PROCEDURE — G0463: CPT

## 2024-10-22 PROCEDURE — G0008: CPT

## 2024-10-22 RX ORDER — AMLODIPINE BESYLATE 5 MG/1
5 TABLET ORAL
Qty: 60 | Refills: 0 | Status: ACTIVE | COMMUNITY
Start: 2024-10-22 | End: 1900-01-01

## 2024-10-23 PROBLEM — C54.1 MALIGNANT NEOPLASM OF ENDOMETRIUM: Chronic | Status: ACTIVE | Noted: 2024-10-21

## 2024-10-23 PROBLEM — E66.01 MORBID (SEVERE) OBESITY DUE TO EXCESS CALORIES: Chronic | Status: ACTIVE | Noted: 2024-10-21

## 2024-10-24 ENCOUNTER — NON-APPOINTMENT (OUTPATIENT)
Age: 44
End: 2024-10-24

## 2024-10-24 ENCOUNTER — APPOINTMENT (OUTPATIENT)
Dept: SURGICAL ONCOLOGY | Facility: CLINIC | Age: 44
End: 2024-10-24
Payer: MEDICAID

## 2024-10-24 DIAGNOSIS — C54.1 MALIGNANT NEOPLASM OF ENDOMETRIUM: ICD-10-CM

## 2024-10-24 DIAGNOSIS — R16.0 HEPATOMEGALY, NOT ELSEWHERE CLASSIFIED: ICD-10-CM

## 2024-10-24 PROCEDURE — 99204 OFFICE O/P NEW MOD 45 MIN: CPT

## 2024-10-24 NOTE — ASU PATIENT PROFILE, ADULT - TRANSFUSION PREMEDICATION REQUIRED
Infectious Disease Progress Note      Patient: Supa Connell Date: 2024   : 1952 Attending: Marylou Nolasco MD   72 year old male        Subjective:   Patient is currently on 10 L of oxygen per nasal cannula.  Is tachypneic.  WBC also increased.  Lungs are coarse.  He has occasional productive cough.  He has no fever or chills.     Review of Systems:  Constitutional: Negative for chills and fever.   HENT: Negative for congestion, mouth sores and sore throat.    Eyes: Negative for visual disturbance.   Respiratory: Positive for cough and shortness of breath.    Cardiovascular: Negative for chest pain and palpitations.   Gastrointestinal: Negative for abdominal distention, abdominal pain, diarrhea, nausea and vomiting.   Genitourinary: Negative for difficulty urinating, dysuria, flank pain and frequency.   Musculoskeletal: Negative for back pain, joint swelling and myalgias.   Skin: Negative for rash.   Neurological: Negative for seizures, weakness and headaches.     Objective:  Vitals with min/max:      Vital Last Value 24 Hour Range   Temperature 96.8 °F (36 °C) (24 1320) Temp  Min: 96.8 °F (36 °C)  Max: 98.8 °F (37.1 °C)   Pulse (!) 51 (24 1232) Pulse  Min: 51  Max: 129   Respiratory 20 (24 1232) Resp  Min: 17  Max: 22   Non-Invasive  Blood Pressure 122/74 (24 1232) BP  Min: 97/62  Max: 128/56   Pulse Oximetry 95 % (24 1232) SpO2  Min: 90 %  Max: 100 %   Arterial   Blood Pressure   No data recorded          Physical Exam  Constitutional:       Appearance: In no acute distress.  Is ill-appearing.  HENT:      Head: Normocephalic and atraumatic.   Eyes:      Conjunctiva/sclera: Conjunctivae normal.      Cardiovascular:      Rate and Rhythm: Normal rate and regular rhythm.      Heart sounds: Normal heart sounds. No murmur heard.   Pulmonary:      Effort: No respiratory distress.      Breath sounds: Lungs are coarse all throughout.  +productive cough  On chronic  O2  Abdominal:      General: Bowel sounds are normal.      Palpations: Abdomen is soft. There is no mass.      Tenderness: There is no abdominal tenderness.   Musculoskeletal:         General: No tenderness.      Cervical back: Neck supple.   Lymphadenopathy:      Cervical: No cervical adenopathy.   Skin:     General: Skin is warm.      Findings: No erythema or rash.     Neurological:      Mental Status: Alert and oriented to person, place, and time.      Cranial Nerves: No cranial nerve deficit.   Psychiatric:         Behavior: Calm and cooperative.      Laboratory Results:  Recent Labs   Lab 06/25/24  0457 06/24/24  0634 06/23/24  0536 06/23/24  0535 06/22/24  0522   WBC 31.4* 19.3*  --  18.6* 18.0*   HCT 46.0 42.9  --  41.8 42.5   HGB 14.8 13.5  --  13.4 13.6    315  --  368 349   SODIUM  --  135 138  --  136   POTASSIUM  --  4.5 4.5  --  4.1   CHLORIDE  --  101 100  --  100   CO2  --  31 27  --  29   GLUCOSE  --  107* 108*  --  127*   BUN  --  15 17  --  14   CREATININE  --  0.33* 0.42*  --  0.36*       XR CHEST AP OR PA   Final Result   FINDINGS/IMPRESSION:      Distortion of lung architecture, coarse irregular interstitial markings and   bullous changes right upper lung field suggestive of extensive   fibroemphysematous changes.      Patchy ill-defined opacities bilateral mid and lower lung fields worsened   compared to 6/14/2024 and new compared to 5/27/2024. Blunting left   costophrenic angle worsened compared to 6/14/2024..      Findings could represent multifocal pneumonia with worsening compared to   prior study. Other underlying processes are not excluded.      Electronically Signed by: VENTURA VALERA M.D.    Signed on: 6/25/2024 9:54 AM    Workstation ID: [id number]      Bronchoscopy   Final Result      CTA CHEST PULMONARY EMBOLISM   Final Result   *   No evidence for pulmonary embolism, though distal evaluation is limited   due to motion artifact. IVC filter in place.   *   Worsened  consolidation in the right lower lobe as described above,   likely infectious in etiology.   *   Background severe emphysema. Other chronic findings as described above         Electronically Signed by: ANAYELI LOREDO M.D.    Signed on: 6/14/2024 1:53 PM    Workstation ID: [id number]      XR CHEST AP OR PA   Final Result   FINDINGS/IMPRESSION:      Fibroemphysematous changes bilaterally with elongation of cardiac   silhouette, elevation of hilar structures, distortion of lung architecture   with hyperlucency of lung fields and significant bullous changes right   upper lobe also visualized on prior chest x-ray dated 5/27/2024.      New extensive patchy airspace opacities right mid and lower lung field.      Findings suggestive of pneumonia.      Other underlying processes are not excluded. Continued follow-up required.      Electronically Signed by: VENTURA VALERA M.D.    Signed on: 6/14/2024 12:34 PM    Workstation ID: [id number]             Cultures:   Microbiology Results       None             Assessment:   1.  Pulmonary aspergillosis.    Increasing shortness of breath, respiratory secretions, CT scan with worsening consolidation right lower lobe.  Status post bronchoscopy BAL culture is now growing Aspergillus species.  Immunoglobulin tests are negative.   Blastomyces antibody is positive, Ag test is pending.  2.  Acute on chronic hypoxic respiratory failure on 3 to 4 L nasal cannula at baseline.  3.  Acute exacerbation of COPD.  4.  History of bronchiectasis.  5.  Leukocytosis, increasing.  May be exacerbated by steroid use also.  Patient is afebrile.    Plan:  Continue itraconazole 200 mg p.o. twice daily.  Continue Ambisome 4mg/kg IV daily.  Follow-up on fungal cultures.  Patient will need at least 6 months course of antifungals.  Monitor respiratory status/clinical progress.  Wean O2 as tolerated.  ID will follow and give further recommendations as needed.    Plan as discussed with patient,  primary RN, and attending physician Dr. Lopez.    Alena Abraham NP  6/25/2024 1:57 PM   none

## 2024-10-25 ENCOUNTER — RESULT REVIEW (OUTPATIENT)
Age: 44
End: 2024-10-25

## 2024-10-25 ENCOUNTER — APPOINTMENT (OUTPATIENT)
Dept: SURGICAL ONCOLOGY | Facility: HOSPITAL | Age: 44
End: 2024-10-25

## 2024-10-25 ENCOUNTER — OUTPATIENT (OUTPATIENT)
Dept: INPATIENT UNIT | Facility: HOSPITAL | Age: 44
LOS: 1 days | Discharge: HOME | End: 2024-10-25
Payer: MEDICAID

## 2024-10-25 ENCOUNTER — TRANSCRIPTION ENCOUNTER (OUTPATIENT)
Age: 44
End: 2024-10-25

## 2024-10-25 VITALS
RESPIRATION RATE: 16 BRPM | OXYGEN SATURATION: 98 % | DIASTOLIC BLOOD PRESSURE: 75 MMHG | HEART RATE: 81 BPM | SYSTOLIC BLOOD PRESSURE: 159 MMHG | HEIGHT: 61 IN | WEIGHT: 220.9 LBS | TEMPERATURE: 98 F

## 2024-10-25 DIAGNOSIS — C54.1 MALIGNANT NEOPLASM OF ENDOMETRIUM: ICD-10-CM

## 2024-10-25 DIAGNOSIS — Z98.890 OTHER SPECIFIED POSTPROCEDURAL STATES: Chronic | ICD-10-CM

## 2024-10-25 DIAGNOSIS — Z92.89 PERSONAL HISTORY OF OTHER MEDICAL TREATMENT: Chronic | ICD-10-CM

## 2024-10-25 PROCEDURE — 71045 X-RAY EXAM CHEST 1 VIEW: CPT | Mod: 26

## 2024-10-25 PROCEDURE — 77001 FLUOROGUIDE FOR VEIN DEVICE: CPT | Mod: 26

## 2024-10-25 PROCEDURE — 36561 INSERT TUNNELED CV CATH: CPT | Mod: LT

## 2024-10-25 DEVICE — PORT POWER ISP MRI 9.6SI: Type: IMPLANTABLE DEVICE | Status: FUNCTIONAL

## 2024-10-25 RX ORDER — ONDANSETRON HYDROCHLORIDE 2 MG/ML
4 INJECTION, SOLUTION INTRAMUSCULAR; INTRAVENOUS ONCE
Refills: 0 | Status: ACTIVE | OUTPATIENT
Start: 2024-10-25 | End: 2025-09-23

## 2024-10-25 RX ORDER — OXYCODONE HYDROCHLORIDE 30 MG/1
5 TABLET ORAL ONCE
Refills: 0 | Status: DISCONTINUED | OUTPATIENT
Start: 2024-10-25 | End: 2024-10-25

## 2024-10-25 RX ORDER — HYDROMORPHONE HCL/0.9% NACL/PF 6 MG/30 ML
0.5 PATIENT CONTROLLED ANALGESIA SYRINGE INTRAVENOUS
Refills: 0 | Status: DISCONTINUED | OUTPATIENT
Start: 2024-10-25 | End: 2024-10-25

## 2024-10-25 NOTE — BRIEF OPERATIVE NOTE - NSICDXBRIEFPROCEDURE_GEN_ALL_CORE_FT
PROCEDURES:  XR fluoro guided insertion of centrl venous cath w port via 2 access and 2 caths 25-Oct-2024 22:10:36  Sen Ceballos

## 2024-10-25 NOTE — ASU DISCHARGE PLAN (ADULT/PEDIATRIC) - ASU DC SPECIAL INSTRUCTIONSFT
WOUND CARE:  Please keep incisions clean and dry. Please do not Scrub or rub incisions. Do not use lotion or powder on incisions.   BATHING: You may shower and/or sponge bathe. You may use warm soapy water in the shower and rinse, pat dry.  ACTIVITY: No heavy lifting or straining. Otherwise, you may return to your usual level of physical activity. If you are taking narcotic pain medication DO NOT drive a car, operate machinery or make important decisions.  DIET: Return to your usual diet.  NOTIFY YOUR SURGEON IF YOU HAVE: any bleeding that does not stop, any pus draining from your wound(s), any fever (over 100.4 F) persistent nausea/vomiting, or if your pain is not controlled on your discharge pain medications, unable to urinate.  Please follow up with your primary care physician in one week regarding your hospitalization, bring copies of your discharge paperwork.  Please follow up with your surgeon, Dr. Petit in 2 weeks

## 2024-10-25 NOTE — ASU DISCHARGE PLAN (ADULT/PEDIATRIC) - NS MD DC FALL RISK RISK
For information on Fall & Injury Prevention, visit: [email or web address] OR  [email or web address] OR  https://www.cdc.gov/steadi/patient.html

## 2024-10-25 NOTE — ASU DISCHARGE PLAN (ADULT/PEDIATRIC) - CARE PROVIDER_API CALL
Blas Petit  Surgery  [address] [place] [id number]  Phone: [phone]  Fax: [phone]  Follow Up Time: 2 weeks

## 2024-10-25 NOTE — ASU DISCHARGE PLAN (ADULT/PEDIATRIC) - FINANCIAL ASSISTANCE
[facility] provides services at a reduced cost to those who are determined to be eligible through [facility]’s financial assistance program. Information regarding [facility]’s financial assistance program can be found by going to [email or web address] or by calling [phone].

## 2024-10-26 VITALS
OXYGEN SATURATION: 97 % | RESPIRATION RATE: 12 BRPM | DIASTOLIC BLOOD PRESSURE: 78 MMHG | HEART RATE: 80 BPM | SYSTOLIC BLOOD PRESSURE: 147 MMHG

## 2024-10-28 PROBLEM — Z48.89 POSTOPERATIVE VISIT: Status: ACTIVE | Noted: 2024-10-28

## 2024-10-29 ENCOUNTER — APPOINTMENT (OUTPATIENT)
Dept: GYNECOLOGIC ONCOLOGY | Facility: CLINIC | Age: 44
End: 2024-10-29
Payer: MEDICAID

## 2024-10-29 VITALS
SYSTOLIC BLOOD PRESSURE: 176 MMHG | BODY MASS INDEX: 41.94 KG/M2 | HEART RATE: 96 BPM | TEMPERATURE: 97.8 F | WEIGHT: 213.6 LBS | OXYGEN SATURATION: 99 % | DIASTOLIC BLOOD PRESSURE: 93 MMHG | HEIGHT: 60 IN

## 2024-10-29 DIAGNOSIS — Z23 ENCOUNTER FOR IMMUNIZATION: ICD-10-CM

## 2024-10-29 DIAGNOSIS — C54.1 MALIGNANT NEOPLASM OF ENDOMETRIUM: ICD-10-CM

## 2024-10-29 DIAGNOSIS — Z00.00 ENCOUNTER FOR GENERAL ADULT MEDICAL EXAMINATION WITHOUT ABNORMAL FINDINGS: ICD-10-CM

## 2024-10-29 DIAGNOSIS — Z83.3 FAMILY HISTORY OF DIABETES MELLITUS: ICD-10-CM

## 2024-10-29 DIAGNOSIS — R16.0 HEPATOMEGALY, NOT ELSEWHERE CLASSIFIED: ICD-10-CM

## 2024-10-29 DIAGNOSIS — Z48.89 ENCOUNTER FOR OTHER SPECIFIED SURGICAL AFTERCARE: ICD-10-CM

## 2024-10-29 DIAGNOSIS — R73.03 PREDIABETES: ICD-10-CM

## 2024-10-29 DIAGNOSIS — E66.01 MORBID (SEVERE) OBESITY DUE TO EXCESS CALORIES: ICD-10-CM

## 2024-10-29 LAB
AFP-TM SERPL-MCNC: 2.6 NG/ML
ALBUMIN SERPL ELPH-MCNC: 4 G/DL
ALP BLD-CCNC: 100 U/L
ALT SERPL-CCNC: 18 U/L
ANION GAP SERPL CALC-SCNC: 9 MMOL/L
AST SERPL-CCNC: 26 U/L
BILIRUB SERPL-MCNC: 0.3 MG/DL
BUN SERPL-MCNC: 12 MG/DL
CALCIUM SERPL-MCNC: 9 MG/DL
CEA SERPL-MCNC: 3.2 NG/ML
CHLORIDE SERPL-SCNC: 102 MMOL/L
CO2 SERPL-SCNC: 28 MMOL/L
CREAT SERPL-MCNC: 0.68 MG/DL
EGFR: 109 ML/MIN/1.73M2
GLUCOSE SERPL-MCNC: 119 MG/DL
HCT VFR BLD CALC: 37.1 %
HGB BLD-MCNC: 11.7 G/DL
INR PPP: 1.01 RATIO
MCHC RBC-ENTMCNC: 27.9 PG
MCHC RBC-ENTMCNC: 31.5 GM/DL
MCV RBC AUTO: 88.5 FL
PLATELET # BLD AUTO: 332 K/UL
PMV BLD AUTO: 0 /100 WBCS
POTASSIUM SERPL-SCNC: 3.9 MMOL/L
PROT SERPL-MCNC: 7.3 G/DL
PT BLD: 11.5 SEC
RBC # BLD: 4.19 M/UL
RBC # FLD: 18.5 %
SODIUM SERPL-SCNC: 139 MMOL/L
WBC # FLD AUTO: 5.15 K/UL

## 2024-10-29 PROCEDURE — 99024 POSTOP FOLLOW-UP VISIT: CPT

## 2024-10-30 LAB
ALPHA-1-FETOPROTEIN-L3: NORMAL %
ALPHA-1-FETOPROTEIN: 3.1 NG/ML

## 2024-11-04 LAB — SURGICAL PATHOLOGY STUDY: SIGNIFICANT CHANGE UP

## 2024-11-05 ENCOUNTER — APPOINTMENT (OUTPATIENT)
Dept: INTERNAL MEDICINE | Facility: CLINIC | Age: 44
End: 2024-11-05

## 2024-11-06 ENCOUNTER — NON-APPOINTMENT (OUTPATIENT)
Age: 44
End: 2024-11-06

## 2024-11-07 ENCOUNTER — APPOINTMENT (OUTPATIENT)
Dept: MRI IMAGING | Facility: CLINIC | Age: 44
End: 2024-11-07

## 2024-11-07 ENCOUNTER — APPOINTMENT (OUTPATIENT)
Dept: CT IMAGING | Facility: CLINIC | Age: 44
End: 2024-11-07

## 2024-11-12 ENCOUNTER — APPOINTMENT (OUTPATIENT)
Dept: GYNECOLOGIC ONCOLOGY | Facility: CLINIC | Age: 44
End: 2024-11-12

## 2024-11-13 ENCOUNTER — NON-APPOINTMENT (OUTPATIENT)
Age: 44
End: 2024-11-13

## 2024-11-18 ENCOUNTER — APPOINTMENT (OUTPATIENT)
Dept: MAMMOGRAPHY | Facility: CLINIC | Age: 44
End: 2024-11-18
Payer: MEDICAID

## 2024-11-18 ENCOUNTER — RESULT REVIEW (OUTPATIENT)
Age: 44
End: 2024-11-18

## 2024-11-18 ENCOUNTER — APPOINTMENT (OUTPATIENT)
Dept: ULTRASOUND IMAGING | Facility: CLINIC | Age: 44
End: 2024-11-18
Payer: MEDICAID

## 2024-11-18 PROCEDURE — 77067 SCR MAMMO BI INCL CAD: CPT

## 2024-11-18 PROCEDURE — 76641 ULTRASOUND BREAST COMPLETE: CPT | Mod: 50

## 2024-11-18 PROCEDURE — 77063 BREAST TOMOSYNTHESIS BI: CPT

## 2024-12-04 ENCOUNTER — APPOINTMENT (OUTPATIENT)
Dept: RADIOLOGY | Facility: IMAGING CENTER | Age: 44
End: 2024-12-04
Payer: MEDICAID

## 2024-12-04 ENCOUNTER — OUTPATIENT (OUTPATIENT)
Dept: OUTPATIENT SERVICES | Facility: HOSPITAL | Age: 44
LOS: 1 days | End: 2024-12-04
Payer: MEDICAID

## 2024-12-04 ENCOUNTER — APPOINTMENT (OUTPATIENT)
Dept: MRI IMAGING | Facility: IMAGING CENTER | Age: 44
End: 2024-12-04
Payer: MEDICAID

## 2024-12-04 ENCOUNTER — APPOINTMENT (OUTPATIENT)
Dept: CT IMAGING | Facility: IMAGING CENTER | Age: 44
End: 2024-12-04
Payer: MEDICAID

## 2024-12-04 DIAGNOSIS — R16.0 HEPATOMEGALY, NOT ELSEWHERE CLASSIFIED: ICD-10-CM

## 2024-12-04 DIAGNOSIS — Z98.890 OTHER SPECIFIED POSTPROCEDURAL STATES: Chronic | ICD-10-CM

## 2024-12-04 DIAGNOSIS — Z92.89 PERSONAL HISTORY OF OTHER MEDICAL TREATMENT: Chronic | ICD-10-CM

## 2024-12-04 PROCEDURE — 74177 CT ABD & PELVIS W/CONTRAST: CPT | Mod: 26

## 2024-12-04 PROCEDURE — 74183 MRI ABD W/O CNTR FLWD CNTR: CPT | Mod: 26

## 2024-12-04 PROCEDURE — 74177 CT ABD & PELVIS W/CONTRAST: CPT

## 2024-12-04 PROCEDURE — 74183 MRI ABD W/O CNTR FLWD CNTR: CPT

## 2024-12-04 PROCEDURE — A9581: CPT

## 2025-01-21 ENCOUNTER — APPOINTMENT (OUTPATIENT)
Dept: GASTROENTEROLOGY | Facility: CLINIC | Age: 45
End: 2025-01-21

## 2025-01-28 ENCOUNTER — NON-APPOINTMENT (OUTPATIENT)
Age: 45
End: 2025-01-28

## 2025-01-28 ENCOUNTER — APPOINTMENT (OUTPATIENT)
Dept: GYNECOLOGIC ONCOLOGY | Facility: CLINIC | Age: 45
End: 2025-01-28
Payer: MEDICAID

## 2025-01-28 VITALS
TEMPERATURE: 97.8 F | DIASTOLIC BLOOD PRESSURE: 112 MMHG | HEIGHT: 60 IN | WEIGHT: 224 LBS | BODY MASS INDEX: 43.98 KG/M2 | SYSTOLIC BLOOD PRESSURE: 198 MMHG | OXYGEN SATURATION: 99 % | RESPIRATION RATE: 16 BRPM | HEART RATE: 87 BPM

## 2025-01-28 PROCEDURE — 99459 PELVIC EXAMINATION: CPT

## 2025-01-28 PROCEDURE — G2211 COMPLEX E/M VISIT ADD ON: CPT | Mod: NC

## 2025-01-28 PROCEDURE — 99214 OFFICE O/P EST MOD 30 MIN: CPT

## 2025-01-31 ENCOUNTER — OUTPATIENT (OUTPATIENT)
Dept: OUTPATIENT SERVICES | Facility: HOSPITAL | Age: 45
LOS: 1 days | End: 2025-01-31

## 2025-01-31 ENCOUNTER — APPOINTMENT (OUTPATIENT)
Dept: INTERNAL MEDICINE | Facility: CLINIC | Age: 45
End: 2025-01-31

## 2025-01-31 VITALS
DIASTOLIC BLOOD PRESSURE: 96 MMHG | HEIGHT: 60 IN | SYSTOLIC BLOOD PRESSURE: 180 MMHG | BODY MASS INDEX: 43.39 KG/M2 | OXYGEN SATURATION: 98 % | WEIGHT: 221 LBS | HEART RATE: 95 BPM

## 2025-01-31 VITALS — DIASTOLIC BLOOD PRESSURE: 84 MMHG | SYSTOLIC BLOOD PRESSURE: 162 MMHG

## 2025-01-31 DIAGNOSIS — Z92.89 PERSONAL HISTORY OF OTHER MEDICAL TREATMENT: Chronic | ICD-10-CM

## 2025-01-31 DIAGNOSIS — Z98.890 OTHER SPECIFIED POSTPROCEDURAL STATES: Chronic | ICD-10-CM

## 2025-01-31 DIAGNOSIS — I10 ESSENTIAL (PRIMARY) HYPERTENSION: ICD-10-CM

## 2025-01-31 DIAGNOSIS — C54.1 MALIGNANT NEOPLASM OF ENDOMETRIUM: ICD-10-CM

## 2025-01-31 DIAGNOSIS — K76.9 LIVER DISEASE, UNSPECIFIED: ICD-10-CM

## 2025-01-31 PROCEDURE — 99214 OFFICE O/P EST MOD 30 MIN: CPT | Mod: GC

## 2025-01-31 RX ORDER — AMLODIPINE AND OLMESARTAN MEDOXOMIL 10; 20 MG/1; MG/1
10-20 TABLET ORAL DAILY
Qty: 90 | Refills: 3 | Status: ACTIVE | COMMUNITY
Start: 2025-01-31 | End: 1900-01-01

## 2025-02-05 ENCOUNTER — APPOINTMENT (OUTPATIENT)
Dept: INTERNAL MEDICINE | Facility: CLINIC | Age: 45
End: 2025-02-05

## 2025-02-12 ENCOUNTER — APPOINTMENT (OUTPATIENT)
Dept: INTERNAL MEDICINE | Facility: CLINIC | Age: 45
End: 2025-02-12
Payer: MEDICAID

## 2025-02-12 DIAGNOSIS — I10 ESSENTIAL (PRIMARY) HYPERTENSION: ICD-10-CM

## 2025-02-12 DIAGNOSIS — C54.1 MALIGNANT NEOPLASM OF ENDOMETRIUM: ICD-10-CM

## 2025-02-12 PROCEDURE — 99213 OFFICE O/P EST LOW 20 MIN: CPT | Mod: 95

## 2025-02-14 ENCOUNTER — NON-APPOINTMENT (OUTPATIENT)
Age: 45
End: 2025-02-14

## 2025-03-18 ENCOUNTER — NON-APPOINTMENT (OUTPATIENT)
Age: 45
End: 2025-03-18

## 2025-03-18 ENCOUNTER — APPOINTMENT (OUTPATIENT)
Dept: SURGICAL ONCOLOGY | Facility: CLINIC | Age: 45
End: 2025-03-18
Payer: MEDICAID

## 2025-03-18 VITALS
RESPIRATION RATE: 17 BRPM | SYSTOLIC BLOOD PRESSURE: 142 MMHG | OXYGEN SATURATION: 98 % | DIASTOLIC BLOOD PRESSURE: 80 MMHG | BODY MASS INDEX: 43.98 KG/M2 | HEIGHT: 60 IN | HEART RATE: 101 BPM | WEIGHT: 224 LBS

## 2025-03-18 DIAGNOSIS — C22.1 INTRAHEPATIC BILE DUCT CARCINOMA: ICD-10-CM

## 2025-03-18 DIAGNOSIS — C54.1 MALIGNANT NEOPLASM OF ENDOMETRIUM: ICD-10-CM

## 2025-03-18 PROCEDURE — 99214 OFFICE O/P EST MOD 30 MIN: CPT

## 2025-03-26 ENCOUNTER — OUTPATIENT (OUTPATIENT)
Dept: OUTPATIENT SERVICES | Facility: HOSPITAL | Age: 45
LOS: 1 days | End: 2025-03-26
Payer: MEDICAID

## 2025-03-26 ENCOUNTER — APPOINTMENT (OUTPATIENT)
Dept: MRI IMAGING | Facility: CLINIC | Age: 45
End: 2025-03-26
Payer: MEDICAID

## 2025-03-26 DIAGNOSIS — C22.1 INTRAHEPATIC BILE DUCT CARCINOMA: ICD-10-CM

## 2025-03-26 DIAGNOSIS — Z92.89 PERSONAL HISTORY OF OTHER MEDICAL TREATMENT: Chronic | ICD-10-CM

## 2025-03-26 DIAGNOSIS — Z98.890 OTHER SPECIFIED POSTPROCEDURAL STATES: Chronic | ICD-10-CM

## 2025-03-26 PROCEDURE — 74183 MRI ABD W/O CNTR FLWD CNTR: CPT | Mod: 26

## 2025-03-26 PROCEDURE — 74183 MRI ABD W/O CNTR FLWD CNTR: CPT

## 2025-03-26 PROCEDURE — A9581: CPT

## 2025-04-03 ENCOUNTER — APPOINTMENT (OUTPATIENT)
Dept: SURGICAL ONCOLOGY | Facility: CLINIC | Age: 45
End: 2025-04-03

## 2025-04-09 ENCOUNTER — OUTPATIENT (OUTPATIENT)
Dept: OUTPATIENT SERVICES | Facility: HOSPITAL | Age: 45
LOS: 1 days | End: 2025-04-09
Payer: MEDICAID

## 2025-04-09 ENCOUNTER — APPOINTMENT (OUTPATIENT)
Dept: CT IMAGING | Facility: CLINIC | Age: 45
End: 2025-04-09
Payer: MEDICAID

## 2025-04-09 DIAGNOSIS — C22.1 INTRAHEPATIC BILE DUCT CARCINOMA: ICD-10-CM

## 2025-04-09 PROCEDURE — 74174 CTA ABD&PLVS W/CONTRAST: CPT

## 2025-04-09 PROCEDURE — 74174 CTA ABD&PLVS W/CONTRAST: CPT | Mod: 26

## 2025-04-17 ENCOUNTER — APPOINTMENT (OUTPATIENT)
Dept: SURGICAL ONCOLOGY | Facility: CLINIC | Age: 45
End: 2025-04-17
Payer: MEDICAID

## 2025-04-17 VITALS
HEART RATE: 90 BPM | DIASTOLIC BLOOD PRESSURE: 80 MMHG | BODY MASS INDEX: 43.98 KG/M2 | SYSTOLIC BLOOD PRESSURE: 132 MMHG | OXYGEN SATURATION: 98 % | HEIGHT: 60 IN | WEIGHT: 224 LBS

## 2025-04-17 PROCEDURE — 99214 OFFICE O/P EST MOD 30 MIN: CPT

## 2025-04-18 ENCOUNTER — NON-APPOINTMENT (OUTPATIENT)
Age: 45
End: 2025-04-18

## 2025-04-23 ENCOUNTER — APPOINTMENT (OUTPATIENT)
Dept: GYNECOLOGIC ONCOLOGY | Facility: CLINIC | Age: 45
End: 2025-04-23

## 2025-04-23 ENCOUNTER — OUTPATIENT (OUTPATIENT)
Dept: OUTPATIENT SERVICES | Facility: HOSPITAL | Age: 45
LOS: 1 days | Discharge: HOME | End: 2025-04-23

## 2025-04-23 DIAGNOSIS — C22.1 INTRAHEPATIC BILE DUCT CARCINOMA: ICD-10-CM

## 2025-04-23 DIAGNOSIS — Z98.890 OTHER SPECIFIED POSTPROCEDURAL STATES: Chronic | ICD-10-CM

## 2025-04-23 DIAGNOSIS — Z92.89 PERSONAL HISTORY OF OTHER MEDICAL TREATMENT: Chronic | ICD-10-CM

## 2025-04-24 ENCOUNTER — APPOINTMENT (OUTPATIENT)
Dept: HEMATOLOGY ONCOLOGY | Facility: CLINIC | Age: 45
End: 2025-04-24

## 2025-04-24 ENCOUNTER — APPOINTMENT (OUTPATIENT)
Dept: SURGICAL ONCOLOGY | Facility: CLINIC | Age: 45
End: 2025-04-24
Payer: MEDICAID

## 2025-04-24 ENCOUNTER — RESULT REVIEW (OUTPATIENT)
Age: 45
End: 2025-04-24

## 2025-04-24 ENCOUNTER — LABORATORY RESULT (OUTPATIENT)
Age: 45
End: 2025-04-24

## 2025-04-24 VITALS
HEIGHT: 59.69 IN | DIASTOLIC BLOOD PRESSURE: 79 MMHG | BODY MASS INDEX: 44.37 KG/M2 | OXYGEN SATURATION: 99 % | HEART RATE: 85 BPM | TEMPERATURE: 98.2 F | SYSTOLIC BLOOD PRESSURE: 127 MMHG | WEIGHT: 226 LBS | RESPIRATION RATE: 17 BRPM

## 2025-04-24 VITALS
OXYGEN SATURATION: 99 % | SYSTOLIC BLOOD PRESSURE: 170 MMHG | DIASTOLIC BLOOD PRESSURE: 90 MMHG | BODY MASS INDEX: 44.37 KG/M2 | HEIGHT: 60 IN | HEART RATE: 96 BPM | WEIGHT: 226 LBS

## 2025-04-24 DIAGNOSIS — C22.1 INTRAHEPATIC BILE DUCT CARCINOMA: ICD-10-CM

## 2025-04-24 DIAGNOSIS — C54.1 MALIGNANT NEOPLASM OF ENDOMETRIUM: ICD-10-CM

## 2025-04-24 LAB
BASOPHILS # BLD AUTO: 0.03 K/UL — SIGNIFICANT CHANGE UP (ref 0–0.2)
BASOPHILS NFR BLD AUTO: 0.6 % — SIGNIFICANT CHANGE UP (ref 0–2)
EOSINOPHIL # BLD AUTO: 0.2 K/UL — SIGNIFICANT CHANGE UP (ref 0–0.5)
EOSINOPHIL NFR BLD AUTO: 4 % — SIGNIFICANT CHANGE UP (ref 0–6)
HCT VFR BLD CALC: 35 % — SIGNIFICANT CHANGE UP (ref 34.5–45)
HGB BLD-MCNC: 12.1 G/DL — SIGNIFICANT CHANGE UP (ref 11.5–15.5)
IMM GRANULOCYTES NFR BLD AUTO: 0.2 % — SIGNIFICANT CHANGE UP (ref 0–0.9)
LYMPHOCYTES # BLD AUTO: 1.39 K/UL — SIGNIFICANT CHANGE UP (ref 1–3.3)
LYMPHOCYTES # BLD AUTO: 28 % — SIGNIFICANT CHANGE UP (ref 13–44)
MCHC RBC-ENTMCNC: 32 PG — SIGNIFICANT CHANGE UP (ref 27–34)
MCHC RBC-ENTMCNC: 34.6 G/DL — SIGNIFICANT CHANGE UP (ref 32–36)
MCV RBC AUTO: 92.6 FL — SIGNIFICANT CHANGE UP (ref 80–100)
MONOCYTES # BLD AUTO: 0.51 K/UL — SIGNIFICANT CHANGE UP (ref 0–0.9)
MONOCYTES NFR BLD AUTO: 10.3 % — SIGNIFICANT CHANGE UP (ref 2–14)
NEUTROPHILS # BLD AUTO: 2.83 K/UL — SIGNIFICANT CHANGE UP (ref 1.8–7.4)
NEUTROPHILS NFR BLD AUTO: 56.9 % — SIGNIFICANT CHANGE UP (ref 43–77)
NRBC BLD AUTO-RTO: 0 /100 WBCS — SIGNIFICANT CHANGE UP (ref 0–0)
PLATELET # BLD AUTO: 193 K/UL — SIGNIFICANT CHANGE UP (ref 150–400)
RBC # BLD: 3.78 M/UL — LOW (ref 3.8–5.2)
RBC # FLD: 13.2 % — SIGNIFICANT CHANGE UP (ref 10.3–14.5)
WBC # BLD: 4.97 K/UL — SIGNIFICANT CHANGE UP (ref 3.8–10.5)
WBC # FLD AUTO: 4.97 K/UL — SIGNIFICANT CHANGE UP (ref 3.8–10.5)

## 2025-04-24 PROCEDURE — 99417 PROLNG OP E/M EACH 15 MIN: CPT

## 2025-04-24 PROCEDURE — 99205 OFFICE O/P NEW HI 60 MIN: CPT

## 2025-04-25 LAB
ALBUMIN SERPL ELPH-MCNC: 4.1 G/DL
ALP BLD-CCNC: 83 U/L
ALT SERPL-CCNC: 38 U/L
ANION GAP SERPL CALC-SCNC: 13 MMOL/L
APTT BLD: 30.4 SEC
AST SERPL-CCNC: 52 U/L
BILIRUB SERPL-MCNC: 0.4 MG/DL
BUN SERPL-MCNC: 16 MG/DL
CALCIUM SERPL-MCNC: 9.3 MG/DL
CHLORIDE SERPL-SCNC: 102 MMOL/L
CO2 SERPL-SCNC: 24 MMOL/L
CREAT SERPL-MCNC: 0.74 MG/DL
EGFRCR SERPLBLD CKD-EPI 2021: 101 ML/MIN/1.73M2
GLUCOSE SERPL-MCNC: 187 MG/DL
HBV CORE IGG+IGM SER QL: NONREACTIVE
HBV CORE IGM SER QL: NONREACTIVE
HBV SURFACE AB SER QL: NONREACTIVE
HBV SURFACE AG SER QL: NONREACTIVE
HCV AB SER QL: NONREACTIVE
HCV S/CO RATIO: 0.16 S/CO
INR PPP: 1.03 RATIO
POTASSIUM SERPL-SCNC: 4.4 MMOL/L
PROT SERPL-MCNC: 7.6 G/DL
PT BLD: 12.2 SEC
SODIUM SERPL-SCNC: 140 MMOL/L
TSH SERPL-ACNC: 4.44 UIU/ML

## 2025-04-28 ENCOUNTER — APPOINTMENT (OUTPATIENT)
Dept: CARDIOLOGY | Facility: CLINIC | Age: 45
End: 2025-04-28
Payer: MEDICAID

## 2025-04-28 ENCOUNTER — NON-APPOINTMENT (OUTPATIENT)
Age: 45
End: 2025-04-28

## 2025-04-28 VITALS
WEIGHT: 226 LBS | BODY MASS INDEX: 44.37 KG/M2 | HEIGHT: 60 IN | TEMPERATURE: 98.1 F | DIASTOLIC BLOOD PRESSURE: 83 MMHG | HEART RATE: 94 BPM | SYSTOLIC BLOOD PRESSURE: 144 MMHG | RESPIRATION RATE: 17 BRPM | OXYGEN SATURATION: 100 %

## 2025-04-28 VITALS — DIASTOLIC BLOOD PRESSURE: 74 MMHG | SYSTOLIC BLOOD PRESSURE: 128 MMHG

## 2025-04-28 DIAGNOSIS — R06.02 SHORTNESS OF BREATH: ICD-10-CM

## 2025-04-28 PROCEDURE — 99204 OFFICE O/P NEW MOD 45 MIN: CPT | Mod: 25

## 2025-04-28 PROCEDURE — 93306 TTE W/DOPPLER COMPLETE: CPT

## 2025-04-28 PROCEDURE — 93000 ELECTROCARDIOGRAM COMPLETE: CPT

## 2025-04-30 ENCOUNTER — APPOINTMENT (OUTPATIENT)
Dept: CARDIOLOGY | Facility: CLINIC | Age: 45
End: 2025-04-30

## 2025-04-30 ENCOUNTER — OUTPATIENT (OUTPATIENT)
Dept: OUTPATIENT SERVICES | Facility: HOSPITAL | Age: 45
LOS: 1 days | End: 2025-04-30
Payer: MEDICAID

## 2025-04-30 ENCOUNTER — APPOINTMENT (OUTPATIENT)
Dept: INTERNAL MEDICINE | Facility: CLINIC | Age: 45
End: 2025-04-30
Payer: MEDICAID

## 2025-04-30 VITALS — SYSTOLIC BLOOD PRESSURE: 136 MMHG | DIASTOLIC BLOOD PRESSURE: 78 MMHG

## 2025-04-30 VITALS
DIASTOLIC BLOOD PRESSURE: 80 MMHG | HEART RATE: 97 BPM | WEIGHT: 228 LBS | OXYGEN SATURATION: 97 % | SYSTOLIC BLOOD PRESSURE: 146 MMHG | BODY MASS INDEX: 44.53 KG/M2

## 2025-04-30 DIAGNOSIS — Z92.89 PERSONAL HISTORY OF OTHER MEDICAL TREATMENT: Chronic | ICD-10-CM

## 2025-04-30 DIAGNOSIS — I10 ESSENTIAL (PRIMARY) HYPERTENSION: ICD-10-CM

## 2025-04-30 DIAGNOSIS — Z01.818 ENCOUNTER FOR OTHER PREPROCEDURAL EXAMINATION: ICD-10-CM

## 2025-04-30 DIAGNOSIS — Z98.890 OTHER SPECIFIED POSTPROCEDURAL STATES: Chronic | ICD-10-CM

## 2025-04-30 PROCEDURE — G0463: CPT

## 2025-04-30 PROCEDURE — 99214 OFFICE O/P EST MOD 30 MIN: CPT

## 2025-04-30 PROCEDURE — G2211 COMPLEX E/M VISIT ADD ON: CPT | Mod: NC

## 2025-05-01 ENCOUNTER — OUTPATIENT (OUTPATIENT)
Dept: OUTPATIENT SERVICES | Facility: HOSPITAL | Age: 45
LOS: 1 days | End: 2025-05-01

## 2025-05-01 VITALS
DIASTOLIC BLOOD PRESSURE: 81 MMHG | HEIGHT: 60 IN | WEIGHT: 227.96 LBS | HEART RATE: 94 BPM | OXYGEN SATURATION: 98 % | SYSTOLIC BLOOD PRESSURE: 129 MMHG | TEMPERATURE: 98 F | RESPIRATION RATE: 16 BRPM

## 2025-05-01 DIAGNOSIS — C22.1 INTRAHEPATIC BILE DUCT CARCINOMA: ICD-10-CM

## 2025-05-01 DIAGNOSIS — Z98.890 OTHER SPECIFIED POSTPROCEDURAL STATES: Chronic | ICD-10-CM

## 2025-05-01 DIAGNOSIS — Z92.89 PERSONAL HISTORY OF OTHER MEDICAL TREATMENT: Chronic | ICD-10-CM

## 2025-05-01 DIAGNOSIS — I10 ESSENTIAL (PRIMARY) HYPERTENSION: ICD-10-CM

## 2025-05-01 DIAGNOSIS — Z90.710 ACQUIRED ABSENCE OF BOTH CERVIX AND UTERUS: Chronic | ICD-10-CM

## 2025-05-01 LAB
A1C WITH ESTIMATED AVERAGE GLUCOSE RESULT: 8.7 % — HIGH (ref 4–5.6)
BLD GP AB SCN SERPL QL: NEGATIVE — SIGNIFICANT CHANGE UP
ESTIMATED AVERAGE GLUCOSE: 203 — SIGNIFICANT CHANGE UP
RH IG SCN BLD-IMP: POSITIVE — SIGNIFICANT CHANGE UP

## 2025-05-01 NOTE — H&P PST ADULT - HISTORY OF PRESENT ILLNESS
46 y/o female PMH HTN morbid obesity presents to presurgical testing with diagnosis of intrahepatic bile duct carcinoma. Patient initially presented to [facility] in early September 2024 w/ complaints of SOB/generalized weakness, and was found to have a Hgb of 3.7 and received 4u of PRBC. CT angio CAP 09/09/2024 demonstrated incidental findings of a mass in the left hepatic lobe. Also, patient with endometrial thickening s/p biopsy revealing endometrial cancer s/p hysterectomy 10/2024. Pt completed chemo 3/2025. Pt is scheduled for a hepatic artery infusion pump placement cholecystectomy intra op ultrasound portal lymphadenectomy and liver biopsy.       .  ?

## 2025-05-01 NOTE — H&P PST ADULT - PROBLEM SELECTOR PROBLEM 2
Og Adair  1949 01/24/24    SUBJECTIVE:    Atr fib, sporadic rare episodes of tachypalpitations x2 this mo, for a few min, 5min up to 130s.  Denies sx cp    Has some mild ENRIQUE, no wheezing or coughing.  Hx of asthma and copd is noted.  Hx of cardiomyopathy also noted.  Echo 8/23 was EF 45-50%    Prostate CA, is stable now after completing his injections, cont f/u w PSA w oncology Dr Farmer    Dm- ins not cover ozempic but is on jardiance which also has cardiac benefits.    Lab Results   Component Value Date    LABA1C 5.4 01/15/2024    LABA1C 5.5 04/17/2023    LABA1C 5.0 11/21/2022     Lab Results   Component Value Date    GLUF 107 (H) 01/06/2017    MALBCR see below 04/17/2023    LDLCALC 58 01/15/2024    CREATININE 1.1 01/15/2024       OBJECTIVE:    /70   Pulse 75   Resp 16   Ht 1.956 m (6' 5\")   Wt 118.4 kg (261 lb)   SpO2 94%   BMI 30.95 kg/m²     Physical Exam  Constitutional:       Appearance: Normal appearance.   Cardiovascular:      Rate and Rhythm: Normal rate. Rhythm irregular.      Heart sounds: No murmur heard.     No gallop.   Pulmonary:      Effort: No respiratory distress.      Breath sounds: No wheezing.   Abdominal:      General: Abdomen is flat. Bowel sounds are normal. There is no distension.      Palpations: Abdomen is soft. There is no mass.      Tenderness: There is no abdominal tenderness.      Hernia: No hernia is present.   Musculoskeletal:      Right lower leg: Edema (tr) present.      Left lower leg: Edema (tr) present.   Neurological:      Mental Status: He is alert.   Psychiatric:         Mood and Affect: Mood normal.         ASSESSMENT:    1. Dyspnea, unspecified type    2. Dilated cardiomyopathy (HCC)    3. Persistent atrial fibrillation (HCC)    4. Mild intermittent asthma without complication    5. Panlobular emphysema (HCC)    6. Controlled type 2 diabetes mellitus with diabetic nephropathy, without long-term current use of insulin (HCC)    7. Adenocarcinoma of  HTN (hypertension)

## 2025-05-01 NOTE — H&P PST ADULT - LAST STRESS TEST
scheduled for 5/2/25 at [facility] - patient was referred for a cardio evaluation by PCP due to GALVEZ

## 2025-05-01 NOTE — H&P PST ADULT - NSICDXPASTSURGICALHX_GEN_ALL_CORE_FT
PAST SURGICAL HISTORY:  History of endometrial biopsy     History of hysterectomy     History of liver biopsy     History of vascular access device

## 2025-05-01 NOTE — H&P PST ADULT - NSICDXPASTMEDICALHX_GEN_ALL_CORE_FT
PAST MEDICAL HISTORY:  History of chemotherapy     Intrahepatic bile duct carcinoma     Malignant neoplasm of endometrium     Morbid obesity

## 2025-05-01 NOTE — H&P PST ADULT - LAST ECHOCARDIOGRAM
4/28/25 EF 55-60%  Severe concentric left ventricular hypertrophy Mild mitral and aortic regurgitation

## 2025-05-01 NOTE — H&P PST ADULT - CARDIOVASCULAR COMMENTS
recently evaluated by cardiologist, referred by PCP dyspnea with exertion after 2 flights of stairs - since last year.  Pt states it is stable.  Pt completed an echo and is scheduled for a stress test tomorrow at [facility]

## 2025-05-01 NOTE — H&P PST ADULT - NECK
other (specify)/well nourished Ht: 68 inches, Wt: 164.5lbs, BMI: 25.1kg/m2, IBW: 154lbs +/- 10%, %IBW: 107lbs%  Edema: none, Skin: free of pressure injuries per nursing flow sheets    Unable to conduct nutrition-focused physical exam due to limited contact restrictions related to current Covid pandemic. No signs of depletion upon visual assessment. supple

## 2025-05-01 NOTE — H&P PST ADULT - PROBLEM SELECTOR PLAN 1
Patient tentatively scheduled for hepatic artery infusion pump placement cholecystectomy intra op ultrasound portal lymphadenectomy and liver biopsy for 5/7/25. Pre-op instructions provided. Pt given verbal and written instructions with teach back on chlorhexidine shampoo and pepcid. Pt verbalized understanding with return demonstration.     Cardiac evaluation 4/28/25 in [facility].  Pending stress test results. - scheduled for 5/2/25 at [facility]    Labs done by PCP - 4/22/25 in [facility] - CBC CMP COAGS - elevated glucose 187 - PCP requesting A1C to be done at PST.  prior A1C in 10/2024 was 5.1.    [facility] CBC T&S A1C done

## 2025-05-02 ENCOUNTER — OUTPATIENT (OUTPATIENT)
Dept: OUTPATIENT SERVICES | Facility: HOSPITAL | Age: 45
LOS: 1 days | End: 2025-05-02
Payer: MEDICAID

## 2025-05-02 ENCOUNTER — APPOINTMENT (OUTPATIENT)
Dept: CV DIAGNOSTICS | Facility: HOSPITAL | Age: 45
End: 2025-05-02

## 2025-05-02 ENCOUNTER — APPOINTMENT (OUTPATIENT)
Dept: CT IMAGING | Facility: CLINIC | Age: 45
End: 2025-05-02

## 2025-05-02 DIAGNOSIS — Z92.89 PERSONAL HISTORY OF OTHER MEDICAL TREATMENT: Chronic | ICD-10-CM

## 2025-05-02 DIAGNOSIS — Z98.890 OTHER SPECIFIED POSTPROCEDURAL STATES: Chronic | ICD-10-CM

## 2025-05-02 DIAGNOSIS — Z90.710 ACQUIRED ABSENCE OF BOTH CERVIX AND UTERUS: Chronic | ICD-10-CM

## 2025-05-02 DIAGNOSIS — C22.1 INTRAHEPATIC BILE DUCT CARCINOMA: ICD-10-CM

## 2025-05-02 LAB
BASOPHILS # BLD AUTO: 0.03 K/UL — SIGNIFICANT CHANGE UP (ref 0–0.2)
BASOPHILS NFR BLD AUTO: 0.6 % — SIGNIFICANT CHANGE UP (ref 0–2)
EOSINOPHIL # BLD AUTO: 0.24 K/UL — SIGNIFICANT CHANGE UP (ref 0–0.5)
EOSINOPHIL NFR BLD AUTO: 4.5 % — SIGNIFICANT CHANGE UP (ref 0–6)
HCT VFR BLD CALC: 35.3 % — SIGNIFICANT CHANGE UP (ref 34.5–45)
HGB BLD-MCNC: 11.9 G/DL — SIGNIFICANT CHANGE UP (ref 11.5–15.5)
IMM GRANULOCYTES NFR BLD AUTO: 0.2 % — SIGNIFICANT CHANGE UP (ref 0–0.9)
LYMPHOCYTES # BLD AUTO: 1.69 K/UL — SIGNIFICANT CHANGE UP (ref 1–3.3)
LYMPHOCYTES # BLD AUTO: 31.6 % — SIGNIFICANT CHANGE UP (ref 13–44)
MCHC RBC-ENTMCNC: 31.2 PG — SIGNIFICANT CHANGE UP (ref 27–34)
MCHC RBC-ENTMCNC: 33.7 G/DL — SIGNIFICANT CHANGE UP (ref 32–36)
MCV RBC AUTO: 92.4 FL — SIGNIFICANT CHANGE UP (ref 80–100)
MONOCYTES # BLD AUTO: 0.57 K/UL — SIGNIFICANT CHANGE UP (ref 0–0.9)
MONOCYTES NFR BLD AUTO: 10.7 % — SIGNIFICANT CHANGE UP (ref 2–14)
NEUTROPHILS # BLD AUTO: 2.81 K/UL — SIGNIFICANT CHANGE UP (ref 1.8–7.4)
NEUTROPHILS NFR BLD AUTO: 52.4 % — SIGNIFICANT CHANGE UP (ref 43–77)
PLATELET # BLD AUTO: 228 K/UL — SIGNIFICANT CHANGE UP (ref 150–400)
RBC # BLD: 3.82 M/UL — SIGNIFICANT CHANGE UP (ref 3.8–5.2)
RBC # FLD: 13.4 % — SIGNIFICANT CHANGE UP (ref 10.3–14.5)
WBC # BLD: 5.35 K/UL — SIGNIFICANT CHANGE UP (ref 3.8–10.5)
WBC # FLD AUTO: 5.35 K/UL — SIGNIFICANT CHANGE UP (ref 3.8–10.5)

## 2025-05-02 PROCEDURE — 71250 CT THORAX DX C-: CPT

## 2025-05-02 PROCEDURE — 71250 CT THORAX DX C-: CPT | Mod: 26

## 2025-05-05 DIAGNOSIS — Z01.818 ENCOUNTER FOR OTHER PREPROCEDURAL EXAMINATION: ICD-10-CM

## 2025-05-06 ENCOUNTER — NON-APPOINTMENT (OUTPATIENT)
Age: 45
End: 2025-05-06

## 2025-05-06 NOTE — ASU PATIENT PROFILE, ADULT - FALL HARM RISK - UNIVERSAL INTERVENTIONS
Bed in lowest position, wheels locked, appropriate side rails in place/Call bell, personal items and telephone in reach/Instruct patient to call for assistance before getting out of bed or chair/Non-slip footwear when patient is out of bed/[place] to call system/Physically safe environment - no spills, clutter or unnecessary equipment/Purposeful Proactive Rounding/Room/bathroom lighting operational, light cord in reach

## 2025-05-07 ENCOUNTER — APPOINTMENT (OUTPATIENT)
Dept: SURGICAL ONCOLOGY | Facility: HOSPITAL | Age: 45
End: 2025-05-07

## 2025-05-07 ENCOUNTER — INPATIENT (INPATIENT)
Facility: HOSPITAL | Age: 45
LOS: 3 days | Discharge: HOME | End: 2025-05-11
Attending: STUDENT IN AN ORGANIZED HEALTH CARE EDUCATION/TRAINING PROGRAM | Admitting: STUDENT IN AN ORGANIZED HEALTH CARE EDUCATION/TRAINING PROGRAM
Payer: MEDICAID

## 2025-05-07 VITALS
TEMPERATURE: 98 F | RESPIRATION RATE: 14 BRPM | HEIGHT: 60 IN | SYSTOLIC BLOOD PRESSURE: 148 MMHG | WEIGHT: 227.96 LBS | HEART RATE: 84 BPM | OXYGEN SATURATION: 100 % | DIASTOLIC BLOOD PRESSURE: 86 MMHG

## 2025-05-07 DIAGNOSIS — Z90.710 ACQUIRED ABSENCE OF BOTH CERVIX AND UTERUS: Chronic | ICD-10-CM

## 2025-05-07 DIAGNOSIS — Z98.890 OTHER SPECIFIED POSTPROCEDURAL STATES: Chronic | ICD-10-CM

## 2025-05-07 DIAGNOSIS — C22.1 INTRAHEPATIC BILE DUCT CARCINOMA: ICD-10-CM

## 2025-05-07 DIAGNOSIS — Z92.89 PERSONAL HISTORY OF OTHER MEDICAL TREATMENT: Chronic | ICD-10-CM

## 2025-05-07 LAB
ALBUMIN SERPL ELPH-MCNC: 3 G/DL — LOW (ref 3.3–5)
ALP SERPL-CCNC: 59 U/L — SIGNIFICANT CHANGE UP (ref 40–120)
ALT FLD-CCNC: 29 U/L — SIGNIFICANT CHANGE UP (ref 4–33)
ANION GAP SERPL CALC-SCNC: 12 MMOL/L — SIGNIFICANT CHANGE UP (ref 7–14)
AST SERPL-CCNC: 54 U/L — HIGH (ref 4–32)
BILIRUB DIRECT SERPL-MCNC: 0.4 MG/DL — HIGH (ref 0–0.3)
BILIRUB INDIRECT FLD-MCNC: 0.5 MG/DL — SIGNIFICANT CHANGE UP (ref 0–1)
BILIRUB SERPL-MCNC: 0.9 MG/DL — SIGNIFICANT CHANGE UP (ref 0.2–1.2)
BLOOD GAS ARTERIAL - LYTES,HGB,ICA,LACT RESULT: SIGNIFICANT CHANGE UP
BUN SERPL-MCNC: 21 MG/DL — SIGNIFICANT CHANGE UP (ref 7–23)
CALCIUM SERPL-MCNC: 8 MG/DL — LOW (ref 8.4–10.5)
CHLORIDE SERPL-SCNC: 103 MMOL/L — SIGNIFICANT CHANGE UP (ref 98–107)
CO2 SERPL-SCNC: 18 MMOL/L — LOW (ref 22–31)
CREAT SERPL-MCNC: 0.92 MG/DL — SIGNIFICANT CHANGE UP (ref 0.5–1.3)
EGFR: 78 ML/MIN/1.73M2 — SIGNIFICANT CHANGE UP
EGFR: 78 ML/MIN/1.73M2 — SIGNIFICANT CHANGE UP
GAS PNL BLDA: SIGNIFICANT CHANGE UP
GAS PNL BLDA: SIGNIFICANT CHANGE UP
GLUCOSE SERPL-MCNC: 256 MG/DL — HIGH (ref 70–99)
HCT VFR BLD CALC: 36 % — SIGNIFICANT CHANGE UP (ref 34.5–45)
HGB BLD-MCNC: 12.2 G/DL — SIGNIFICANT CHANGE UP (ref 11.5–15.5)
MAGNESIUM SERPL-MCNC: 1.4 MG/DL — LOW (ref 1.6–2.6)
MCHC RBC-ENTMCNC: 32.3 PG — SIGNIFICANT CHANGE UP (ref 27–34)
MCHC RBC-ENTMCNC: 33.9 G/DL — SIGNIFICANT CHANGE UP (ref 32–36)
MCV RBC AUTO: 95.2 FL — SIGNIFICANT CHANGE UP (ref 80–100)
NRBC # BLD AUTO: 0 K/UL — SIGNIFICANT CHANGE UP (ref 0–0)
NRBC # FLD: 0 K/UL — SIGNIFICANT CHANGE UP (ref 0–0)
NRBC BLD AUTO-RTO: 0 /100 WBCS — SIGNIFICANT CHANGE UP (ref 0–0)
PHOSPHATE SERPL-MCNC: 3.6 MG/DL — SIGNIFICANT CHANGE UP (ref 2.5–4.5)
PLATELET # BLD AUTO: 194 K/UL — SIGNIFICANT CHANGE UP (ref 150–400)
POTASSIUM SERPL-MCNC: 5.3 MMOL/L — SIGNIFICANT CHANGE UP (ref 3.5–5.3)
POTASSIUM SERPL-SCNC: 5.3 MMOL/L — SIGNIFICANT CHANGE UP (ref 3.5–5.3)
PROT SERPL-MCNC: 6 G/DL — SIGNIFICANT CHANGE UP (ref 6–8.3)
RBC # BLD: 3.78 M/UL — LOW (ref 3.8–5.2)
RBC # FLD: 12.9 % — SIGNIFICANT CHANGE UP (ref 10.3–14.5)
SODIUM SERPL-SCNC: 133 MMOL/L — LOW (ref 135–145)
WBC # BLD: 8.76 K/UL — SIGNIFICANT CHANGE UP (ref 3.8–10.5)
WBC # FLD AUTO: 8.76 K/UL — SIGNIFICANT CHANGE UP (ref 3.8–10.5)

## 2025-05-07 PROCEDURE — 47600 CHOLECYSTECTOMY: CPT

## 2025-05-07 PROCEDURE — 88304 TISSUE EXAM BY PATHOLOGIST: CPT | Mod: 26

## 2025-05-07 PROCEDURE — 88307 TISSUE EXAM BY PATHOLOGIST: CPT | Mod: 26

## 2025-05-07 PROCEDURE — 71045 X-RAY EXAM CHEST 1 VIEW: CPT | Mod: 26

## 2025-05-07 PROCEDURE — 49905 OMENTAL FLAP INTRA-ABDOM: CPT

## 2025-05-07 PROCEDURE — 37617 LIGATION MAJOR ARTERY ABD: CPT | Mod: 22

## 2025-05-07 PROCEDURE — 47120 PARTIAL REMOVAL OF LIVER: CPT

## 2025-05-07 PROCEDURE — 76998 US GUIDE INTRAOP: CPT | Mod: 26

## 2025-05-07 PROCEDURE — 36260 INSERTION OF INFUSION PUMP: CPT

## 2025-05-07 PROCEDURE — 47600 CHOLECYSTECTOMY: CPT | Mod: 82

## 2025-05-07 PROCEDURE — 88305 TISSUE EXAM BY PATHOLOGIST: CPT | Mod: 26

## 2025-05-07 PROCEDURE — 47100 WEDGE BIOPSY OF LIVER: CPT

## 2025-05-07 PROCEDURE — 36260 INSERTION OF INFUSION PUMP: CPT | Mod: 22

## 2025-05-07 PROCEDURE — 37617 LIGATION MAJOR ARTERY ABD: CPT | Mod: 82

## 2025-05-07 PROCEDURE — 99221 1ST HOSP IP/OBS SF/LOW 40: CPT | Mod: GC

## 2025-05-07 DEVICE — STAPLER COVIDIEN SIGNIA 30MM SHORT GRAY RELOAD: Type: IMPLANTABLE DEVICE | Status: FUNCTIONAL

## 2025-05-07 DEVICE — LIGATING CLIPS WECK HORIZON MEDIUM (BLUE) 24: Type: IMPLANTABLE DEVICE | Status: FUNCTIONAL

## 2025-05-07 DEVICE — PUMP HEPATIC ARTERY INFUSION INTERA 3000: Type: IMPLANTABLE DEVICE | Status: FUNCTIONAL

## 2025-05-07 DEVICE — SURGICEL 2 X 14": Type: IMPLANTABLE DEVICE | Status: FUNCTIONAL

## 2025-05-07 DEVICE — SURGICEL FIBRILLAR 2 X 4": Type: IMPLANTABLE DEVICE | Status: FUNCTIONAL

## 2025-05-07 DEVICE — LIGATING CLIPS WECK HORIZON LARGE (ORANGE) 24: Type: IMPLANTABLE DEVICE | Status: FUNCTIONAL

## 2025-05-07 RX ORDER — NALOXONE HYDROCHLORIDE 0.4 MG/ML
0.1 INJECTION, SOLUTION INTRAMUSCULAR; INTRAVENOUS; SUBCUTANEOUS
Refills: 0 | Status: DISCONTINUED | OUTPATIENT
Start: 2025-05-07 | End: 2025-05-09

## 2025-05-07 RX ORDER — HYDROMORPHONE/SOD CHLOR,ISO/PF 2 MG/10 ML
0.5 SYRINGE (ML) INJECTION
Refills: 0 | Status: DISCONTINUED | OUTPATIENT
Start: 2025-05-07 | End: 2025-05-09

## 2025-05-07 RX ORDER — ACETAMINOPHEN 500 MG/5ML
1000 LIQUID (ML) ORAL EVERY 8 HOURS
Refills: 0 | Status: COMPLETED | OUTPATIENT
Start: 2025-05-07 | End: 2025-05-08

## 2025-05-07 RX ORDER — SODIUM CHLORIDE 9 G/1000ML
1000 INJECTION, SOLUTION INTRAVENOUS
Refills: 0 | Status: DISCONTINUED | OUTPATIENT
Start: 2025-05-07 | End: 2025-05-07

## 2025-05-07 RX ORDER — AMLODIPINE AND OLMESARTAN MEDOXOMIL 5; 40 MG/1; MG/1
1 TABLET ORAL
Refills: 0 | DISCHARGE

## 2025-05-07 RX ORDER — ONDANSETRON HCL/PF 4 MG/2 ML
4 VIAL (ML) INJECTION EVERY 6 HOURS
Refills: 0 | Status: DISCONTINUED | OUTPATIENT
Start: 2025-05-07 | End: 2025-05-09

## 2025-05-07 RX ORDER — ONDANSETRON HCL/PF 4 MG/2 ML
4 VIAL (ML) INJECTION ONCE
Refills: 0 | Status: DISCONTINUED | OUTPATIENT
Start: 2025-05-07 | End: 2025-05-07

## 2025-05-07 RX ORDER — HYDROMORPHONE/SOD CHLOR,ISO/PF 2 MG/10 ML
0.2 SYRINGE (ML) INJECTION
Refills: 0 | Status: DISCONTINUED | OUTPATIENT
Start: 2025-05-07 | End: 2025-05-07

## 2025-05-07 RX ORDER — HYDROMORPHONE/SOD CHLOR,ISO/PF 2 MG/10 ML
0.5 SYRINGE (ML) INJECTION
Refills: 0 | Status: DISCONTINUED | OUTPATIENT
Start: 2025-05-07 | End: 2025-05-07

## 2025-05-07 RX ORDER — BUPIVACAINE 13.3 MG/ML
20 INJECTION, SUSPENSION, LIPOSOMAL INFILTRATION ONCE
Refills: 0 | Status: DISCONTINUED | OUTPATIENT
Start: 2025-05-07 | End: 2025-05-08

## 2025-05-07 RX ORDER — HYDROMORPHONE/SOD CHLOR,ISO/PF 2 MG/10 ML
30 SYRINGE (ML) INJECTION
Refills: 0 | Status: DISCONTINUED | OUTPATIENT
Start: 2025-05-07 | End: 2025-05-09

## 2025-05-07 RX ORDER — MAGNESIUM SULFATE 500 MG/ML
2 SYRINGE (ML) INJECTION ONCE
Refills: 0 | Status: COMPLETED | OUTPATIENT
Start: 2025-05-07 | End: 2025-05-07

## 2025-05-07 RX ADMIN — Medication 30 MILLILITER(S): at 20:01

## 2025-05-07 RX ADMIN — Medication 30 MILLILITER(S): at 23:00

## 2025-05-07 RX ADMIN — Medication 40 MILLIGRAM(S): at 21:30

## 2025-05-07 NOTE — ASU PREOP CHECKLIST - PATIENT SENT TO
[address] [place] [address]                                OPERATIVE REPORT    PATIENT NAME: Estefany GUTIERREZ                    :        1976  MED REC NO:   [id number]                           ROOM:  ACCOUNT NO:   [de-identified]                           ADMIT DATE: 2021  PROVIDER:     MIGUEL Romero Harms OF PROCEDURE:  2021    INDICATIONS:  The patient with history of anemia, mechanical aortic  valve replacement, currently off Coumadin as per [facility]  recommendation. She is on Lovenox. Plan today for EGD to evaluate. ASA CLASSIFICATION:  III. ESTIMATED BLOOD LOSS:  None. PROCEDURE PERFORMED:  EGD    SURGEON:  Brie Guardado MD    DESCRIPTION OF PROCEDURE:  The patient was brought to the GI lab. Consent was obtained. Risks involved with the procedure were explained  to the patient. Informed consent was obtained. The patient was  monitored during the procedure with pulse oximetry, blood pressure  monitoring, oxygen by nasal cannula. Sedation by incremental doses of  IV propofol given by Anesthesia service to achieve total IV anesthesia. For ASA classification and medication given during the procedure, please  see Anesthesia note. PROCEDURE PERFORMED:  EGD. A standard video GIF- Olympus upper scope advanced under direct  vision from the oral cavity up to the duodenum. Esophagus appeared  normal.  No erosions or ulcerations seen. Slight varices at the GE  junction, not very significant,  appeared to be not significant. Scope was  advanced to the stomach. Retroflex examination of the cardia revealed  normal cardia. Mucosa of fundus and body appears normal.  Antrum  appears normal.  Pylorus appears normal.  Scope was advanced up to third  part of the duodenum. A lot of washing done. No vascular malformation,  no GI bleed. No erosions or ulcerations seen.   I elected not to take a biopsy on this patient due to need of anticoagulation because of  mechanical aortic valve replacement. Scope was withdrawn with no  immediate complications. IMPRESSION:  No active GI bleed, no vascular malformation, no  significant clinical findings. PLAN:  1. Resume anticoagulation today. 2.  Follow up GI clinic for evaluation. 3.  Consider capsule endoscopy if there is no explanation to the  anemia. If capsule  endoscopy needs to be done, needs to be done at the time while the  patient on anticoagulation.         Sharona Wu M.D.    D: 03/17/2021 9:41:41       T: 03/17/2021 14:40:14     [email or web address]  Job#: [id number]     Doc#: [id number]    CC: operating room

## 2025-05-07 NOTE — PATIENT PROFILE ADULT - FALL HARM RISK - HARM RISK INTERVENTIONS
Assistance with ambulation/Assistance OOB with selected safe patient handling equipment/Communicate Risk of Fall with Harm to all staff/Monitor for mental status changes/Monitor gait and stability/Reinforce activity limits and safety measures with patient and family/Review medications for side effects contributing to fall risk/Sit up slowly, dangle for a short time, stand at bedside before walking/Tailored Fall Risk Interventions/Use of alarms - bed, chair and/or voice tab/Visual Cue: Yellow wristband and red socks/Bed in lowest position, wheels locked, appropriate side rails in place/Call bell, personal items and telephone in reach/Instruct patient to call for assistance before getting out of bed or chair/Non-slip footwear when patient is out of bed/[place] to call system/Physically safe environment - no spills, clutter or unnecessary equipment/Purposeful Proactive Rounding/Room/bathroom lighting operational, light cord in reach

## 2025-05-07 NOTE — BRIEF OPERATIVE NOTE - NSICDXBRIEFPROCEDURE_GEN_ALL_CORE_FT
PROCEDURES:  Insertion, implantable infusion pump, hepatic artery 07-May-2025 19:24:13  Liam Morrissey

## 2025-05-07 NOTE — CONSULT NOTE ADULT - ASSESSMENT
45-year-old woman with intrahepatic bile duct carcinoma, hypertension, obesity, and an incidentally found mass in the left hepatic lobe who is scheduled for outpatient hepatic artery infusion pump placement, cholecystectomy, and liver biopsy.    There are no absolute contraindications to the planned procedure. There is no evidence of ongoing myocardial ischemia, decompensated heart failure, or unstable cardiac arrhythmia.     The patient's Revised Cardiac Risk Index is 0. This estimates a 0.5% 30-day risk of death, MI or cardiac arrest. The patient's [place]re Perioperative Risk is 0.4% for 30-day risk of myocardial infarction or cardiac arrest.     According to the new AHA/ACC perioperative guidelines, patients with cardiovascular risk factors but low calculated perioperative risk can proceed to surgery without further testing.     These risk estimates should be incorporated into a shared decision making conversation between the patient or their surrogate and the performing practitioner regarding the risks, benefits and alternatives to the procedure and whether to proceed. Additional cardiac testing is unlikely to change this risk assessment or procedural outcomes from a cardiac perspective.     Marshall Hoover MD [facility] FACP  Cardiology  [phone]

## 2025-05-07 NOTE — CONSULT NOTE ADULT - SUBJECTIVE AND OBJECTIVE BOX
I independently evaluated the patient for the [facility]'s Attending Service.   Niece at bedside    The patient is a 45-year-old woman with intrahepatic bile duct carcinoma, hypertension, obesity, and an incidentally found mass in the left hepatic lobe who is scheduled for outpatient hepatic artery infusion pump placement, cholecystectomy, and liver biopsy.    Apart from hypertension, the patient has no known cardiac disease.  No prior myocardial infarction, significant valve disease, heart failure or arrhythmia.     No chest pain  No dyspnea, orthopnea or PND  No palpitations or syncope    The patient reports that she has good exertional capacity, walking up several flights of stairs and throughout the city without symptoms.    PMH/PSH:  Intrahepatic bile duct carcinoma  Mass in the left hepatic lobe  Hypertension  Obesity  Endometrial cancer with hysterectomy, 2024    Comfortable-appearing woman in no acute distress  Alert and oriented  Afebrile  Vital signs stable  No carotid bruits  JVP is not elevated  Clear lungs  Normal heart sounds  Extremities are warm and perfused  No peripheral edema     Labs from May 1:  Normal blood counts    ECG demonstrates sinus rhythm, incomplete RBBB, non-specific ST changes    Echocardiography from 4/28/2025 demonstrates grossly preserved LV function, LVEF 55-60%, diastolic dysfunction, mild MR, mild     Stress testing was performed but is not available for my review. I independently evaluated the patient for the [facility]'s Attending Service.   Niece at bedside    The patient is a 45-year-old woman with intrahepatic bile duct carcinoma, hypertension, obesity, and an incidentally found mass in the left hepatic lobe who is scheduled for outpatient hepatic artery infusion pump placement, cholecystectomy, and liver biopsy.    Apart from hypertension, the patient has no known cardiac disease.  No prior myocardial infarction, significant valve disease, heart failure or arrhythmia.     No chest pain  No dyspnea, orthopnea or PND  No palpitations or syncope    The patient reports that she has good exertional capacity, walking up several flights of stairs and throughout the city without symptoms.    PMH/PSH:  Intrahepatic bile duct carcinoma  Mass in the left hepatic lobe  Hypertension  Obesity  Endometrial cancer with hysterectomy, 2024    Comfortable-appearing woman in no acute distress  Alert and oriented  Afebrile  Vital signs stable  No carotid bruits  JVP is not elevated  Clear lungs  Normal heart sounds  Extremities are warm and perfused  No peripheral edema     Labs from May 1:  Normal blood counts    ECG demonstrates sinus rhythm, incomplete RBBB, non-specific ST changes    Echocardiography from 4/28/2025 demonstrates grossly preserved LV function, LVEF 55-60%, diastolic dysfunction, mild MR, mild AI; estimated pulmonary pressures are normal    Stress testing was performed but is not available for my review.

## 2025-05-07 NOTE — CHART NOTE - NSCHARTNOTEFT_GEN_A_CORE
POST-OPERATIVE NOTE    Subjective:  Patient is s/p hepatic artery infusion pump implantation. Recovering appropriately. She feels well, pain is well controlled. Denies nausea, vomiting, chest pain, SOB.     Vital Signs Last 24 Hrs  T(C): 37.1 (07 May 2025 23:18), Max: 37.1 (07 May 2025 23:18)  T(F): 98.7 (07 May 2025 23:18), Max: 98.7 (07 May 2025 23:18)  HR: 96 (07 May 2025 23:18) (72 - 96)  BP: 106/53 (07 May 2025 23:18) (101/59 - 148/86)  BP(mean): 78 (07 May 2025 22:00) (72 - 85)  RR: 17 (07 May 2025 23:18) (14 - 22)  SpO2: 97% (07 May 2025 23:18) (95% - 100%)    Parameters below as of 07 May 2025 23:18  Patient On (Oxygen Delivery Method): room air      I&O's Detail    07 May 2025 07:01  -  07 May 2025 23:31  --------------------------------------------------------  IN:  Total IN: 0 mL    OUT:    Indwelling Catheter - Urethral (mL): 115 mL  Total OUT: 115 mL    Total NET: -115 mL          PAST MEDICAL & SURGICAL HISTORY:  Malignant neoplasm of endometrium      Morbid obesity      Intrahepatic bile duct carcinoma      History of chemotherapy      History of endometrial biopsy      History of liver biopsy      History of hysterectomy      History of vascular access device            Physical Exam:  General: NAD, resting comfortably in bed  Pulmonary: Nonlabored breathing, no respiratory distress  Cardiovascular: Regular rate  Abdominal: soft, nondistended abdomen. Prevena in place with good seal. Appropriately tender to palpation around incision. No surrounding skin changes or swelling.   Extremities: [facility]      LABS:                        12.2   8.76  )-----------( 194      ( 07 May 2025 20:25 )             36.0     05-07    133[L]  |  103  |  21  ----------------------------<  256[H]  5.3   |  18[L]  |  0.92    Ca    8.0[L]      07 May 2025 20:25  Phos  3.6     05-07  Mg     1.40     05-07    TPro  6.0  /  Alb  3.0[L]  /  TBili  0.9  /  DBili  0.4[H]  /  AST  54[H]  /  ALT  29  /  AlkPhos  59  05-07      CAPILLARY BLOOD GLUCOSE          Radiology and Additional Studies:    Assessment:  The patient is a 45y Female who is now several hours post-op from a     Plan:  - NPO / NGT / mIVF   - Pain control as needed: PCA, ofirmev  - DVT ppx currently held  - OOB and ambulating as tolerated  - F/u AM labs    Surgery D Team  [id number] POST-OPERATIVE NOTE    Subjective:  Patient is s/p hepatic artery infusion pump implantation. Recovering appropriately. She feels well, pain is well controlled. Denies nausea, vomiting, chest pain, SOB.     Vital Signs Last 24 Hrs  T(C): 37.1 (07 May 2025 23:18), Max: 37.1 (07 May 2025 23:18)  T(F): 98.7 (07 May 2025 23:18), Max: 98.7 (07 May 2025 23:18)  HR: 96 (07 May 2025 23:18) (72 - 96)  BP: 106/53 (07 May 2025 23:18) (101/59 - 148/86)  BP(mean): 78 (07 May 2025 22:00) (72 - 85)  RR: 17 (07 May 2025 23:18) (14 - 22)  SpO2: 97% (07 May 2025 23:18) (95% - 100%)    Parameters below as of 07 May 2025 23:18  Patient On (Oxygen Delivery Method): room air      I&O's Detail    07 May 2025 07:01  -  07 May 2025 23:31  --------------------------------------------------------  IN:  Total IN: 0 mL    OUT:    Indwelling Catheter - Urethral (mL): 115 mL  Total OUT: 115 mL    Total NET: -115 mL          PAST MEDICAL & SURGICAL HISTORY:  Malignant neoplasm of endometrium      Morbid obesity      Intrahepatic bile duct carcinoma      History of chemotherapy      History of endometrial biopsy      History of liver biopsy      History of hysterectomy      History of vascular access device            Physical Exam:  General: NAD, resting comfortably in bed  Pulmonary: Nonlabored breathing, no respiratory distress  Cardiovascular: Regular rate  Abdominal: soft, nondistended abdomen. Prevena in place with good seal. Appropriately tender to palpation around incision. No surrounding skin changes or swelling.   Extremities: [facility]      LABS:                        12.2   8.76  )-----------( 194      ( 07 May 2025 20:25 )             36.0     05-07    133[L]  |  103  |  21  ----------------------------<  256[H]  5.3   |  18[L]  |  0.92    Ca    8.0[L]      07 May 2025 20:25  Phos  3.6     05-07  Mg     1.40     05-07    TPro  6.0  /  Alb  3.0[L]  /  TBili  0.9  /  DBili  0.4[H]  /  AST  54[H]  /  ALT  29  /  AlkPhos  59  05-07      CAPILLARY BLOOD GLUCOSE          Radiology and Additional Studies:    Assessment:  The patient is a 45y Female who is now several hours post-op from a     Plan:  - NPO / NGT  - Pain control as needed: PCA, ofirmev  - DVT ppx currently held  - OOB and ambulating as tolerated  - F/u AM labs    Surgery D Team  [id number]

## 2025-05-07 NOTE — BRIEF OPERATIVE NOTE - OPERATION/FINDINGS
Ex lap, cholecystectomy with ligation of cystic duct and artery, noted bile spillage after ligation. Ductotomy in CBD noted and closed with prolene suture. GDA skeletonized, R hepatic skeletonized and identified with noted replaced anatomy from SMA. Pump placed in subcutaneous pocket in LUQ. Proximal and distal control obtained of GDA, arteriotomy made, catheter placed inside. R hepatic ligated with stapler. Methylene blue confirmed liver perfusion. Pump flushing well. Portocaval note sent for permanent.

## 2025-05-08 LAB
ALBUMIN SERPL ELPH-MCNC: 2.9 G/DL — LOW (ref 3.3–5)
ALP SERPL-CCNC: 52 U/L — SIGNIFICANT CHANGE UP (ref 40–120)
ALT FLD-CCNC: 29 U/L — SIGNIFICANT CHANGE UP (ref 4–33)
ANION GAP SERPL CALC-SCNC: 12 MMOL/L — SIGNIFICANT CHANGE UP (ref 7–14)
AST SERPL-CCNC: 47 U/L — HIGH (ref 4–32)
BILIRUB SERPL-MCNC: 0.5 MG/DL — SIGNIFICANT CHANGE UP (ref 0.2–1.2)
BUN SERPL-MCNC: 22 MG/DL — SIGNIFICANT CHANGE UP (ref 7–23)
CALCIUM SERPL-MCNC: 8.2 MG/DL — LOW (ref 8.4–10.5)
CHLORIDE SERPL-SCNC: 102 MMOL/L — SIGNIFICANT CHANGE UP (ref 98–107)
CO2 SERPL-SCNC: 21 MMOL/L — LOW (ref 22–31)
CREAT SERPL-MCNC: 1.18 MG/DL — SIGNIFICANT CHANGE UP (ref 0.5–1.3)
EGFR: 58 ML/MIN/1.73M2 — LOW
EGFR: 58 ML/MIN/1.73M2 — LOW
GLUCOSE SERPL-MCNC: 201 MG/DL — HIGH (ref 70–99)
HCT VFR BLD CALC: 35 % — SIGNIFICANT CHANGE UP (ref 34.5–45)
HGB BLD-MCNC: 11.4 G/DL — LOW (ref 11.5–15.5)
MAGNESIUM SERPL-MCNC: 2.2 MG/DL — SIGNIFICANT CHANGE UP (ref 1.6–2.6)
MCHC RBC-ENTMCNC: 32 PG — SIGNIFICANT CHANGE UP (ref 27–34)
MCHC RBC-ENTMCNC: 32.6 G/DL — SIGNIFICANT CHANGE UP (ref 32–36)
MCV RBC AUTO: 98.3 FL — SIGNIFICANT CHANGE UP (ref 80–100)
NRBC # BLD AUTO: 0 K/UL — SIGNIFICANT CHANGE UP (ref 0–0)
NRBC # FLD: 0 K/UL — SIGNIFICANT CHANGE UP (ref 0–0)
NRBC BLD AUTO-RTO: 0 /100 WBCS — SIGNIFICANT CHANGE UP (ref 0–0)
PHOSPHATE SERPL-MCNC: 4.3 MG/DL — SIGNIFICANT CHANGE UP (ref 2.5–4.5)
PLATELET # BLD AUTO: 179 K/UL — SIGNIFICANT CHANGE UP (ref 150–400)
POTASSIUM SERPL-MCNC: 4.3 MMOL/L — SIGNIFICANT CHANGE UP (ref 3.5–5.3)
POTASSIUM SERPL-SCNC: 4.3 MMOL/L — SIGNIFICANT CHANGE UP (ref 3.5–5.3)
PROT SERPL-MCNC: 5.6 G/DL — LOW (ref 6–8.3)
RBC # BLD: 3.56 M/UL — LOW (ref 3.8–5.2)
RBC # FLD: 12.9 % — SIGNIFICANT CHANGE UP (ref 10.3–14.5)
SODIUM SERPL-SCNC: 135 MMOL/L — SIGNIFICANT CHANGE UP (ref 135–145)
WBC # BLD: 6.41 K/UL — SIGNIFICANT CHANGE UP (ref 3.8–10.5)
WBC # FLD AUTO: 6.41 K/UL — SIGNIFICANT CHANGE UP (ref 3.8–10.5)

## 2025-05-08 RX ORDER — SODIUM CHLORIDE 9 G/1000ML
1000 INJECTION, SOLUTION INTRAVENOUS
Refills: 0 | Status: DISCONTINUED | OUTPATIENT
Start: 2025-05-08 | End: 2025-05-09

## 2025-05-08 RX ORDER — ACETAMINOPHEN 500 MG/5ML
1000 LIQUID (ML) ORAL EVERY 6 HOURS
Refills: 0 | Status: DISCONTINUED | OUTPATIENT
Start: 2025-05-09 | End: 2025-05-09

## 2025-05-08 RX ORDER — HEPARIN SODIUM 1000 [USP'U]/ML
5000 INJECTION INTRAVENOUS; SUBCUTANEOUS EVERY 8 HOURS
Refills: 0 | Status: DISCONTINUED | OUTPATIENT
Start: 2025-05-08 | End: 2025-05-11

## 2025-05-08 RX ORDER — AMLODIPINE BESYLATE 10 MG/1
10 TABLET ORAL DAILY
Refills: 0 | Status: DISCONTINUED | OUTPATIENT
Start: 2025-05-09 | End: 2025-05-11

## 2025-05-08 RX ORDER — SODIUM CHLORIDE 9 G/1000ML
1000 INJECTION, SOLUTION INTRAVENOUS
Refills: 0 | Status: DISCONTINUED | OUTPATIENT
Start: 2025-05-08 | End: 2025-05-08

## 2025-05-08 RX ADMIN — Medication 25 GRAM(S): at 00:14

## 2025-05-08 RX ADMIN — Medication 400 MILLIGRAM(S): at 05:48

## 2025-05-08 RX ADMIN — HEPARIN SODIUM 5000 UNIT(S): 1000 INJECTION INTRAVENOUS; SUBCUTANEOUS at 21:53

## 2025-05-08 RX ADMIN — Medication 30 MILLILITER(S): at 07:29

## 2025-05-08 RX ADMIN — Medication 1000 MILLIGRAM(S): at 15:35

## 2025-05-08 RX ADMIN — Medication 30 MILLILITER(S): at 19:15

## 2025-05-08 RX ADMIN — Medication 1000 MILLIGRAM(S): at 00:25

## 2025-05-08 RX ADMIN — Medication 1000 MILLIGRAM(S): at 06:16

## 2025-05-08 RX ADMIN — SODIUM CHLORIDE 84 MILLILITER(S): 9 INJECTION, SOLUTION INTRAVENOUS at 07:30

## 2025-05-08 RX ADMIN — Medication 400 MILLIGRAM(S): at 15:12

## 2025-05-08 RX ADMIN — Medication 400 MILLIGRAM(S): at 00:14

## 2025-05-08 RX ADMIN — Medication 40 MILLIGRAM(S): at 09:02

## 2025-05-08 RX ADMIN — SODIUM CHLORIDE 84 MILLILITER(S): 9 INJECTION, SOLUTION INTRAVENOUS at 08:59

## 2025-05-08 NOTE — PROGRESS NOTE ADULT - ASSESSMENT
46 y/o female PMH HTN and diagnosis of intrahepatic bile duct carcinoma. Patient now s/p Ex lap, cholecystectomy, and PRIYA pump insertion 5/7/25.    Plan:  - Dc NGT  - NPO with sips/chips  - PT consult  - Can dc stringer once pt is ambulating  - Pain control as needed: PCA, ofirmev  - OOB and ambulating as tolerated  - DVT ppx currently held  - IVF    Surgery D Team  [id number].

## 2025-05-08 NOTE — PHYSICAL THERAPY INITIAL EVALUATION ADULT - GENERAL OBSERVATIONS, REHAB EVAL
Pt encountered seated in reclined chair, no distress, AxOx4, with +IV, +pulse oximeter, and +preveena. Pt agreeable to participate in PT evaluation. Vitals taken; /66mmHg, heart rate 94bpm.

## 2025-05-08 NOTE — DISCHARGE NOTE PROVIDER - NSDCCPCAREPLAN_GEN_ALL_CORE_FT
PRINCIPAL DISCHARGE DIAGNOSIS  Diagnosis: Intrahepatic bile duct carcinoma  Assessment and Plan of Treatment:      PRINCIPAL DISCHARGE DIAGNOSIS  Diagnosis: Intrahepatic bile duct carcinoma  Assessment and Plan of Treatment: s/p exploratory laparotomy, cholecystectomy, hepatic artery infusion pump  WOUND CARE: Cover your INCISION/DRAIN SITES with dry gauze and secure with paper tape. Change dressing daily or when soiled. Once gauze remains dry x24h, you may leave open to air without dressing. Please keep incisions clean and dry. Please do not Scrub or rub incisions. Do not use lotion or powder on incisions.  BATHING: You may shower and/or sponge bathe. You may use warm soapy water in the shower and rinse, pat dry.  ACTIVITY: No heavy lifting (>20lbs) or straining for 8 weeks. Otherwise, you may return to your usual level of physical activity. If you are taking narcotic pain medication DO NOT drive a car, operate machinery or make important decisions.  PAIN: You may take over-the-counter medications for pain. You make take Tylenol orally every 6 hours as needed. Do not exceed 4,000mg a day. You may take ibuprofen every 6 hours. You may alternate between the two for better pain control (every 3 hours). You have been prescribed narcotics for pain management. Please take as prescribed on pill bottle, AS NEEDED, for BREAKTHROUGH pain ONLY. If you are taking narcotic pain medication DO NOT drive a car, operate machinery or make important decisions.  DIET: Return to your usual diet.  NOTIFY YOUR SURGEON IF YOU HAVE: any bleeding that does not stop, any pus draining from your wound(s), any fever (over 100.4 F) persistent nausea/vomiting, or if your pain is not controlled on your discharge pain medications, unable to urinate.  FOLLOW UP:  1. Please follow up with your primary care physician in one week regarding your hospitalization, bring copies of your discharge paperwork.  2. Please follow up with your surgeon, Dr. Gomes. Call [phone] to make an appointment.

## 2025-05-08 NOTE — PHYSICAL THERAPY INITIAL EVALUATION ADULT - NSPTDISCHREC_GEN_A_CORE
Pt is completely independent and used no assistive device with ambulation./No skilled PT needs Pt is completely independent and therefore will be taken off PT Program/No skilled PT needs

## 2025-05-08 NOTE — PHYSICAL THERAPY INITIAL EVALUATION ADULT - PERTINENT HX OF CURRENT PROBLEM, REHAB EVAL
46 y/o female PMH HTN morbid obesity presents to presurgical testing with diagnosis of intrahepatic bile duct carcinoma. Patient initially presented to [facility] in early September 2024 w/ complaints of SOB/generalized weakness, and was found to have a Hgb of 3.7 and received 4u of PRBC. CT angio CAP 09/09/2024 demonstrated incidental findings of a mass in the left hepatic lobe. Also, patient with endometrial thickening s/p biopsy revealing endometrial cancer s/p hysterectomy 10/2024. Pt completed chemo 3/2025. Pt is scheduled for a hepatic artery infusion pump placement cholecystectomy intra op ultrasound portal lymphadenectomy and liver biopsy

## 2025-05-08 NOTE — PHYSICAL THERAPY INITIAL EVALUATION ADULT - ADDITIONAL COMMENTS
Pt lives in private house with her mother and sister with 3 steps to enter. Prior to hospital admission, pt was completely independent and used no assistive device with ambulation. Pt denies any recent falls.    Pt left comfortable seated in chair, NAD, all lines intact, all precautions maintained, with call bell in reach.

## 2025-05-08 NOTE — DISCHARGE NOTE PROVIDER - NSDCCPTREATMENT_GEN_ALL_CORE_FT
PRINCIPAL PROCEDURE  Procedure: Insertion, implantable infusion pump, hepatic artery  Findings and Treatment: WOUND CARE:  Please keep incisions clean and dry. Please do not Scrub or rub incisions. Do not use lotion or powder on incisions.   BATHING: You may shower and/or sponge bathe. You may use warm soapy water in the shower and rinse, pat dry.  ACTIVITY: No heavy lifting or straining. Otherwise, you may return to your usual level of physical activity. If you are taking narcotic pain medication DO NOT drive a car, operate machinery or make important decisions.  DIET: Return to your usual diet.  NOTIFY YOUR SURGEON IF YOU HAVE: any bleeding that does not stop, any pus draining from your wound(s), any fever (over 100.4 F) persistent nausea/vomiting, or if your pain is not controlled on your discharge pain medications, unable to urinate.  Please follow up with your primary care physician in one week regarding your hospitalization, bring copies of your discharge paperwork.  Please follow up with your surgeon, Dr. Gomes     PRINCIPAL PROCEDURE  Procedure: Insertion, implantable infusion pump, hepatic artery  Findings and Treatment:

## 2025-05-08 NOTE — DISCHARGE NOTE PROVIDER - HOSPITAL COURSE
44 y/o female PMH HTN morbid obesity presents to presurgical testing with diagnosis of intrahepatic bile duct carcinoma. Patient initially presented to [facility] in early September 2024 w/ complaints of SOB/generalized weakness, and was found to have a Hgb of 3.7 and received 4u of PRBC. CT angio CAP 09/09/2024 demonstrated incidental findings of a mass in the left hepatic lobe. Also, patient with endometrial thickening s/p biopsy revealing endometrial cancer s/p hysterectomy 10/2024. Pt completed chemo 3/2025. Pt is scheduled for a hepatic artery infusion pump placement cholecystectomy intra op ultrasound portal lymphadenectomy and liver biopsy.   Patient went to the OR on 5/7/25 and is s/p Ex lap, cholecystectomy, and PRIYA pump insertion. Patient tolerated the procedure well without complications. POD1, pt's NGT and stringer were removed. POD2 ********    Post op patient's diet was slowly advanced as tolerated.  At this time, pt is tolerating a regular diet, ambulating and voiding.  Pt has been deemed stable for discharge at this time.   46 y/o female PMH HTN morbid obesity presents to presurgical testing with diagnosis of intrahepatic bile duct carcinoma. Patient initially presented to [facility] in early September 2024 w/ complaints of SOB/generalized weakness, and was found to have a Hgb of 3.7 and received 4u of PRBC. CT angio CAP 09/09/2024 demonstrated incidental findings of a mass in the left hepatic lobe. Also, patient with endometrial thickening s/p biopsy revealing endometrial cancer s/p hysterectomy 10/2024. Pt completed chemo 3/2025. Pt is scheduled for a hepatic artery infusion pump placement cholecystectomy intra op ultrasound portal lymphadenectomy and liver biopsy.   Patient went to the OR on 5/7/25 and is s/p Ex lap, cholecystectomy, and PRIYA pump insertion. Patient tolerated the procedure well without complications. POD1, pt's NGT and stringer were removed. POD2 diet advanced to fulls which patient tolerated well. Pain medication transitioned from IV to oral.   At this time, pt is tolerating a regular diet, ambulating and voiding.  Pt has been deemed stable for discharge at this time.

## 2025-05-08 NOTE — DISCHARGE NOTE PROVIDER - NSDCMRMEDTOKEN_GEN_ALL_CORE_FT
amlodipine-olmesartan 10 mg-20 mg oral tablet: 1 tab(s) orally once a day   acetaminophen 500 mg oral tablet: 2 tab(s) orally every 6 hours  amlodipine-olmesartan 10 mg-20 mg oral tablet: 1 tab(s) orally once a day  pantoprazole 40 mg oral delayed release tablet: 1 tab(s) orally once a day (before a meal) MDD: 1   acetaminophen 500 mg oral tablet: 2 tab(s) orally every 6 hours  amlodipine-olmesartan 10 mg-20 mg oral tablet: 1 tab(s) orally once a day  oxyCODONE 5 mg oral tablet: 1 tab(s) orally every 8 hours MDD: 3 pills  pantoprazole 40 mg oral delayed release tablet: 1 tab(s) orally once a day (before a meal) MDD: 1

## 2025-05-08 NOTE — DISCHARGE NOTE PROVIDER - CARE PROVIDERS DIRECT ADDRESSES
[email or web address]

## 2025-05-08 NOTE — DISCHARGE NOTE PROVIDER - CARE PROVIDER_API CALL
Britany Gomes  [facility] General Surgical Oncology  [address] [place] [id number]  Phone: [phone]  Fax: [phone]  Follow Up Time: 2 weeks

## 2025-05-08 NOTE — DISCHARGE NOTE PROVIDER - NSDCFUSCHEDAPPT_GEN_ALL_CORE_FT
Kaden Physician [facility]  [facility]  Scheduled Appointment: 05/14/2025    Sydni Aguirre  [facility]  [facility]  Scheduled Appointment: 05/14/2025

## 2025-05-09 LAB
ALBUMIN SERPL ELPH-MCNC: 2.8 G/DL — LOW (ref 3.3–5)
ALP SERPL-CCNC: 53 U/L — SIGNIFICANT CHANGE UP (ref 40–120)
ALT FLD-CCNC: 26 U/L — SIGNIFICANT CHANGE UP (ref 4–33)
ANION GAP SERPL CALC-SCNC: 10 MMOL/L — SIGNIFICANT CHANGE UP (ref 7–14)
AST SERPL-CCNC: 37 U/L — HIGH (ref 4–32)
BILIRUB DIRECT SERPL-MCNC: <0.2 MG/DL — SIGNIFICANT CHANGE UP (ref 0–0.3)
BILIRUB INDIRECT FLD-MCNC: >0.3 MG/DL — SIGNIFICANT CHANGE UP (ref 0–1)
BILIRUB SERPL-MCNC: 0.5 MG/DL — SIGNIFICANT CHANGE UP (ref 0.2–1.2)
BLD GP AB SCN SERPL QL: NEGATIVE — SIGNIFICANT CHANGE UP
BUN SERPL-MCNC: 11 MG/DL — SIGNIFICANT CHANGE UP (ref 7–23)
CALCIUM SERPL-MCNC: 8.1 MG/DL — LOW (ref 8.4–10.5)
CHLORIDE SERPL-SCNC: 101 MMOL/L — SIGNIFICANT CHANGE UP (ref 98–107)
CO2 SERPL-SCNC: 24 MMOL/L — SIGNIFICANT CHANGE UP (ref 22–31)
CREAT SERPL-MCNC: 0.78 MG/DL — SIGNIFICANT CHANGE UP (ref 0.5–1.3)
EGFR: 95 ML/MIN/1.73M2 — SIGNIFICANT CHANGE UP
EGFR: 95 ML/MIN/1.73M2 — SIGNIFICANT CHANGE UP
GLUCOSE SERPL-MCNC: 240 MG/DL — HIGH (ref 70–99)
HCT VFR BLD CALC: 30.8 % — LOW (ref 34.5–45)
HGB BLD-MCNC: 10.5 G/DL — LOW (ref 11.5–15.5)
MAGNESIUM SERPL-MCNC: 1.8 MG/DL — SIGNIFICANT CHANGE UP (ref 1.6–2.6)
MCHC RBC-ENTMCNC: 32.1 PG — SIGNIFICANT CHANGE UP (ref 27–34)
MCHC RBC-ENTMCNC: 34.1 G/DL — SIGNIFICANT CHANGE UP (ref 32–36)
MCV RBC AUTO: 94.2 FL — SIGNIFICANT CHANGE UP (ref 80–100)
NRBC # BLD AUTO: 0 K/UL — SIGNIFICANT CHANGE UP (ref 0–0)
NRBC # FLD: 0 K/UL — SIGNIFICANT CHANGE UP (ref 0–0)
NRBC BLD AUTO-RTO: 0 /100 WBCS — SIGNIFICANT CHANGE UP (ref 0–0)
PHOSPHATE SERPL-MCNC: 2.5 MG/DL — SIGNIFICANT CHANGE UP (ref 2.5–4.5)
PLATELET # BLD AUTO: 146 K/UL — LOW (ref 150–400)
POTASSIUM SERPL-MCNC: 4 MMOL/L — SIGNIFICANT CHANGE UP (ref 3.5–5.3)
POTASSIUM SERPL-SCNC: 4 MMOL/L — SIGNIFICANT CHANGE UP (ref 3.5–5.3)
PROT SERPL-MCNC: 5.9 G/DL — LOW (ref 6–8.3)
RBC # BLD: 3.27 M/UL — LOW (ref 3.8–5.2)
RBC # FLD: 12.4 % — SIGNIFICANT CHANGE UP (ref 10.3–14.5)
RH IG SCN BLD-IMP: POSITIVE — SIGNIFICANT CHANGE UP
SODIUM SERPL-SCNC: 135 MMOL/L — SIGNIFICANT CHANGE UP (ref 135–145)
WBC # BLD: 6.33 K/UL — SIGNIFICANT CHANGE UP (ref 3.8–10.5)
WBC # FLD AUTO: 6.33 K/UL — SIGNIFICANT CHANGE UP (ref 3.8–10.5)

## 2025-05-09 RX ORDER — SOD PHOS DI, MONO/K PHOS MONO 250 MG
1 TABLET ORAL ONCE
Refills: 0 | Status: COMPLETED | OUTPATIENT
Start: 2025-05-09 | End: 2025-05-09

## 2025-05-09 RX ORDER — CYCLOBENZAPRINE HYDROCHLORIDE 15 MG/1
5 CAPSULE, EXTENDED RELEASE ORAL THREE TIMES A DAY
Refills: 0 | Status: DISCONTINUED | OUTPATIENT
Start: 2025-05-09 | End: 2025-05-11

## 2025-05-09 RX ORDER — OXYCODONE HYDROCHLORIDE 30 MG/1
5 TABLET ORAL EVERY 4 HOURS
Refills: 0 | Status: DISCONTINUED | OUTPATIENT
Start: 2025-05-09 | End: 2025-05-11

## 2025-05-09 RX ORDER — ACETAMINOPHEN 500 MG/5ML
650 LIQUID (ML) ORAL EVERY 6 HOURS
Refills: 0 | Status: DISCONTINUED | OUTPATIENT
Start: 2025-05-09 | End: 2025-05-09

## 2025-05-09 RX ORDER — MAGNESIUM SULFATE 500 MG/ML
2 SYRINGE (ML) INJECTION ONCE
Refills: 0 | Status: COMPLETED | OUTPATIENT
Start: 2025-05-09 | End: 2025-05-09

## 2025-05-09 RX ORDER — OXYCODONE HYDROCHLORIDE 30 MG/1
7.5 TABLET ORAL EVERY 4 HOURS
Refills: 0 | Status: DISCONTINUED | OUTPATIENT
Start: 2025-05-09 | End: 2025-05-11

## 2025-05-09 RX ORDER — ACETAMINOPHEN 500 MG/5ML
2 LIQUID (ML) ORAL
Qty: 0 | Refills: 0 | DISCHARGE
Start: 2025-05-09

## 2025-05-09 RX ORDER — ACETAMINOPHEN 500 MG/5ML
1000 LIQUID (ML) ORAL EVERY 6 HOURS
Refills: 0 | Status: DISCONTINUED | OUTPATIENT
Start: 2025-05-09 | End: 2025-05-11

## 2025-05-09 RX ORDER — LOSARTAN POTASSIUM 100 MG/1
50 TABLET, FILM COATED ORAL DAILY
Refills: 0 | Status: DISCONTINUED | OUTPATIENT
Start: 2025-05-09 | End: 2025-05-11

## 2025-05-09 RX ADMIN — Medication 1000 MILLIGRAM(S): at 21:54

## 2025-05-09 RX ADMIN — OXYCODONE HYDROCHLORIDE 5 MILLIGRAM(S): 30 TABLET ORAL at 18:50

## 2025-05-09 RX ADMIN — HEPARIN SODIUM 5000 UNIT(S): 1000 INJECTION INTRAVENOUS; SUBCUTANEOUS at 21:24

## 2025-05-09 RX ADMIN — Medication 1000 MILLIGRAM(S): at 13:50

## 2025-05-09 RX ADMIN — Medication 30 MILLILITER(S): at 07:13

## 2025-05-09 RX ADMIN — Medication 1000 MILLIGRAM(S): at 06:30

## 2025-05-09 RX ADMIN — AMLODIPINE BESYLATE 10 MILLIGRAM(S): 10 TABLET ORAL at 05:53

## 2025-05-09 RX ADMIN — Medication 400 MILLIGRAM(S): at 05:54

## 2025-05-09 RX ADMIN — Medication 1000 MILLIGRAM(S): at 00:35

## 2025-05-09 RX ADMIN — Medication 1 TABLET(S): at 08:52

## 2025-05-09 RX ADMIN — LOSARTAN POTASSIUM 50 MILLIGRAM(S): 100 TABLET, FILM COATED ORAL at 05:53

## 2025-05-09 RX ADMIN — OXYCODONE HYDROCHLORIDE 5 MILLIGRAM(S): 30 TABLET ORAL at 19:20

## 2025-05-09 RX ADMIN — Medication 40 MILLIGRAM(S): at 05:53

## 2025-05-09 RX ADMIN — Medication 25 GRAM(S): at 08:51

## 2025-05-09 RX ADMIN — SODIUM CHLORIDE 30 MILLILITER(S): 9 INJECTION, SOLUTION INTRAVENOUS at 08:50

## 2025-05-09 RX ADMIN — HEPARIN SODIUM 5000 UNIT(S): 1000 INJECTION INTRAVENOUS; SUBCUTANEOUS at 05:54

## 2025-05-09 RX ADMIN — Medication 1000 MILLIGRAM(S): at 13:16

## 2025-05-09 RX ADMIN — Medication 400 MILLIGRAM(S): at 00:26

## 2025-05-09 RX ADMIN — Medication 1000 MILLIGRAM(S): at 21:24

## 2025-05-09 RX ADMIN — HEPARIN SODIUM 5000 UNIT(S): 1000 INJECTION INTRAVENOUS; SUBCUTANEOUS at 13:15

## 2025-05-09 NOTE — PROGRESS NOTE ADULT - ASSESSMENT
44 y/o female PMH HTN and diagnosis of intrahepatic bile duct carcinoma. Patient now s/p Ex lap, cholecystectomy, and PRIYA pump insertion 5/7/25.    PLAN  - Pain control Tylenol, PCA, once tolerating PO will transition off PCA and on PO medications  - OOB/ Ambulate/ IS while in bed  - IVL until tolerating enough PO  - HTN on home amlodipine, losartan   - Clear liquid diet  - Protonix daily  - VTE ppx SQH, SCDs    D Team Surgery  [id number]  46 y/o female PMH HTN and diagnosis of intrahepatic bile duct carcinoma. Patient now s/p Ex lap, cholecystectomy, and PRIYA pump insertion 5/7/25.    PLAN  - Pain control Tylenol, PCA, once tolerating PO will transition off PCA and on PO medications  - OOB/ Ambulate/ IS while in bed  - IVL until tolerating enough PO  - HTN on home amlodipine, losartan   - Clear liquid diet  - Protonix daily  - VTE ppx SQH, SCDs  - Discharge home this weekend once tolerating regular diet and pain controlled on oral regimen, no PT or homecare needs    D Team Surgery  [id number]

## 2025-05-10 LAB
ALBUMIN SERPL ELPH-MCNC: 3.1 G/DL — LOW (ref 3.3–5)
ALP SERPL-CCNC: 67 U/L — SIGNIFICANT CHANGE UP (ref 40–120)
ALT FLD-CCNC: 27 U/L — SIGNIFICANT CHANGE UP (ref 4–33)
ANION GAP SERPL CALC-SCNC: 11 MMOL/L — SIGNIFICANT CHANGE UP (ref 7–14)
AST SERPL-CCNC: 39 U/L — HIGH (ref 4–32)
BILIRUB DIRECT SERPL-MCNC: <0.2 MG/DL — SIGNIFICANT CHANGE UP (ref 0–0.3)
BILIRUB INDIRECT FLD-MCNC: >0.3 MG/DL — SIGNIFICANT CHANGE UP (ref 0–1)
BILIRUB SERPL-MCNC: 0.5 MG/DL — SIGNIFICANT CHANGE UP (ref 0.2–1.2)
BUN SERPL-MCNC: 7 MG/DL — SIGNIFICANT CHANGE UP (ref 7–23)
CALCIUM SERPL-MCNC: 8.5 MG/DL — SIGNIFICANT CHANGE UP (ref 8.4–10.5)
CHLORIDE SERPL-SCNC: 103 MMOL/L — SIGNIFICANT CHANGE UP (ref 98–107)
CO2 SERPL-SCNC: 23 MMOL/L — SIGNIFICANT CHANGE UP (ref 22–31)
CREAT SERPL-MCNC: 0.68 MG/DL — SIGNIFICANT CHANGE UP (ref 0.5–1.3)
EGFR: 109 ML/MIN/1.73M2 — SIGNIFICANT CHANGE UP
EGFR: 109 ML/MIN/1.73M2 — SIGNIFICANT CHANGE UP
GLUCOSE SERPL-MCNC: 188 MG/DL — HIGH (ref 70–99)
HCT VFR BLD CALC: 33.9 % — LOW (ref 34.5–45)
HGB BLD-MCNC: 11.4 G/DL — LOW (ref 11.5–15.5)
MAGNESIUM SERPL-MCNC: 1.9 MG/DL — SIGNIFICANT CHANGE UP (ref 1.6–2.6)
MCHC RBC-ENTMCNC: 32 PG — SIGNIFICANT CHANGE UP (ref 27–34)
MCHC RBC-ENTMCNC: 33.6 G/DL — SIGNIFICANT CHANGE UP (ref 32–36)
MCV RBC AUTO: 95.2 FL — SIGNIFICANT CHANGE UP (ref 80–100)
NRBC # BLD AUTO: 0 K/UL — SIGNIFICANT CHANGE UP (ref 0–0)
NRBC # FLD: 0 K/UL — SIGNIFICANT CHANGE UP (ref 0–0)
NRBC BLD AUTO-RTO: 0 /100 WBCS — SIGNIFICANT CHANGE UP (ref 0–0)
PHOSPHATE SERPL-MCNC: 2.5 MG/DL — SIGNIFICANT CHANGE UP (ref 2.5–4.5)
PLATELET # BLD AUTO: 165 K/UL — SIGNIFICANT CHANGE UP (ref 150–400)
POTASSIUM SERPL-MCNC: 4 MMOL/L — SIGNIFICANT CHANGE UP (ref 3.5–5.3)
POTASSIUM SERPL-SCNC: 4 MMOL/L — SIGNIFICANT CHANGE UP (ref 3.5–5.3)
PROT SERPL-MCNC: 6.5 G/DL — SIGNIFICANT CHANGE UP (ref 6–8.3)
RBC # BLD: 3.56 M/UL — LOW (ref 3.8–5.2)
RBC # FLD: 12.3 % — SIGNIFICANT CHANGE UP (ref 10.3–14.5)
SODIUM SERPL-SCNC: 137 MMOL/L — SIGNIFICANT CHANGE UP (ref 135–145)
WBC # BLD: 5.23 K/UL — SIGNIFICANT CHANGE UP (ref 3.8–10.5)
WBC # FLD AUTO: 5.23 K/UL — SIGNIFICANT CHANGE UP (ref 3.8–10.5)

## 2025-05-10 RX ADMIN — HEPARIN SODIUM 5000 UNIT(S): 1000 INJECTION INTRAVENOUS; SUBCUTANEOUS at 05:22

## 2025-05-10 RX ADMIN — Medication 1000 MILLIGRAM(S): at 18:15

## 2025-05-10 RX ADMIN — Medication 40 MILLIGRAM(S): at 05:22

## 2025-05-10 RX ADMIN — Medication 1000 MILLIGRAM(S): at 12:10

## 2025-05-10 RX ADMIN — Medication 1000 MILLIGRAM(S): at 11:36

## 2025-05-10 RX ADMIN — Medication 1000 MILLIGRAM(S): at 17:45

## 2025-05-10 RX ADMIN — LOSARTAN POTASSIUM 50 MILLIGRAM(S): 100 TABLET, FILM COATED ORAL at 05:22

## 2025-05-10 RX ADMIN — HEPARIN SODIUM 5000 UNIT(S): 1000 INJECTION INTRAVENOUS; SUBCUTANEOUS at 14:15

## 2025-05-10 RX ADMIN — AMLODIPINE BESYLATE 10 MILLIGRAM(S): 10 TABLET ORAL at 05:22

## 2025-05-10 RX ADMIN — HEPARIN SODIUM 5000 UNIT(S): 1000 INJECTION INTRAVENOUS; SUBCUTANEOUS at 22:46

## 2025-05-10 RX ADMIN — Medication 1000 MILLIGRAM(S): at 05:52

## 2025-05-10 RX ADMIN — Medication 1000 MILLIGRAM(S): at 05:22

## 2025-05-10 NOTE — PROGRESS NOTE ADULT - ASSESSMENT
46 y/o female PMH HTN and diagnosis of intrahepatic bile duct carcinoma. Patient now s/p Ex lap, cholecystectomy, and PRIYA pump insertion 5/7/25.    PLAN  - PO pain meds  - OOB/ Ambulate/ IS while in bed  - advance diet to regular  - HTN on home amlodipine, losartan   - Protonix daily  - VTE ppx SQH, SCDs  - no PT needs    Seen with Dr. Ovalles    D Team Surgery  [id number]

## 2025-05-11 VITALS
TEMPERATURE: 98 F | HEART RATE: 85 BPM | SYSTOLIC BLOOD PRESSURE: 118 MMHG | OXYGEN SATURATION: 100 % | DIASTOLIC BLOOD PRESSURE: 69 MMHG | RESPIRATION RATE: 18 BRPM

## 2025-05-11 LAB
ALBUMIN SERPL ELPH-MCNC: 3.1 G/DL — LOW (ref 3.3–5)
ALP SERPL-CCNC: 75 U/L — SIGNIFICANT CHANGE UP (ref 40–120)
ALT FLD-CCNC: 24 U/L — SIGNIFICANT CHANGE UP (ref 4–33)
ANION GAP SERPL CALC-SCNC: 10 MMOL/L — SIGNIFICANT CHANGE UP (ref 7–14)
AST SERPL-CCNC: 34 U/L — HIGH (ref 4–32)
BILIRUB SERPL-MCNC: 0.3 MG/DL — SIGNIFICANT CHANGE UP (ref 0.2–1.2)
BUN SERPL-MCNC: 11 MG/DL — SIGNIFICANT CHANGE UP (ref 7–23)
CALCIUM SERPL-MCNC: 8.7 MG/DL — SIGNIFICANT CHANGE UP (ref 8.4–10.5)
CHLORIDE SERPL-SCNC: 104 MMOL/L — SIGNIFICANT CHANGE UP (ref 98–107)
CO2 SERPL-SCNC: 23 MMOL/L — SIGNIFICANT CHANGE UP (ref 22–31)
CREAT SERPL-MCNC: 0.68 MG/DL — SIGNIFICANT CHANGE UP (ref 0.5–1.3)
EGFR: 109 ML/MIN/1.73M2 — SIGNIFICANT CHANGE UP
EGFR: 109 ML/MIN/1.73M2 — SIGNIFICANT CHANGE UP
GLUCOSE SERPL-MCNC: 190 MG/DL — HIGH (ref 70–99)
HCT VFR BLD CALC: 31.6 % — LOW (ref 34.5–45)
HGB BLD-MCNC: 10.6 G/DL — LOW (ref 11.5–15.5)
MAGNESIUM SERPL-MCNC: 1.8 MG/DL — SIGNIFICANT CHANGE UP (ref 1.6–2.6)
MCHC RBC-ENTMCNC: 32 PG — SIGNIFICANT CHANGE UP (ref 27–34)
MCHC RBC-ENTMCNC: 33.5 G/DL — SIGNIFICANT CHANGE UP (ref 32–36)
MCV RBC AUTO: 95.5 FL — SIGNIFICANT CHANGE UP (ref 80–100)
NRBC # BLD AUTO: 0 K/UL — SIGNIFICANT CHANGE UP (ref 0–0)
NRBC # FLD: 0 K/UL — SIGNIFICANT CHANGE UP (ref 0–0)
NRBC BLD AUTO-RTO: 0 /100 WBCS — SIGNIFICANT CHANGE UP (ref 0–0)
PHOSPHATE SERPL-MCNC: 2.9 MG/DL — SIGNIFICANT CHANGE UP (ref 2.5–4.5)
PLATELET # BLD AUTO: 188 K/UL — SIGNIFICANT CHANGE UP (ref 150–400)
POTASSIUM SERPL-MCNC: 3.9 MMOL/L — SIGNIFICANT CHANGE UP (ref 3.5–5.3)
POTASSIUM SERPL-SCNC: 3.9 MMOL/L — SIGNIFICANT CHANGE UP (ref 3.5–5.3)
PROT SERPL-MCNC: 6.5 G/DL — SIGNIFICANT CHANGE UP (ref 6–8.3)
RBC # BLD: 3.31 M/UL — LOW (ref 3.8–5.2)
RBC # FLD: 12.3 % — SIGNIFICANT CHANGE UP (ref 10.3–14.5)
SODIUM SERPL-SCNC: 137 MMOL/L — SIGNIFICANT CHANGE UP (ref 135–145)
WBC # BLD: 4.1 K/UL — SIGNIFICANT CHANGE UP (ref 3.8–10.5)
WBC # FLD AUTO: 4.1 K/UL — SIGNIFICANT CHANGE UP (ref 3.8–10.5)

## 2025-05-11 PROCEDURE — 99238 HOSP IP/OBS DSCHRG MGMT 30/<: CPT

## 2025-05-11 RX ORDER — OXYCODONE HYDROCHLORIDE 30 MG/1
1 TABLET ORAL
Qty: 10 | Refills: 0
Start: 2025-05-11

## 2025-05-11 RX ADMIN — HEPARIN SODIUM 5000 UNIT(S): 1000 INJECTION INTRAVENOUS; SUBCUTANEOUS at 05:31

## 2025-05-11 RX ADMIN — LOSARTAN POTASSIUM 50 MILLIGRAM(S): 100 TABLET, FILM COATED ORAL at 05:30

## 2025-05-11 RX ADMIN — Medication 1000 MILLIGRAM(S): at 05:30

## 2025-05-11 RX ADMIN — Medication 1000 MILLIGRAM(S): at 13:14

## 2025-05-11 RX ADMIN — AMLODIPINE BESYLATE 10 MILLIGRAM(S): 10 TABLET ORAL at 05:30

## 2025-05-11 RX ADMIN — Medication 40 MILLIGRAM(S): at 05:30

## 2025-05-11 RX ADMIN — Medication 1000 MILLIGRAM(S): at 12:44

## 2025-05-11 RX ADMIN — Medication 1000 MILLIGRAM(S): at 06:00

## 2025-05-11 NOTE — DISCHARGE NOTE NURSING/CASE MANAGEMENT/SOCIAL WORK - FINANCIAL ASSISTANCE
[facility] provides services at a reduced cost to those who are determined to be eligible through [facility]’s financial assistance program. Information regarding [facility]’s financial assistance program can be found by going to [email or web address] or by calling [phone].

## 2025-05-11 NOTE — PROGRESS NOTE ADULT - ASSESSMENT
46 y/o female PMH HTN and diagnosis of intrahepatic bile duct carcinoma. Patient now s/p Ex lap, cholecystectomy, and PRIYA pump insertion 5/7/25.    PLAN  - PO pain meds  - OOB/ Ambulate/ IS while in bed  - advance diet to regular  - HTN on home amlodipine, losartan   - Protonix daily  - VTE ppx SQH, SCDs  - no PT needs  - d/c home today    Seen with Dr. Ovalles    D Team Surgery  [id number]

## 2025-05-11 NOTE — PROGRESS NOTE ADULT - SUBJECTIVE AND OBJECTIVE BOX
Anesthesia Pain Management Service    SUBJECTIVE: Patient is doing well with IV PCA and no significant problems reported.    Pain Scale Score	At rest: _0/10__ 	With Activity: ___ 	[X ] Refer to charted pain scores    THERAPY:    [ ] IV PCA Morphine		[ ] 5 mg/mL	[ ] 1 mg/mL  [X ] IV PCA Hydromorphone	[ ] 5 mg/mL	[X ] 1 mg/mL  [ ] IV PCA Fentanyl		[ ] 50 micrograms/mL    Demand dose __0.2_ lockout __6_ (minutes) Continuous Rate _0__ Total: __2.4_  mg used (in past 24 hours)      MEDICATIONS  (STANDING):  acetaminophen     Tablet .. 650 milliGRAM(s) Oral every 6 hours  amLODIPine   Tablet 10 milliGRAM(s) Oral daily  dextrose 5% + lactated ringers. 1000 milliLiter(s) (30 mL/Hr) IV Continuous <Continuous>  heparin   Injectable 5000 Unit(s) SubCutaneous every 8 hours  HYDROmorphone PCA (1 mG/mL) 30 milliLiter(s) PCA Continuous PCA Continuous  losartan 50 milliGRAM(s) Oral daily  morphine PF Spinal 0.2 milliGRAM(s) IntraThecal. once    MEDICATIONS  (PRN):  HYDROmorphone PCA (1 mG/mL) Rescue Clinician Bolus 0.5 milliGRAM(s) IV Push every 15 minutes PRN for Pain Scale GREATER THAN 6  naloxone Injectable 0.1 milliGRAM(s) IV Push every 3 minutes PRN For ANY of the following changes in patient status:  A. RR LESS THAN 10 breaths per minute, B. Oxygen saturation LESS THAN 90%, C. Sedation score of 6  naloxone Injectable 0.1 milliGRAM(s) IV Push every 3 minutes PRN For ANY of the following changes in patient status:  A. RR LESS THAN 10 breaths per minute, B. Oxygen saturation LESS THAN 90%, C. Sedation score of 6  ondansetron Injectable 4 milliGRAM(s) IV Push every 6 hours PRN Nausea      OBJECTIVE:  Patient is sitting up in chair, tolerating clear diet.     Sedation Score:	[ X] Alert	 [ ] Drowsy 	[ ] Arousable	[ ] Asleep	[ ] Unresponsive    Side Effects:	[X ] None	[ ] Nausea	[ ] Vomiting	[ ] Pruritus  		[ ] Other:    Vital Signs Last 24 Hrs  T(C): 36.4 (09 May 2025 08:50), Max: 37.5 (09 May 2025 00:17)  T(F): 97.5 (09 May 2025 08:50), Max: 99.5 (09 May 2025 00:17)  HR: 89 (09 May 2025 08:50) (89 - 100)  BP: 141/72 (09 May 2025 08:50) (137/62 - 181/72)  BP(mean): --  RR: 19 (09 May 2025 08:50) (18 - 19)  SpO2: 98% (09 May 2025 08:50) (91% - 98%)    Parameters below as of 09 May 2025 08:50  Patient On (Oxygen Delivery Method): room air        ASSESSMENT/ PLAN    Therapy to  be:	[ X] Continue   [ ] Discontinued   [ ] Change to prn Analgesics    Documentation and Verification of current medications:   [X] Done	[ ] Not done, not elligible    Comments: Will continue with IV Dilaudid PCA for now.  Will call primary surgical team if patient can transition to oral pain medications.  Recommend non-opioid adjuvant analgesics to be used when possible and when allowed by primary surgical team.    Progress Note written now but Patient was seen earlier.
D TEAM Surgery Progress Note  Patient is a 45y old  Female who presents with a chief complaint of "I'm having a liver chemo pump, cholecystectomy, and liver biopsy" (01 May 2025 18:09)    INTERVAL EVENTS: Patient is POD#2 s/p exploratory laparotomy, HAIP placement, cholecystectomy. No acute events overnight.    SUBJECTIVE: Patient seen and examined at bedside with surgical team, patient without complaints. Abdominal pain is well controlled. Tolerating sips of water without nausea, vomiting. Passing flatus and had a bowel movement this morning. Has been OOB and ambulating. Voiding spontaneously. Denies fever, chills, CP, SOB.    OBJECTIVE:    Vital Signs Last 24 Hrs  T(C): 36.9 (09 May 2025 04:45), Max: 37.5 (09 May 2025 00:17)  T(F): 98.4 (09 May 2025 04:45), Max: 99.5 (09 May 2025 00:17)  HR: 100 (09 May 2025 04:45) (84 - 100)  BP: 137/62 (09 May 2025 04:45) (125/52 - 181/72)  BP(mean): --  RR: 18 (09 May 2025 04:45) (18 - 18)  SpO2: 92% (09 May 2025 04:45) (91% - 98%)    Parameters below as of 09 May 2025 04:45  Patient On (Oxygen Delivery Method): room air    I&O's Detail    08 May 2025 07:01  -  09 May 2025 07:00  --------------------------------------------------------  IN:    dextrose 5% + lactated ringers: 924 mL    Lactated Ringers: 84 mL    Oral Fluid: 340 mL  Total IN: 1348 mL    OUT:    Indwelling Catheter - Urethral (mL): 900 mL    Voided (mL): 1200 mL  Total OUT: 2100 mL    Total NET: -752 mL      MEDICATIONS  (STANDING):  acetaminophen   IVPB .. 1000 milliGRAM(s) IV Intermittent every 6 hours  amLODIPine   Tablet 10 milliGRAM(s) Oral daily  dextrose 5% + lactated ringers. 1000 milliLiter(s) (84 mL/Hr) IV Continuous <Continuous>  heparin   Injectable 5000 Unit(s) SubCutaneous every 8 hours  HYDROmorphone PCA (1 mG/mL) 30 milliLiter(s) PCA Continuous PCA Continuous  losartan 50 milliGRAM(s) Oral daily  morphine PF Spinal 0.2 milliGRAM(s) IntraThecal. once    MEDICATIONS  (PRN):  HYDROmorphone PCA (1 mG/mL) Rescue Clinician Bolus 0.5 milliGRAM(s) IV Push every 15 minutes PRN for Pain Scale GREATER THAN 6  naloxone Injectable 0.1 milliGRAM(s) IV Push every 3 minutes PRN For ANY of the following changes in patient status:  A. RR LESS THAN 10 breaths per minute, B. Oxygen saturation LESS THAN 90%, C. Sedation score of 6  naloxone Injectable 0.1 milliGRAM(s) IV Push every 3 minutes PRN For ANY of the following changes in patient status:  A. RR LESS THAN 10 breaths per minute, B. Oxygen saturation LESS THAN 90%, C. Sedation score of 6  ondansetron Injectable 4 milliGRAM(s) IV Push every 6 hours PRN Nausea      PHYSICAL EXAM:  Constitutional: A&Ox3, NAD  Respiratory: Unlabored breathing  Abdomen: Soft, nondistended, NTTP. No rebound or guarding. Midline incision with prevena. Left subcostal pump pocket C/D/I.  Extremities: WWP, CLIFFORD spontaneously    LABS:                        11.4   6.41  )-----------( 179      ( 08 May 2025 06:42 )             35.0     05-08    135  |  102  |  22  ----------------------------<  201[H]  4.3   |  21[L]  |  1.18    Ca    8.2[L]      08 May 2025 06:42  Phos  4.3     05-08  Mg     2.20     05-08    TPro  5.6[L]  /  Alb  2.9[L]  /  TBili  0.5  /  DBili  x   /  AST  47[H]  /  ALT  29  /  AlkPhos  52  05-08      LIVER FUNCTIONS - ( 08 May 2025 06:42 )  Alb: 2.9 g/dL / Pro: 5.6 g/dL / ALK PHOS: 52 U/L / ALT: 29 U/L / AST: 47 U/L / GGT: x           Urinalysis Basic - ( 08 May 2025 06:42 )    Color: x / Appearance: x / SG: x / pH: x  Gluc: 201 mg/dL / Ketone: x  / Bili: x / Urobili: x   Blood: x / Protein: x / Nitrite: x   Leuk Esterase: x / RBC: x / WBC x   Sq Epi: x / Non Sq Epi: x / Bacteria: x          
ANESTHESIA POSTOP CHECK    45y Female POSTOP DAY 1 S/P HAIP placement and cholecystectomy.    Vital Signs Last 24 Hrs  T(C): 36.8 (08 May 2025 08:50), Max: 37.1 (07 May 2025 23:18)  T(F): 98.3 (08 May 2025 08:50), Max: 98.7 (07 May 2025 23:18)  HR: 84 (08 May 2025 08:50) (72 - 96)  BP: 125/52 (08 May 2025 08:50) (101/59 - 130/64)  BP(mean): 75 (07 May 2025 22:30) (72 - 85)  RR: 18 (08 May 2025 08:50) (15 - 22)  SpO2: 97% (08 May 2025 08:50) (95% - 99%)    Parameters below as of 08 May 2025 08:50  Patient On (Oxygen Delivery Method): room air      I&O's Summary    07 May 2025 07:01  -  08 May 2025 07:00  --------------------------------------------------------  IN: 420 mL / OUT: 495 mL / NET: -75 mL    08 May 2025 07:01  -  08 May 2025 10:41  --------------------------------------------------------  IN: 252 mL / OUT: 400 mL / NET: -148 mL        [x ] NO APPARENT ANESTHESIA COMPLICATIONS    
Anesthesia Pain Management Service    SUBJECTIVE: Patient is doing well with IV PCA and no significant problems reported.    Pain Scale Score	At rest: _3/10__ 	With Activity: ___ 	[X ] Refer to charted pain scores    THERAPY:    [ ] IV PCA Morphine		[ ] 5 mg/mL	[ ] 1 mg/mL  [X ] IV PCA Hydromorphone	[ ] 5 mg/mL	[X ] 1 mg/mL  [ ] IV PCA Fentanyl		[ ] 50 micrograms/mL    Demand dose __0.2_ lockout __6_ (minutes) Continuous Rate _0__ Total: _0.2__   mg used (in past 24 hrs)      MEDICATIONS  (STANDING):  acetaminophen   IVPB .. 1000 milliGRAM(s) IV Intermittent every 8 hours  dextrose 5% + lactated ringers. 1000 milliLiter(s) (84 mL/Hr) IV Continuous <Continuous>  HYDROmorphone PCA (1 mG/mL) 30 milliLiter(s) PCA Continuous PCA Continuous  morphine PF Spinal 0.2 milliGRAM(s) IntraThecal. once  pantoprazole  Injectable 40 milliGRAM(s) IV Push with breakfast    MEDICATIONS  (PRN):  HYDROmorphone PCA (1 mG/mL) Rescue Clinician Bolus 0.5 milliGRAM(s) IV Push every 15 minutes PRN for Pain Scale GREATER THAN 6  naloxone Injectable 0.1 milliGRAM(s) IV Push every 3 minutes PRN For ANY of the following changes in patient status:  A. RR LESS THAN 10 breaths per minute, B. Oxygen saturation LESS THAN 90%, C. Sedation score of 6  naloxone Injectable 0.1 milliGRAM(s) IV Push every 3 minutes PRN For ANY of the following changes in patient status:  A. RR LESS THAN 10 breaths per minute, B. Oxygen saturation LESS THAN 90%, C. Sedation score of 6  ondansetron Injectable 4 milliGRAM(s) IV Push every 6 hours PRN Nausea      OBJECTIVE: Patient sitting in chair. Midline abdominal incision. TTP over left side. Zabala catheter.    Sedation Score:	[ X] Alert	[ ] Drowsy 	[ ] Arousable	[ ] Asleep	[ ] Unresponsive    Side Effects:	[X ] None	[ ] Nausea	[ ] Vomiting	[ ] Pruritus  		[ ] Other:    Vital Signs Last 24 Hrs  T(C): 36.8 (08 May 2025 08:50), Max: 37.1 (07 May 2025 23:18)  T(F): 98.3 (08 May 2025 08:50), Max: 98.7 (07 May 2025 23:18)  HR: 84 (08 May 2025 08:50) (72 - 96)  BP: 125/52 (08 May 2025 08:50) (101/59 - 130/64)  BP(mean): 75 (07 May 2025 22:30) (72 - 85)  RR: 18 (08 May 2025 08:50) (15 - 22)  SpO2: 97% (08 May 2025 08:50) (95% - 99%)    Parameters below as of 08 May 2025 08:50  Patient On (Oxygen Delivery Method): room air        ASSESSMENT/ PLAN    Therapy to  be:	[x ] Continue   [ ] Discontinued   [ ] Change to prn Analgesics    Documentation and Verification of current medications:   [X] Done	[ ] Not done, not elligible    Comments: Continue IV PCA with dilaudid while patient remains NPO.    Progress Note written now but Patient was seen earlier.
Surgery Progress Note     Overnight events: NAEO    Interval/Subjective:  Patient seen and examined. Resting comfortably in bed. +/+. pain is well controlled. ambulating.   __________________________________________________________________  Vitals:  T(C): 37.3 (20:13), Max: 37.3 (20:13)  HR: 89 (20:13) (76 - 92)  BP: 155/73 (20:13) (122/63 - 155/73)  RR: 18 (20:13) (18 - 19)  SpO2: 98% (20:13) (93% - 98%)    I & O's:  IN:    dextrose 5% + lactated ringers: 456 mL    IV PiggyBack: 50 mL    Oral Fluid: 1100 mL  Total IN: 1606 mL    OUT:    Voided (mL): 2350 mL  Total OUT: 2350 mL        05-09-25 @ 07:01  -  05-10-25 @ 07:00  --------------------------------------------------------  OUT:    Voided (mL): unable to calculate mL/kg/hr  Total OUT: unable to calculate mL/kg/hr      05-10-25 @ 07:01  -  05-10-25 @ 22:09  --------------------------------------------------------  OUT:    Voided (mL): unable to calculate mL/kg/hr  Total OUT: unable to calculate mL/kg/hr      Physical Exam:  Constitutional: A&Ox3, NAD  Respiratory: Unlabored breathing  Abdomen: Soft, nondistended, NTTP. No rebound or guarding. Midline incision with prevena. Left subcostal pump pocket C/D/I.  Extremities: WWP, CLIFFORD spontaneously      __________________________________________________________________
Surgery Progress Note     Overnight events: NAEO    Interval/Subjective:  Patient seen and examined. Resting comfortably in bed. Pain is well controlled. Tolerating regular diet. +/+. Prevena removed and steris placed on top.  __________________________________________________________________  Vitals:  T(C): 36.5 (08:07), Max: 37.3 (20:13)  HR: 85 (08:07) (76 - 89)  BP: 148/71 (08:07) (134/60 - 155/73)  RR: 18 (08:07) (18 - 18)  SpO2: 100% (08:07) (95% - 100%)    I & O's:  IN:    Oral Fluid: 1440 mL  Total IN: 1440 mL    OUT:    IV PiggyBack: 0 mL    Voided (mL): 2200 mL  Total OUT: 2200 mL        05-10-25 @ 07:01  -  05-11-25 @ 07:00  --------------------------------------------------------  OUT:    Voided (mL): unable to calculate mL/kg/hr  Total OUT: unable to calculate mL/kg/hr      05-11-25 @ 07:01  -  05-11-25 @ 12:34  --------------------------------------------------------  OUT:    Voided (mL): unable to calculate mL/kg/hr  Total OUT: unable to calculate mL/kg/hr      Physical Exam:  General: NAD, resting comfortably in bed  HEENT: Normocephalic atraumatic  Respiratory: Nonlabored respirations  Cardio: pulse present  Abdomen: soft, nontender, nondistended. midline incision with minimal drainage. Steris placed overtop.  Vascular: extremities are warm and well perfused.       __________________________________________________________________
TEAM [ D ] Surgery Daily Progress Note  =====================================================    SUBJECTIVE: Patient seen and examined at bedside on AM rounds. Patient without complaints. NPO/NGT, denies nausea, vomiting. -Flatus/-BM. OOB/Amublating as tolerated. Denies fever, chills.    ALLERGIES:  No Known Allergies      --------------------------------------------------------------------------------------    MEDICATIONS:    Neurologic Medications  acetaminophen   IVPB .. 1000 milliGRAM(s) IV Intermittent every 8 hours  HYDROmorphone PCA (1 mG/mL) 30 milliLiter(s) PCA Continuous PCA Continuous  HYDROmorphone PCA (1 mG/mL) Rescue Clinician Bolus 0.5 milliGRAM(s) IV Push every 15 minutes PRN for Pain Scale GREATER THAN 6  morphine PF Spinal 0.2 milliGRAM(s) IntraThecal. once  ondansetron Injectable 4 milliGRAM(s) IV Push every 6 hours PRN Nausea    Respiratory Medications    Cardiovascular Medications    Gastrointestinal Medications  dextrose 5% + lactated ringers. 1000 milliLiter(s) IV Continuous <Continuous>  pantoprazole  Injectable 40 milliGRAM(s) IV Push with breakfast    Genitourinary Medications    Hematologic/Oncologic Medications    Antimicrobial/Immunologic Medications    Endocrine/Metabolic Medications    Topical/Other Medications  naloxone Injectable 0.1 milliGRAM(s) IV Push every 3 minutes PRN For ANY of the following changes in patient status:  A. RR LESS THAN 10 breaths per minute, B. Oxygen saturation LESS THAN 90%, C. Sedation score of 6  naloxone Injectable 0.1 milliGRAM(s) IV Push every 3 minutes PRN For ANY of the following changes in patient status:  A. RR LESS THAN 10 breaths per minute, B. Oxygen saturation LESS THAN 90%, C. Sedation score of 6    --------------------------------------------------------------------------------------    VITAL SIGNS:  T(C): 36.7 (05-08-25 @ 04:10), Max: 37.1 (05-07-25 @ 23:18)  HR: 80 (05-08-25 @ 04:10) (72 - 96)  BP: 110/66 (05-08-25 @ 04:10) (101/59 - 130/64)  RR: 18 (05-08-25 @ 04:10) (15 - 22)  SpO2: 99% (05-08-25 @ 04:10) (95% - 99%)  --------------------------------------------------------------------------------------    EXAM    General: NAD, resting in bed comfortably.  Cardiac: regular rate, warm and well perfused  Respiratory: Nonlabored respirations, normal cw expansion.  Abdomen: soft, nontender, softly distended. Prevena midline to suction, NGT to suction, minimal output. Sagrario with blue urine (methylene blue given intra-op).   Extremities: normal strength, FROM, no deformities    --------------------------------------------------------------------------------------    LABS                        11.4   6.41  )-----------( 179      ( 08 May 2025 06:42 )             35.0   05-08    135  |  102  |  22  ----------------------------<  201[H]  4.3   |  21[L]  |  1.18    Ca    8.2[L]      08 May 2025 06:42  Phos  4.3     05-08  Mg     2.20     05-08    TPro  5.6[L]  /  Alb  2.9[L]  /  TBili  0.5  /  DBili  x   /  AST  47[H]  /  ALT  29  /  AlkPhos  52  05-08    --------------------------------------------------------------------------------------    INS AND OUTS:    05-07-25 @ 07:01  -  05-08-25 @ 07:00  --------------------------------------------------------  IN: 420 mL / OUT: 495 mL / NET: -75 mL      --------------------------------------------------------------------------------------

## 2025-05-11 NOTE — DISCHARGE NOTE NURSING/CASE MANAGEMENT/SOCIAL WORK - PATIENT PORTAL LINK FT
You can access the FollowMyHealth Patient Portal offered by [facility] by registering at the following website: [email or web address]. By joining [employer]’s FollowMyHealth portal, you will also be able to view your health information using other applications (apps) compatible with our system.

## 2025-05-13 PROBLEM — C22.1 INTRAHEPATIC BILE DUCT CARCINOMA: Chronic | Status: ACTIVE | Noted: 2025-05-01

## 2025-05-13 PROBLEM — Z92.21 PERSONAL HISTORY OF ANTINEOPLASTIC CHEMOTHERAPY: Chronic | Status: ACTIVE | Noted: 2025-05-01

## 2025-05-14 ENCOUNTER — APPOINTMENT (OUTPATIENT)
Dept: HEMATOLOGY ONCOLOGY | Facility: CLINIC | Age: 45
End: 2025-05-14
Payer: MEDICAID

## 2025-05-14 ENCOUNTER — NON-APPOINTMENT (OUTPATIENT)
Age: 45
End: 2025-05-14

## 2025-05-14 DIAGNOSIS — Z15.89 GENETIC SUSCEPTIBILITY TO OTHER DISEASE: ICD-10-CM

## 2025-05-14 DIAGNOSIS — C54.1 MALIGNANT NEOPLASM OF ENDOMETRIUM: ICD-10-CM

## 2025-05-14 PROCEDURE — 99204 OFFICE O/P NEW MOD 45 MIN: CPT | Mod: 95

## 2025-05-19 PROBLEM — Z15.89 PALB2 GENE MUTATION POSITIVE: Status: ACTIVE | Noted: 2025-05-19

## 2025-05-20 NOTE — ASU PREOP CHECKLIST - LAST TOOK
Pulmonary / Critical Care Progress Note      Patient Name: Sumeet Gomes  : 1962  MRN: [id number]  Primary Care Physician:  Mirtha Alexander APRN  Date of admission: 2025    Subjective   Subjective   Follow-up for COPD with acute exacerbation, acute on chronic hypoxic and hypercapnic respiratory failure    Over past 24 hours: Remains on nebulizers such as Brovana, Pulmicort, Yupelri.  Continues with Daliresp..  Continues with Lasix 20 mg oral daily.  Currently on Solu-Medrol 40 mg every 8    No acute events overnight.     This morning,   Currently on 3 L nasal cannula  Resting in bed  Feels well today  Denies any chest pain chest tightness  No fever or chills  No nausea or vomiting  Underwent overnight oximetry    Objective   Objective     Vitals:   Temp:  [97.7 °F (36.5 °C)-98.4 °F (36.9 °C)] 97.7 °F (36.5 °C)  Heart Rate:  [45-65] 50  Resp:  [18-22] 18  BP: ()/(60-65) 98/65  Flow (L/min) (Oxygen Therapy):  [3] 3  Physical Exam   Vital Signs Reviewed   General:  WDWN, Alert, NAD.  Pleasant male, lying in bed  HEENT:  PERRL, EOMI.  OP, nares clear, no sinus tenderness  Neck:  Supple, no JVD, no thyromegaly  Chest:  good aeration, clear to auscultation bilaterally, tympanic to percussion bilaterally, no work of breathing noted  CV: RRR, no MGR, pulses 2+, equal.  Abd:  Soft, NT, ND, + BS, no HSM  EXT:  no clubbing, no cyanosis, no edema  Neuro:  A&Ox3, CN grossly intact, no focal deficits.  Skin: No rashes or lesions noted      Result Review    Result Review:  I have personally reviewed the results from the time of this admission to 2025 10:57 EDT and agree with these findings:  [x]  Laboratory  [x]  Microbiology  [x]  Radiology  [x]  EKG/Telemetry   [x]  Cardiology/Vascular   []  Pathology  []  Old records  []  Other:  Most notable findings include:         Lab 25  0452 25  0500 25  0501 25  0307 252 25   WBC 5.75 7.61 8.63 3.77  --  5.68    HEMOGLOBIN 12.0* 12.3* 12.8* 13.9  --  15.6   HEMATOCRIT 39.7 39.7 41.3 46.0  --  52.2*   PLATELETS 95* 108* 116* 117*  --  128*   SODIUM 140 138 141 140  --  144   POTASSIUM 4.4 4.7 4.3 5.0  --  4.8   CHLORIDE 97* 95* 95* 92*  --  92*   CO2 40.8* 39.1* 38.8* 39.7*  --  44.3*   BUN 20 19 21 16  --  13   CREATININE 0.56* 0.45* 0.57* 0.59* 0.61 0.54*   GLUCOSE 85 129* 138* 120*  --  120*   CALCIUM 8.8 9.3 9.5 10.0  --  10.4   TOTAL PROTEIN  --   --   --   --   --  7.2   ALBUMIN  --   --   --   --   --  4.9   GLOBULIN  --   --   --   --   --  2.3              CT Chest With Contrast Diagnostic  Result Date: 5/17/2025  CT CHEST W CONTRAST DIAGNOSTIC Date of Exam: 5/17/2025 5:35 PM EDT Indication: resp failure, increased work of breathing. Comparison: Chest CT without contrast dated 5/13/2024 and single view chest radiograph from 5/16/2025 Technique: Axial CT images were obtained of the chest after the uneventful intravenous administration of iodinated contrast.  Reconstructed coronal and sagittal images were also obtained. Automated exposure control and iterative construction methods were  used. Findings: There is moderate to severe emphysema and stable linear areas of scarring in the right lower lobe and lingula. No suspicious pulmonary nodules or consolidations. No interstitial thickening or bronchial wall thickening. The central tracheobronchial tree is widely patent. No pleural or pericardial effusions or pneumothorax. No pathologically enlarged thoracic or axillary adenopathy. Heart size is within normal limits. Coronary artery calcifications are present. The thoracic aorta is not aneurysmal. Pulmonary arteries are normal in caliber and well-opacified. There is a air debris level in the proximal esophagus at the level of the thoracic inlet. The esophagus is poorly distended. The stomach is distended and debris-filled. A distinct mass at the GE junction is not identified on this exam. Limited images of the upper  abdomen are otherwise unremarkable except for partially visualized cyst in the right kidney. No acute bony abnormalities or aggressive appearing focal osseous lesions.     Impression: Impression: 1.Moderate to severe emphysema with stable areas of scarring in the right lower lobe and lingula. No acute intrathoracic abnormality. 2.The esophagus is poorly distended and there is an air debris level in the proximal esophagus at the level of the thoracic inlet, likely due to gastroesophageal reflux or possibly esophageal dysmotility. The stomach is distended and debris-filled. A distinct mass at the GE junction is not identified on this exam. Electronically Signed: Hansel Triplett DO  5/17/2025 6:04 PM EDT  Workstation ID: [id number]        Assessment & Plan   Assessment / Plan     Active Hospital Problems:  Active Hospital Problems    Diagnosis    • **Acute on chronic respiratory failure with hypoxia and hypercapnia          Impression:   Severe COPD with exacerbation  Acute on chronic hypoxic and hypercapnic respiratory failure  CO2 narcosis  Pulmonary hypertension  Tobacco abuse    Plan:   NIPPV with naps and at nighttime  Continue Pulmicort and Brovana  Continue Yupelri  Daliresp daily  Taper steroids  Continue Lasix  Bronchopulmonary hygiene with incentive spirometry  S/p overnight oximetry  Pulmonary follow-up postdischarge     Mr. Gomes will need noninvasive ventilation for acute on chronic hypercapnic respiratory failure (CO2 90) secondary to severe COPD, FEV1 18% of predicted. Bilevel, Bilevel ST, ASV and Bilevel with VAPS have been tried/considered and ruled out. The patient requires volume targeted mode of ventilation that is not available on less costly bilevel devices. A noninvasive ventilator is necessary to deliver targeted tidal/minute ventilation and provide a back up rate to ensure adequate ventilation and prevent hypoventilation. Alarms and battery backup (only available on noninvasive ventilator) are  necessary for patient safety. Volume assured noninvasive ventilation for home use is crucial to decrease hospitalization and improve survival. Failure to provide NIV in the home will place the patient at increased risk of unplanned expensive rehospitalization, acute on chronic respiratory failure and mortality.     VTE Prophylaxis:  Pharmacologic VTE prophylaxis orders are present.    CODE STATUS:   Code Status (Patient has no pulse and is not breathing): No CPR (Do Not Attempt to Resuscitate)  Medical Interventions (Patient has pulse or is breathing): Limited Support  Medical Intervention Limits: No intubation (DNI)  Level Of Support Discussed With: Patient    I personally reviewed pertinent labs, imaging and provider notes. Discussed with bedside nurse and will discuss with primary service.     Electronically signed by ZULY Ames, 5/20/2025, 10:57 EDT.  This visit was performed by both a physician and an APC.  I personally evaluated and examined the patient.  I performed all aspects of MDM as documented.  I have reviewed and confirmed the accuracy of the patient's history as documented in this note and I have reexamined the patient and the results are consistent with the previously documented exam.  I have updated the documentation as necessary.   Electronically signed by Uriel Root MD, 05/20/25, 11:52 AM EDT.         clears

## 2025-05-21 ENCOUNTER — NON-APPOINTMENT (OUTPATIENT)
Age: 45
End: 2025-05-21

## 2025-05-21 ENCOUNTER — APPOINTMENT (OUTPATIENT)
Dept: INFUSION THERAPY | Facility: HOSPITAL | Age: 45
End: 2025-05-21

## 2025-05-21 ENCOUNTER — APPOINTMENT (OUTPATIENT)
Dept: HEMATOLOGY ONCOLOGY | Facility: CLINIC | Age: 45
End: 2025-05-21
Payer: MEDICAID

## 2025-05-21 DIAGNOSIS — C22.1 INTRAHEPATIC BILE DUCT CARCINOMA: ICD-10-CM

## 2025-05-21 LAB — SURGICAL PATHOLOGY STUDY: SIGNIFICANT CHANGE UP

## 2025-05-21 PROCEDURE — 99213 OFFICE O/P EST LOW 20 MIN: CPT

## 2025-05-22 ENCOUNTER — APPOINTMENT (OUTPATIENT)
Dept: SURGICAL ONCOLOGY | Facility: CLINIC | Age: 45
End: 2025-05-22
Payer: MEDICAID

## 2025-05-22 VITALS
DIASTOLIC BLOOD PRESSURE: 78 MMHG | BODY MASS INDEX: 44.76 KG/M2 | WEIGHT: 228 LBS | HEIGHT: 60 IN | SYSTOLIC BLOOD PRESSURE: 130 MMHG | HEART RATE: 88 BPM | OXYGEN SATURATION: 99 %

## 2025-05-22 DIAGNOSIS — S30.851A SUPERFICIAL FOREIGN BODY OF ABDOMINAL WALL, INITIAL ENCOUNTER: ICD-10-CM

## 2025-05-22 DIAGNOSIS — L08.9 SUPERFICIAL FOREIGN BODY OF ABDOMINAL WALL, INITIAL ENCOUNTER: ICD-10-CM

## 2025-05-22 PROCEDURE — 99214 OFFICE O/P EST MOD 30 MIN: CPT

## 2025-05-22 RX ORDER — CEPHALEXIN 500 MG/1
500 CAPSULE ORAL
Qty: 14 | Refills: 0 | Status: ACTIVE | COMMUNITY
Start: 2025-05-22 | End: 1900-01-01

## 2025-05-27 ENCOUNTER — APPOINTMENT (OUTPATIENT)
Dept: GYNECOLOGIC ONCOLOGY | Facility: CLINIC | Age: 45
End: 2025-05-27
Payer: MEDICAID

## 2025-05-27 VITALS
TEMPERATURE: 98 F | WEIGHT: 219 LBS | HEIGHT: 60 IN | BODY MASS INDEX: 43 KG/M2 | HEART RATE: 86 BPM | OXYGEN SATURATION: 98 % | DIASTOLIC BLOOD PRESSURE: 85 MMHG | SYSTOLIC BLOOD PRESSURE: 135 MMHG

## 2025-05-27 DIAGNOSIS — Z85.42 ENCOUNTER FOR FOLLOW-UP EXAMINATION AFTER COMPLETED TREATMENT FOR MALIGNANT NEOPLASM: ICD-10-CM

## 2025-05-27 DIAGNOSIS — Z08 ENCOUNTER FOR FOLLOW-UP EXAMINATION AFTER COMPLETED TREATMENT FOR MALIGNANT NEOPLASM: ICD-10-CM

## 2025-05-27 PROCEDURE — 99214 OFFICE O/P EST MOD 30 MIN: CPT

## 2025-05-27 PROCEDURE — 99459 PELVIC EXAMINATION: CPT

## 2025-05-28 ENCOUNTER — APPOINTMENT (OUTPATIENT)
Dept: NUCLEAR MEDICINE | Facility: HOSPITAL | Age: 45
End: 2025-05-28

## 2025-05-28 ENCOUNTER — OUTPATIENT (OUTPATIENT)
Dept: OUTPATIENT SERVICES | Facility: HOSPITAL | Age: 45
LOS: 1 days | End: 2025-05-28
Payer: MEDICAID

## 2025-05-28 DIAGNOSIS — C22.1 INTRAHEPATIC BILE DUCT CARCINOMA: ICD-10-CM

## 2025-05-28 DIAGNOSIS — Z98.890 OTHER SPECIFIED POSTPROCEDURAL STATES: Chronic | ICD-10-CM

## 2025-05-28 DIAGNOSIS — Z92.89 PERSONAL HISTORY OF OTHER MEDICAL TREATMENT: Chronic | ICD-10-CM

## 2025-05-28 DIAGNOSIS — Z90.710 ACQUIRED ABSENCE OF BOTH CERVIX AND UTERUS: Chronic | ICD-10-CM

## 2025-05-28 PROCEDURE — A9540: CPT

## 2025-05-28 PROCEDURE — 78830 RP LOCLZJ TUM SPECT W/CT 1: CPT

## 2025-05-28 PROCEDURE — 78830 RP LOCLZJ TUM SPECT W/CT 1: CPT | Mod: 26

## 2025-05-29 ENCOUNTER — APPOINTMENT (OUTPATIENT)
Dept: SURGICAL ONCOLOGY | Facility: CLINIC | Age: 45
End: 2025-05-29

## 2025-05-29 VITALS
BODY MASS INDEX: 43 KG/M2 | OXYGEN SATURATION: 99 % | HEART RATE: 88 BPM | HEIGHT: 60 IN | DIASTOLIC BLOOD PRESSURE: 70 MMHG | WEIGHT: 219 LBS | SYSTOLIC BLOOD PRESSURE: 120 MMHG

## 2025-05-29 PROCEDURE — 99214 OFFICE O/P EST MOD 30 MIN: CPT

## 2025-06-02 ENCOUNTER — APPOINTMENT (OUTPATIENT)
Dept: HEMATOLOGY ONCOLOGY | Facility: CLINIC | Age: 45
End: 2025-06-02

## 2025-06-02 ENCOUNTER — RESULT REVIEW (OUTPATIENT)
Age: 45
End: 2025-06-02

## 2025-06-02 ENCOUNTER — NON-APPOINTMENT (OUTPATIENT)
Age: 45
End: 2025-06-02

## 2025-06-02 VITALS
OXYGEN SATURATION: 98 % | DIASTOLIC BLOOD PRESSURE: 77 MMHG | SYSTOLIC BLOOD PRESSURE: 115 MMHG | TEMPERATURE: 98.1 F | HEIGHT: 60 IN | RESPIRATION RATE: 17 BRPM | BODY MASS INDEX: 42.5 KG/M2 | HEART RATE: 87 BPM | WEIGHT: 216.49 LBS

## 2025-06-02 LAB
BASOPHILS # BLD AUTO: 0.02 K/UL — SIGNIFICANT CHANGE UP (ref 0–0.2)
BASOPHILS NFR BLD AUTO: 0.4 % — SIGNIFICANT CHANGE UP (ref 0–2)
EOSINOPHIL # BLD AUTO: 0.17 K/UL — SIGNIFICANT CHANGE UP (ref 0–0.5)
EOSINOPHIL NFR BLD AUTO: 3 % — SIGNIFICANT CHANGE UP (ref 0–6)
HCT VFR BLD CALC: 33.5 % — LOW (ref 34.5–45)
HGB BLD-MCNC: 10.9 G/DL — LOW (ref 11.5–15.5)
IMM GRANULOCYTES NFR BLD AUTO: 0.4 % — SIGNIFICANT CHANGE UP (ref 0–0.9)
LYMPHOCYTES # BLD AUTO: 1.39 K/UL — SIGNIFICANT CHANGE UP (ref 1–3.3)
LYMPHOCYTES # BLD AUTO: 24.4 % — SIGNIFICANT CHANGE UP (ref 13–44)
MCHC RBC-ENTMCNC: 30.8 PG — SIGNIFICANT CHANGE UP (ref 27–34)
MCHC RBC-ENTMCNC: 32.5 G/DL — SIGNIFICANT CHANGE UP (ref 32–36)
MCV RBC AUTO: 94.6 FL — SIGNIFICANT CHANGE UP (ref 80–100)
MONOCYTES # BLD AUTO: 0.79 K/UL — SIGNIFICANT CHANGE UP (ref 0–0.9)
MONOCYTES NFR BLD AUTO: 13.9 % — SIGNIFICANT CHANGE UP (ref 2–14)
NEUTROPHILS # BLD AUTO: 3.3 K/UL — SIGNIFICANT CHANGE UP (ref 1.8–7.4)
NEUTROPHILS NFR BLD AUTO: 57.9 % — SIGNIFICANT CHANGE UP (ref 43–77)
NRBC BLD AUTO-RTO: 0 /100 WBCS — SIGNIFICANT CHANGE UP (ref 0–0)
PLATELET # BLD AUTO: 251 K/UL — SIGNIFICANT CHANGE UP (ref 150–400)
RBC # BLD: 3.54 M/UL — LOW (ref 3.8–5.2)
RBC # FLD: 11.9 % — SIGNIFICANT CHANGE UP (ref 10.3–14.5)
WBC # BLD: 5.69 K/UL — SIGNIFICANT CHANGE UP (ref 3.8–10.5)
WBC # FLD AUTO: 5.69 K/UL — SIGNIFICANT CHANGE UP (ref 3.8–10.5)

## 2025-06-02 PROCEDURE — 99214 OFFICE O/P EST MOD 30 MIN: CPT

## 2025-06-02 PROCEDURE — G2211 COMPLEX E/M VISIT ADD ON: CPT | Mod: NC

## 2025-06-02 RX ORDER — METOCLOPRAMIDE 10 MG/1
10 TABLET ORAL
Qty: 45 | Refills: 3 | Status: ACTIVE | COMMUNITY
Start: 2025-06-02 | End: 1900-01-01

## 2025-06-02 RX ORDER — DEXAMETHASONE 4 MG/1
4 TABLET ORAL
Qty: 24 | Refills: 1 | Status: ACTIVE | COMMUNITY
Start: 2025-06-02 | End: 1900-01-01

## 2025-06-02 RX ORDER — LIDOCAINE AND PRILOCAINE 25; 25 MG/G; MG/G
2.5-2.5 CREAM TOPICAL
Qty: 1 | Refills: 1 | Status: ACTIVE | COMMUNITY
Start: 2025-06-02 | End: 1900-01-01

## 2025-06-03 LAB
ALBUMIN SERPL ELPH-MCNC: 3.8 G/DL
ALP BLD-CCNC: 71 U/L
ALT SERPL-CCNC: 22 U/L
ANION GAP SERPL CALC-SCNC: 13 MMOL/L
AST SERPL-CCNC: 34 U/L
BILIRUB SERPL-MCNC: 0.4 MG/DL
BUN SERPL-MCNC: 19 MG/DL
CALCIUM SERPL-MCNC: 9.3 MG/DL
CANCER AG19-9 SERPL-ACNC: 18 U/ML
CHLORIDE SERPL-SCNC: 104 MMOL/L
CO2 SERPL-SCNC: 23 MMOL/L
CREAT SERPL-MCNC: 0.98 MG/DL
EGFRCR SERPLBLD CKD-EPI 2021: 72 ML/MIN/1.73M2
GLUCOSE SERPL-MCNC: 207 MG/DL
POTASSIUM SERPL-SCNC: 5.2 MMOL/L
PROT SERPL-MCNC: 8.2 G/DL
SODIUM SERPL-SCNC: 141 MMOL/L

## 2025-06-04 ENCOUNTER — APPOINTMENT (OUTPATIENT)
Dept: INFUSION THERAPY | Facility: HOSPITAL | Age: 45
End: 2025-06-04

## 2025-06-06 ENCOUNTER — RESULT REVIEW (OUTPATIENT)
Age: 45
End: 2025-06-06

## 2025-06-06 ENCOUNTER — NON-APPOINTMENT (OUTPATIENT)
Age: 45
End: 2025-06-06

## 2025-06-06 ENCOUNTER — APPOINTMENT (OUTPATIENT)
Dept: INFUSION THERAPY | Facility: HOSPITAL | Age: 45
End: 2025-06-06

## 2025-06-06 DIAGNOSIS — Z51.11 ENCOUNTER FOR ANTINEOPLASTIC CHEMOTHERAPY: ICD-10-CM

## 2025-06-06 DIAGNOSIS — R11.2 NAUSEA WITH VOMITING, UNSPECIFIED: ICD-10-CM

## 2025-06-06 LAB
BASOPHILS # BLD AUTO: 0.03 K/UL — SIGNIFICANT CHANGE UP (ref 0–0.2)
BASOPHILS NFR BLD AUTO: 0.5 % — SIGNIFICANT CHANGE UP (ref 0–2)
EOSINOPHIL # BLD AUTO: 0.11 K/UL — SIGNIFICANT CHANGE UP (ref 0–0.5)
EOSINOPHIL NFR BLD AUTO: 1.8 % — SIGNIFICANT CHANGE UP (ref 0–6)
HCT VFR BLD CALC: 29 % — LOW (ref 34.5–45)
HGB BLD-MCNC: 9.8 G/DL — LOW (ref 11.5–15.5)
IMM GRANULOCYTES NFR BLD AUTO: 0.2 % — SIGNIFICANT CHANGE UP (ref 0–0.9)
LYMPHOCYTES # BLD AUTO: 1.5 K/UL — SIGNIFICANT CHANGE UP (ref 1–3.3)
LYMPHOCYTES # BLD AUTO: 24.9 % — SIGNIFICANT CHANGE UP (ref 13–44)
MCHC RBC-ENTMCNC: 31.2 PG — SIGNIFICANT CHANGE UP (ref 27–34)
MCHC RBC-ENTMCNC: 33.8 G/DL — SIGNIFICANT CHANGE UP (ref 32–36)
MCV RBC AUTO: 92.4 FL — SIGNIFICANT CHANGE UP (ref 80–100)
MONOCYTES # BLD AUTO: 0.76 K/UL — SIGNIFICANT CHANGE UP (ref 0–0.9)
MONOCYTES NFR BLD AUTO: 12.6 % — SIGNIFICANT CHANGE UP (ref 2–14)
NEUTROPHILS # BLD AUTO: 3.61 K/UL — SIGNIFICANT CHANGE UP (ref 1.8–7.4)
NEUTROPHILS NFR BLD AUTO: 60 % — SIGNIFICANT CHANGE UP (ref 43–77)
NRBC BLD AUTO-RTO: 0 /100 WBCS — SIGNIFICANT CHANGE UP (ref 0–0)
PLATELET # BLD AUTO: 224 K/UL — SIGNIFICANT CHANGE UP (ref 150–400)
RBC # BLD: 3.14 M/UL — LOW (ref 3.8–5.2)
RBC # FLD: 12 % — SIGNIFICANT CHANGE UP (ref 10.3–14.5)
WBC # BLD: 6.02 K/UL — SIGNIFICANT CHANGE UP (ref 3.8–10.5)
WBC # FLD AUTO: 6.02 K/UL — SIGNIFICANT CHANGE UP (ref 3.8–10.5)

## 2025-06-09 ENCOUNTER — NON-APPOINTMENT (OUTPATIENT)
Age: 45
End: 2025-06-09

## 2025-06-10 ENCOUNTER — NON-APPOINTMENT (OUTPATIENT)
Age: 45
End: 2025-06-10

## 2025-06-10 ENCOUNTER — APPOINTMENT (OUTPATIENT)
Dept: WOUND CARE | Facility: HOSPITAL | Age: 45
End: 2025-06-10

## 2025-06-10 PROCEDURE — 99204 OFFICE O/P NEW MOD 45 MIN: CPT

## 2025-06-10 PROCEDURE — 99024 POSTOP FOLLOW-UP VISIT: CPT

## 2025-06-13 ENCOUNTER — APPOINTMENT (OUTPATIENT)
Dept: RADIATION ONCOLOGY | Facility: CLINIC | Age: 45
End: 2025-06-13

## 2025-06-16 ENCOUNTER — NON-APPOINTMENT (OUTPATIENT)
Age: 45
End: 2025-06-16

## 2025-06-17 LAB — CULTURE, WOUND AEROBE ANAEROBE: ABNORMAL

## 2025-06-17 RX ORDER — CEFDINIR 300 MG/1
300 CAPSULE ORAL
Qty: 20 | Refills: 1 | Status: ACTIVE | COMMUNITY
Start: 2025-06-16 | End: 1900-01-01

## 2025-06-18 ENCOUNTER — APPOINTMENT (OUTPATIENT)
Dept: HEMATOLOGY ONCOLOGY | Facility: CLINIC | Age: 45
End: 2025-06-18

## 2025-06-20 ENCOUNTER — APPOINTMENT (OUTPATIENT)
Dept: INFUSION THERAPY | Facility: HOSPITAL | Age: 45
End: 2025-06-20

## 2025-06-26 ENCOUNTER — OUTPATIENT (OUTPATIENT)
Dept: OUTPATIENT SERVICES | Facility: HOSPITAL | Age: 45
LOS: 1 days | Discharge: HOME | End: 2025-06-26

## 2025-06-26 DIAGNOSIS — Z92.89 PERSONAL HISTORY OF OTHER MEDICAL TREATMENT: Chronic | ICD-10-CM

## 2025-06-26 DIAGNOSIS — Z90.710 ACQUIRED ABSENCE OF BOTH CERVIX AND UTERUS: Chronic | ICD-10-CM

## 2025-06-26 DIAGNOSIS — C22.1 INTRAHEPATIC BILE DUCT CARCINOMA: ICD-10-CM

## 2025-06-26 DIAGNOSIS — Z98.890 OTHER SPECIFIED POSTPROCEDURAL STATES: Chronic | ICD-10-CM

## 2025-07-01 ENCOUNTER — APPOINTMENT (OUTPATIENT)
Dept: WOUND CARE | Facility: HOSPITAL | Age: 45
End: 2025-07-01
Payer: MEDICAID

## 2025-07-01 PROCEDURE — 99213 OFFICE O/P EST LOW 20 MIN: CPT | Mod: 25

## 2025-07-01 PROCEDURE — 11042 DBRDMT SUBQ TIS 1ST 20SQCM/<: CPT

## 2025-07-02 ENCOUNTER — APPOINTMENT (OUTPATIENT)
Dept: HEMATOLOGY ONCOLOGY | Facility: CLINIC | Age: 45
End: 2025-07-02

## 2025-07-02 ENCOUNTER — RESULT REVIEW (OUTPATIENT)
Age: 45
End: 2025-07-02

## 2025-07-02 LAB
BASOPHILS # BLD AUTO: 0.04 K/UL — SIGNIFICANT CHANGE UP (ref 0–0.2)
BASOPHILS NFR BLD AUTO: 0.6 % — SIGNIFICANT CHANGE UP (ref 0–2)
EOSINOPHIL # BLD AUTO: 0.07 K/UL — SIGNIFICANT CHANGE UP (ref 0–0.5)
EOSINOPHIL NFR BLD AUTO: 1.1 % — SIGNIFICANT CHANGE UP (ref 0–6)
HCT VFR BLD CALC: 34 % — LOW (ref 34.5–45)
HGB BLD-MCNC: 11.2 G/DL — LOW (ref 11.5–15.5)
IMM GRANULOCYTES NFR BLD AUTO: 1 % — HIGH (ref 0–0.9)
LYMPHOCYTES # BLD AUTO: 1.63 K/UL — SIGNIFICANT CHANGE UP (ref 1–3.3)
LYMPHOCYTES # BLD AUTO: 26 % — SIGNIFICANT CHANGE UP (ref 13–44)
MCHC RBC-ENTMCNC: 30.9 PG — SIGNIFICANT CHANGE UP (ref 27–34)
MCHC RBC-ENTMCNC: 32.9 G/DL — SIGNIFICANT CHANGE UP (ref 32–36)
MCV RBC AUTO: 93.9 FL — SIGNIFICANT CHANGE UP (ref 80–100)
MONOCYTES # BLD AUTO: 0.85 K/UL — SIGNIFICANT CHANGE UP (ref 0–0.9)
MONOCYTES NFR BLD AUTO: 13.5 % — SIGNIFICANT CHANGE UP (ref 2–14)
NEUTROPHILS # BLD AUTO: 3.63 K/UL — SIGNIFICANT CHANGE UP (ref 1.8–7.4)
NEUTROPHILS NFR BLD AUTO: 57.8 % — SIGNIFICANT CHANGE UP (ref 43–77)
NRBC BLD AUTO-RTO: 0 /100 WBCS — SIGNIFICANT CHANGE UP (ref 0–0)
PLATELET # BLD AUTO: 229 K/UL — SIGNIFICANT CHANGE UP (ref 150–400)
RBC # BLD: 3.62 M/UL — LOW (ref 3.8–5.2)
RBC # FLD: 13.3 % — SIGNIFICANT CHANGE UP (ref 10.3–14.5)
WBC # BLD: 6.28 K/UL — SIGNIFICANT CHANGE UP (ref 3.8–10.5)
WBC # FLD AUTO: 6.28 K/UL — SIGNIFICANT CHANGE UP (ref 3.8–10.5)

## 2025-07-03 ENCOUNTER — NON-APPOINTMENT (OUTPATIENT)
Age: 45
End: 2025-07-03

## 2025-07-03 ENCOUNTER — APPOINTMENT (OUTPATIENT)
Dept: HEMATOLOGY ONCOLOGY | Facility: CLINIC | Age: 45
End: 2025-07-03

## 2025-07-03 ENCOUNTER — APPOINTMENT (OUTPATIENT)
Dept: INFUSION THERAPY | Facility: HOSPITAL | Age: 45
End: 2025-07-03

## 2025-07-03 DIAGNOSIS — Z51.11 ENCOUNTER FOR ANTINEOPLASTIC CHEMOTHERAPY: ICD-10-CM

## 2025-07-03 DIAGNOSIS — R11.2 NAUSEA WITH VOMITING, UNSPECIFIED: ICD-10-CM

## 2025-07-03 LAB
ALBUMIN SERPL ELPH-MCNC: 3.8 G/DL
ALP BLD-CCNC: 76 U/L
ALT SERPL-CCNC: 45 U/L
ANION GAP SERPL CALC-SCNC: 11 MMOL/L
AST SERPL-CCNC: 28 U/L
BILIRUB SERPL-MCNC: 0.4 MG/DL
BUN SERPL-MCNC: 23 MG/DL
CALCIUM SERPL-MCNC: 9.2 MG/DL
CHLORIDE SERPL-SCNC: 101 MMOL/L
CO2 SERPL-SCNC: 24 MMOL/L
CREAT SERPL-MCNC: 0.84 MG/DL
EGFRCR SERPLBLD CKD-EPI 2021: 87 ML/MIN/1.73M2
GLUCOSE SERPL-MCNC: 269 MG/DL
POTASSIUM SERPL-SCNC: 5.1 MMOL/L
PROT SERPL-MCNC: 6.9 G/DL
SODIUM SERPL-SCNC: 136 MMOL/L

## 2025-07-06 PROBLEM — T81.321D ABDOMINAL WOUND DEHISCENCE, SUBSEQUENT ENCOUNTER: Status: ACTIVE | Noted: 2025-06-12

## 2025-07-16 ENCOUNTER — APPOINTMENT (OUTPATIENT)
Dept: HEMATOLOGY ONCOLOGY | Facility: CLINIC | Age: 45
End: 2025-07-16
Payer: MEDICAID

## 2025-07-16 ENCOUNTER — RESULT REVIEW (OUTPATIENT)
Age: 45
End: 2025-07-16

## 2025-07-16 VITALS
BODY MASS INDEX: 42.07 KG/M2 | WEIGHT: 215.39 LBS | OXYGEN SATURATION: 99 % | DIASTOLIC BLOOD PRESSURE: 82 MMHG | SYSTOLIC BLOOD PRESSURE: 132 MMHG | RESPIRATION RATE: 17 BRPM | TEMPERATURE: 98 F | HEART RATE: 66 BPM

## 2025-07-16 LAB
ALBUMIN SERPL ELPH-MCNC: 3.8 G/DL
ALP BLD-CCNC: 89 U/L
ALT SERPL-CCNC: 97 U/L
ANION GAP SERPL CALC-SCNC: 12 MMOL/L
AST SERPL-CCNC: 56 U/L
BASOPHILS # BLD AUTO: 0.01 K/UL — SIGNIFICANT CHANGE UP (ref 0–0.2)
BASOPHILS NFR BLD AUTO: 0.2 % — SIGNIFICANT CHANGE UP (ref 0–2)
BILIRUB SERPL-MCNC: 0.4 MG/DL
BUN SERPL-MCNC: 24 MG/DL
CALCIUM SERPL-MCNC: 9.2 MG/DL
CANCER AG19-9 SERPL-ACNC: 58 U/ML
CHLORIDE SERPL-SCNC: 105 MMOL/L
CO2 SERPL-SCNC: 20 MMOL/L
CREAT SERPL-MCNC: 0.74 MG/DL
EGFRCR SERPLBLD CKD-EPI 2021: 101 ML/MIN/1.73M2
EOSINOPHIL # BLD AUTO: 0.05 K/UL — SIGNIFICANT CHANGE UP (ref 0–0.5)
EOSINOPHIL NFR BLD AUTO: 1.1 % — SIGNIFICANT CHANGE UP (ref 0–6)
GLUCOSE SERPL-MCNC: 310 MG/DL
HCT VFR BLD CALC: 34.9 % — SIGNIFICANT CHANGE UP (ref 34.5–45)
HGB BLD-MCNC: 11.4 G/DL — LOW (ref 11.5–15.5)
IMM GRANULOCYTES NFR BLD AUTO: 0.7 % — SIGNIFICANT CHANGE UP (ref 0–0.9)
LYMPHOCYTES # BLD AUTO: 1.24 K/UL — SIGNIFICANT CHANGE UP (ref 1–3.3)
LYMPHOCYTES # BLD AUTO: 27.7 % — SIGNIFICANT CHANGE UP (ref 13–44)
MCHC RBC-ENTMCNC: 30.4 PG — SIGNIFICANT CHANGE UP (ref 27–34)
MCHC RBC-ENTMCNC: 32.7 G/DL — SIGNIFICANT CHANGE UP (ref 32–36)
MCV RBC AUTO: 93.1 FL — SIGNIFICANT CHANGE UP (ref 80–100)
MONOCYTES # BLD AUTO: 0.58 K/UL — SIGNIFICANT CHANGE UP (ref 0–0.9)
MONOCYTES NFR BLD AUTO: 12.9 % — SIGNIFICANT CHANGE UP (ref 2–14)
NEUTROPHILS # BLD AUTO: 2.57 K/UL — SIGNIFICANT CHANGE UP (ref 1.8–7.4)
NEUTROPHILS NFR BLD AUTO: 57.4 % — SIGNIFICANT CHANGE UP (ref 43–77)
NRBC BLD AUTO-RTO: 0 /100 WBCS — SIGNIFICANT CHANGE UP (ref 0–0)
PLATELET # BLD AUTO: 172 K/UL — SIGNIFICANT CHANGE UP (ref 150–400)
POTASSIUM SERPL-SCNC: 5.7 MMOL/L
PROT SERPL-MCNC: 6.8 G/DL
RBC # BLD: 3.75 M/UL — LOW (ref 3.8–5.2)
RBC # FLD: 14.3 % — SIGNIFICANT CHANGE UP (ref 10.3–14.5)
SODIUM SERPL-SCNC: 138 MMOL/L
WBC # BLD: 4.48 K/UL — SIGNIFICANT CHANGE UP (ref 3.8–10.5)
WBC # FLD AUTO: 4.48 K/UL — SIGNIFICANT CHANGE UP (ref 3.8–10.5)

## 2025-07-16 PROCEDURE — 99213 OFFICE O/P EST LOW 20 MIN: CPT

## 2025-07-16 RX ORDER — PANTOPRAZOLE 20 MG/1
20 TABLET, DELAYED RELEASE ORAL
Qty: 30 | Refills: 3 | Status: ACTIVE | COMMUNITY
Start: 2025-07-16 | End: 1900-01-01

## 2025-07-17 ENCOUNTER — NON-APPOINTMENT (OUTPATIENT)
Age: 45
End: 2025-07-17

## 2025-07-17 LAB
ESTIMATED AVERAGE GLUCOSE: 298 MG/DL
HBA1C MFR BLD HPLC: 12 %

## 2025-07-17 NOTE — PATIENT PROFILE ADULT - NSPROGENOTHERPROVIDER_GEN_A_NUR
[facility] [place]  [address] [place] [address]    Michael Richards  1948  [id number]     Assessment and plan     This 76 year old male with a past medical history of DM and morbid obesity presents to the pulmonary clinic for evaluation after he was placed on oxygen    Shortness of breath;   The patient has been on oxygen therapy for less than a year due to shortness of breath. He reports no smoking history but has a history of heart problems and asthma. He experiences chest tightness and wheezing. His oxygen levels drop both during the day and at night.   Unclear why he is on oxygen   Unfortunately, he is a poor historian as well   - Reviewed multiple recent hospitalization notes   - O2 eval   - PFTs/methacholine/MIP/MEP   - Continue rehab     Asthma;   The patient has a history of asthma, with symptoms including chest tightness and wheezing. There is no known family history of asthma.   - Will likely add ICS depending on results     Physical deconditioning;   The patient reports decreased leg strength and limited mobility, affecting his ability to walk. He resides in a nursing facility and has been there for 5 years. Although he is not currently participating in a formal rehabilitation program, he attends therapy sessions in the afternoon.   - The plan includes encouraging continued participation in therapy to improve leg strength and mobility.     Orders Placed This Encounter    PFT Complete PFT([place] w/BD,Diff Cap,Lung Vol), Oxygen Evaluation, Respiratory Muscle Evaluation(MIP/MEP)    PFT Bronchoprovocation/Mannitol Challenge (includes spirometry)      Return in about 1 year (around 7/17/2026).   Nereyda Kennedy MD  ================================================================    HPI    Michael is a 76 year old year old male with PMH as described below who is being seen in the pulmonary clinic today at the request of Nereyda Kennedy MD for the following reason(s) or complaint(s):  Consultation, Breathing Problem, and COPD        The patient presents for shortness of breath.    Shortness of Breath  - He has been using oxygen therapy due to fluctuating oxygen levels.  - He reports no snoring.    History of Smoking  - He has a history of smoking 16 years ago but does not currently smoke.    Heart Condition and Asthma  - He has a known heart condition and asthma.  - He experiences chest tightness and wheezing.    Limited Mobility  - His mobility is limited due to leg weakness, preventing walking.  - He resides in a nursing facility and attends therapy sessions in the afternoons.    SOCIAL HISTORY  - Does not smoke currently  - Tried smoking 16 years ago but did not continue     ================================================================  Review of Systems  As documented above.    Objective   Physical Exam  General: Alert.    Respiratory: Normal respiratory effort.    Musculoskeletal: Gait: normal.  Psychiatric: Mood is normal and affect is normal.     No acute distress.  Regular rate and rhythm, no murmurs.  Clear to auscultation bilaterally, no wheezing or crackles.       I have personally reviewed and interpreted recent labs. Pertinent data is summarized below:     Labs: Eos 250     Imagin. No PE is detected.  There is nondiagnostic evaluation of the subsegmental arteries  in both lower lungs due to motion artifact.  2. No evidence of infection in the chest.     Pulmonary Function Tests:   No results found for this or any previous visit.    Sleep Study: none        ================================================================         none

## 2025-07-18 ENCOUNTER — APPOINTMENT (OUTPATIENT)
Dept: INTERNAL MEDICINE | Facility: CLINIC | Age: 45
End: 2025-07-18
Payer: MEDICAID

## 2025-07-18 ENCOUNTER — APPOINTMENT (OUTPATIENT)
Dept: INFUSION THERAPY | Facility: HOSPITAL | Age: 45
End: 2025-07-18

## 2025-07-18 ENCOUNTER — RESULT REVIEW (OUTPATIENT)
Age: 45
End: 2025-07-18

## 2025-07-18 VITALS
HEIGHT: 62.2 IN | TEMPERATURE: 97.2 F | BODY MASS INDEX: 39.06 KG/M2 | DIASTOLIC BLOOD PRESSURE: 73 MMHG | RESPIRATION RATE: 16 BRPM | OXYGEN SATURATION: 99 % | HEART RATE: 74 BPM | SYSTOLIC BLOOD PRESSURE: 107 MMHG | WEIGHT: 214.95 LBS

## 2025-07-18 PROBLEM — E11.65 UNCONTROLLED TYPE 2 DIABETES MELLITUS WITH HYPERGLYCEMIA: Status: ACTIVE | Noted: 2025-07-18

## 2025-07-18 PROCEDURE — 99215 OFFICE O/P EST HI 40 MIN: CPT

## 2025-07-18 RX ORDER — BLOOD-GLUCOSE METER
W/DEVICE KIT MISCELLANEOUS
Qty: 1 | Refills: 0 | Status: ACTIVE | COMMUNITY
Start: 2025-07-18 | End: 1900-01-01

## 2025-07-18 RX ORDER — METFORMIN HYDROCHLORIDE 500 MG/1
500 TABLET, EXTENDED RELEASE ORAL DAILY
Qty: 30 | Refills: 3 | Status: ACTIVE | COMMUNITY
Start: 2025-07-18 | End: 1900-01-01

## 2025-07-18 RX ORDER — TIRZEPATIDE 2.5 MG/.5ML
2.5 INJECTION, SOLUTION SUBCUTANEOUS
Qty: 4 | Refills: 2 | Status: ACTIVE | COMMUNITY
Start: 2025-07-18 | End: 1900-01-01

## 2025-07-18 RX ORDER — BLOOD-GLUCOSE METER
KIT MISCELLANEOUS TWICE DAILY
Qty: 100 | Refills: 3 | Status: ACTIVE | COMMUNITY
Start: 2025-07-18 | End: 1900-01-01

## 2025-07-18 RX ORDER — LANCETS 33 GAUGE
EACH MISCELLANEOUS
Qty: 90 | Refills: 3 | Status: ACTIVE | COMMUNITY
Start: 2025-07-18 | End: 1900-01-01

## 2025-07-23 ENCOUNTER — NON-APPOINTMENT (OUTPATIENT)
Age: 45
End: 2025-07-23

## 2025-07-26 ENCOUNTER — APPOINTMENT (OUTPATIENT)
Dept: CT IMAGING | Facility: IMAGING CENTER | Age: 45
End: 2025-07-26
Payer: MEDICAID

## 2025-07-26 ENCOUNTER — OUTPATIENT (OUTPATIENT)
Dept: OUTPATIENT SERVICES | Facility: HOSPITAL | Age: 45
LOS: 1 days | End: 2025-07-26
Payer: MEDICAID

## 2025-07-26 DIAGNOSIS — C22.1 INTRAHEPATIC BILE DUCT CARCINOMA: ICD-10-CM

## 2025-07-26 DIAGNOSIS — Z98.890 OTHER SPECIFIED POSTPROCEDURAL STATES: Chronic | ICD-10-CM

## 2025-07-26 DIAGNOSIS — Z90.710 ACQUIRED ABSENCE OF BOTH CERVIX AND UTERUS: Chronic | ICD-10-CM

## 2025-07-26 DIAGNOSIS — Z92.89 PERSONAL HISTORY OF OTHER MEDICAL TREATMENT: Chronic | ICD-10-CM

## 2025-07-26 PROCEDURE — 74177 CT ABD & PELVIS W/CONTRAST: CPT | Mod: 26

## 2025-07-26 PROCEDURE — 71260 CT THORAX DX C+: CPT | Mod: 26

## 2025-07-26 PROCEDURE — 74177 CT ABD & PELVIS W/CONTRAST: CPT

## 2025-07-26 PROCEDURE — 71260 CT THORAX DX C+: CPT

## 2025-07-28 ENCOUNTER — NON-APPOINTMENT (OUTPATIENT)
Age: 45
End: 2025-07-28

## 2025-07-28 DIAGNOSIS — K65.1 PERITONEAL ABSCESS: ICD-10-CM

## 2025-07-29 ENCOUNTER — APPOINTMENT (OUTPATIENT)
Dept: INTERNAL MEDICINE | Facility: CLINIC | Age: 45
End: 2025-07-29

## 2025-07-29 ENCOUNTER — OUTPATIENT (OUTPATIENT)
Dept: OUTPATIENT SERVICES | Facility: HOSPITAL | Age: 45
LOS: 1 days | End: 2025-07-29
Payer: MEDICAID

## 2025-07-29 ENCOUNTER — APPOINTMENT (OUTPATIENT)
Dept: ULTRASOUND IMAGING | Facility: IMAGING CENTER | Age: 45
End: 2025-07-29
Payer: MEDICAID

## 2025-07-29 ENCOUNTER — RESULT REVIEW (OUTPATIENT)
Age: 45
End: 2025-07-29

## 2025-07-29 DIAGNOSIS — Z90.710 ACQUIRED ABSENCE OF BOTH CERVIX AND UTERUS: Chronic | ICD-10-CM

## 2025-07-29 DIAGNOSIS — Z98.890 OTHER SPECIFIED POSTPROCEDURAL STATES: Chronic | ICD-10-CM

## 2025-07-29 DIAGNOSIS — Z92.89 PERSONAL HISTORY OF OTHER MEDICAL TREATMENT: Chronic | ICD-10-CM

## 2025-07-29 DIAGNOSIS — K65.1 PERITONEAL ABSCESS: ICD-10-CM

## 2025-07-29 DIAGNOSIS — C22.1 INTRAHEPATIC BILE DUCT CARCINOMA: ICD-10-CM

## 2025-07-29 PROCEDURE — 75989 ABSCESS DRAINAGE UNDER X-RAY: CPT | Mod: 26

## 2025-07-29 PROCEDURE — 75989 ABSCESS DRAINAGE UNDER X-RAY: CPT

## 2025-07-30 ENCOUNTER — APPOINTMENT (OUTPATIENT)
Dept: HEMATOLOGY ONCOLOGY | Facility: CLINIC | Age: 45
End: 2025-07-30

## 2025-08-01 ENCOUNTER — APPOINTMENT (OUTPATIENT)
Dept: HEMATOLOGY ONCOLOGY | Facility: CLINIC | Age: 45
End: 2025-08-01
Payer: MEDICAID

## 2025-08-01 ENCOUNTER — RESULT REVIEW (OUTPATIENT)
Age: 45
End: 2025-08-01

## 2025-08-01 ENCOUNTER — APPOINTMENT (OUTPATIENT)
Dept: RADIOLOGY | Facility: IMAGING CENTER | Age: 45
End: 2025-08-01
Payer: MEDICAID

## 2025-08-01 ENCOUNTER — OUTPATIENT (OUTPATIENT)
Dept: OUTPATIENT SERVICES | Facility: HOSPITAL | Age: 45
LOS: 1 days | End: 2025-08-01
Payer: MEDICAID

## 2025-08-01 ENCOUNTER — APPOINTMENT (OUTPATIENT)
Dept: INFUSION THERAPY | Facility: HOSPITAL | Age: 45
End: 2025-08-01

## 2025-08-01 DIAGNOSIS — Z92.89 PERSONAL HISTORY OF OTHER MEDICAL TREATMENT: Chronic | ICD-10-CM

## 2025-08-01 DIAGNOSIS — C22.1 INTRAHEPATIC BILE DUCT CARCINOMA: ICD-10-CM

## 2025-08-01 DIAGNOSIS — Z98.890 OTHER SPECIFIED POSTPROCEDURAL STATES: Chronic | ICD-10-CM

## 2025-08-01 LAB
ALBUMIN SERPL ELPH-MCNC: 3.8 G/DL — SIGNIFICANT CHANGE UP (ref 3.3–5)
ALP SERPL-CCNC: 87 U/L — SIGNIFICANT CHANGE UP (ref 40–120)
ALT FLD-CCNC: 82 U/L — HIGH (ref 10–45)
ANION GAP SERPL CALC-SCNC: 12 MMOL/L — SIGNIFICANT CHANGE UP (ref 5–17)
AST SERPL-CCNC: 36 U/L — SIGNIFICANT CHANGE UP (ref 10–40)
BASOPHILS # BLD AUTO: 0.02 K/UL — SIGNIFICANT CHANGE UP (ref 0–0.2)
BASOPHILS NFR BLD AUTO: 0.4 % — SIGNIFICANT CHANGE UP (ref 0–2)
BILIRUB SERPL-MCNC: 0.4 MG/DL — SIGNIFICANT CHANGE UP (ref 0.2–1.2)
BUN SERPL-MCNC: 34 MG/DL — HIGH (ref 7–23)
CALCIUM SERPL-MCNC: 9.1 MG/DL — SIGNIFICANT CHANGE UP (ref 8.4–10.5)
CHLORIDE SERPL-SCNC: 100 MMOL/L — SIGNIFICANT CHANGE UP (ref 96–108)
CO2 SERPL-SCNC: 24 MMOL/L — SIGNIFICANT CHANGE UP (ref 22–31)
CREAT SERPL-MCNC: 0.7 MG/DL — SIGNIFICANT CHANGE UP (ref 0.5–1.3)
EGFR: 108 ML/MIN/1.73M2 — SIGNIFICANT CHANGE UP
EGFR: 108 ML/MIN/1.73M2 — SIGNIFICANT CHANGE UP
EOSINOPHIL # BLD AUTO: 0.03 K/UL — SIGNIFICANT CHANGE UP (ref 0–0.5)
EOSINOPHIL NFR BLD AUTO: 0.5 % — SIGNIFICANT CHANGE UP (ref 0–6)
GLUCOSE SERPL-MCNC: 379 MG/DL — HIGH (ref 70–99)
HCT VFR BLD CALC: 31 % — LOW (ref 34.5–45)
HGB BLD-MCNC: 10.1 G/DL — LOW (ref 11.5–15.5)
IMM GRANULOCYTES NFR BLD AUTO: 0.7 % — SIGNIFICANT CHANGE UP (ref 0–0.9)
LYMPHOCYTES # BLD AUTO: 1.21 K/UL — SIGNIFICANT CHANGE UP (ref 1–3.3)
LYMPHOCYTES # BLD AUTO: 21.6 % — SIGNIFICANT CHANGE UP (ref 13–44)
MCHC RBC-ENTMCNC: 31 PG — SIGNIFICANT CHANGE UP (ref 27–34)
MCHC RBC-ENTMCNC: 32.6 G/DL — SIGNIFICANT CHANGE UP (ref 32–36)
MCV RBC AUTO: 95.1 FL — SIGNIFICANT CHANGE UP (ref 80–100)
MONOCYTES # BLD AUTO: 0.8 K/UL — SIGNIFICANT CHANGE UP (ref 0–0.9)
MONOCYTES NFR BLD AUTO: 14.3 % — HIGH (ref 2–14)
NEUTROPHILS # BLD AUTO: 3.51 K/UL — SIGNIFICANT CHANGE UP (ref 1.8–7.4)
NEUTROPHILS NFR BLD AUTO: 62.5 % — SIGNIFICANT CHANGE UP (ref 43–77)
NRBC BLD AUTO-RTO: 0 /100 WBCS — SIGNIFICANT CHANGE UP (ref 0–0)
PLATELET # BLD AUTO: 174 K/UL — SIGNIFICANT CHANGE UP (ref 150–400)
POTASSIUM SERPL-MCNC: 4.3 MMOL/L — SIGNIFICANT CHANGE UP (ref 3.5–5.3)
POTASSIUM SERPL-SCNC: 4.3 MMOL/L — SIGNIFICANT CHANGE UP (ref 3.5–5.3)
PROT SERPL-MCNC: 6.9 G/DL — SIGNIFICANT CHANGE UP (ref 6–8.3)
RBC # BLD: 3.26 M/UL — LOW (ref 3.8–5.2)
RBC # FLD: 15.5 % — HIGH (ref 10.3–14.5)
SODIUM SERPL-SCNC: 136 MMOL/L — SIGNIFICANT CHANGE UP (ref 135–145)
WBC # BLD: 5.61 K/UL — SIGNIFICANT CHANGE UP (ref 3.8–10.5)
WBC # FLD AUTO: 5.61 K/UL — SIGNIFICANT CHANGE UP (ref 3.8–10.5)

## 2025-08-01 PROCEDURE — 74019 RADEX ABDOMEN 2 VIEWS: CPT | Mod: 26

## 2025-08-01 PROCEDURE — 99214 OFFICE O/P EST MOD 30 MIN: CPT

## 2025-08-01 PROCEDURE — 74019 RADEX ABDOMEN 2 VIEWS: CPT

## 2025-08-02 ENCOUNTER — APPOINTMENT (OUTPATIENT)
Dept: MRI IMAGING | Facility: IMAGING CENTER | Age: 45
End: 2025-08-02
Payer: MEDICAID

## 2025-08-02 ENCOUNTER — OUTPATIENT (OUTPATIENT)
Dept: OUTPATIENT SERVICES | Facility: HOSPITAL | Age: 45
LOS: 1 days | End: 2025-08-02
Payer: MEDICAID

## 2025-08-02 DIAGNOSIS — Z90.710 ACQUIRED ABSENCE OF BOTH CERVIX AND UTERUS: Chronic | ICD-10-CM

## 2025-08-02 DIAGNOSIS — C22.1 INTRAHEPATIC BILE DUCT CARCINOMA: ICD-10-CM

## 2025-08-02 DIAGNOSIS — Z98.890 OTHER SPECIFIED POSTPROCEDURAL STATES: Chronic | ICD-10-CM

## 2025-08-02 PROCEDURE — A9585: CPT

## 2025-08-02 PROCEDURE — 72197 MRI PELVIS W/O & W/DYE: CPT | Mod: 26

## 2025-08-02 PROCEDURE — 72197 MRI PELVIS W/O & W/DYE: CPT

## 2025-08-02 PROCEDURE — 74183 MRI ABD W/O CNTR FLWD CNTR: CPT

## 2025-08-02 PROCEDURE — 74183 MRI ABD W/O CNTR FLWD CNTR: CPT | Mod: 26

## 2025-08-04 ENCOUNTER — NON-APPOINTMENT (OUTPATIENT)
Age: 45
End: 2025-08-04

## 2025-08-05 ENCOUNTER — APPOINTMENT (OUTPATIENT)
Dept: WOUND CARE | Facility: HOSPITAL | Age: 45
End: 2025-08-05
Payer: MEDICAID

## 2025-08-05 VITALS
DIASTOLIC BLOOD PRESSURE: 85 MMHG | SYSTOLIC BLOOD PRESSURE: 143 MMHG | TEMPERATURE: 98 F | HEART RATE: 83 BPM | OXYGEN SATURATION: 99 % | RESPIRATION RATE: 16 BRPM

## 2025-08-05 DIAGNOSIS — S31.109A UNSPECIFIED OPEN WOUND OF ABDOMINAL WALL, UNSPECIFIED QUADRANT W/OUT PENETRATION INTO PERITONEAL CAVITY, INITIAL ENCOUNTER: ICD-10-CM

## 2025-08-05 DIAGNOSIS — C22.1 INTRAHEPATIC BILE DUCT CARCINOMA: ICD-10-CM

## 2025-08-05 PROCEDURE — 99213 OFFICE O/P EST LOW 20 MIN: CPT | Mod: 57

## 2025-08-11 ENCOUNTER — TRANSCRIPTION ENCOUNTER (OUTPATIENT)
Age: 45
End: 2025-08-11

## 2025-08-11 ENCOUNTER — APPOINTMENT (OUTPATIENT)
Dept: INTERNAL MEDICINE | Facility: CLINIC | Age: 45
End: 2025-08-11

## 2025-08-13 ENCOUNTER — APPOINTMENT (OUTPATIENT)
Dept: HEMATOLOGY ONCOLOGY | Facility: CLINIC | Age: 45
End: 2025-08-13

## 2025-08-13 ENCOUNTER — APPOINTMENT (OUTPATIENT)
Dept: INTERNAL MEDICINE | Facility: CLINIC | Age: 45
End: 2025-08-13

## 2025-08-13 VITALS
TEMPERATURE: 98 F | DIASTOLIC BLOOD PRESSURE: 72 MMHG | BODY MASS INDEX: 40.86 KG/M2 | WEIGHT: 224.85 LBS | OXYGEN SATURATION: 98 % | RESPIRATION RATE: 17 BRPM | SYSTOLIC BLOOD PRESSURE: 113 MMHG

## 2025-08-13 PROCEDURE — 99213 OFFICE O/P EST LOW 20 MIN: CPT

## 2025-08-13 PROCEDURE — G2211 COMPLEX E/M VISIT ADD ON: CPT | Mod: NC

## 2025-08-13 RX ORDER — CEPHALEXIN 500 MG/1
500 TABLET ORAL EVERY 6 HOURS
Qty: 28 | Refills: 0 | Status: ACTIVE | COMMUNITY
Start: 2025-08-01 | End: 1900-01-01

## 2025-08-13 RX ORDER — PANTOPRAZOLE 20 MG/1
20 TABLET, DELAYED RELEASE ORAL
Refills: 0 | Status: DISCONTINUED | COMMUNITY
End: 2025-08-13

## 2025-08-15 ENCOUNTER — RESULT REVIEW (OUTPATIENT)
Age: 45
End: 2025-08-15

## 2025-08-15 ENCOUNTER — APPOINTMENT (OUTPATIENT)
Dept: INFUSION THERAPY | Facility: HOSPITAL | Age: 45
End: 2025-08-15

## 2025-08-15 LAB
ALBUMIN SERPL ELPH-MCNC: 3.5 G/DL — SIGNIFICANT CHANGE UP (ref 3.3–5)
ALP SERPL-CCNC: 65 U/L — SIGNIFICANT CHANGE UP (ref 40–120)
ALT FLD-CCNC: 34 U/L — SIGNIFICANT CHANGE UP (ref 10–45)
ANION GAP SERPL CALC-SCNC: 11 MMOL/L — SIGNIFICANT CHANGE UP (ref 5–17)
AST SERPL-CCNC: 28 U/L — SIGNIFICANT CHANGE UP (ref 10–40)
BASOPHILS # BLD AUTO: 0.03 K/UL — SIGNIFICANT CHANGE UP (ref 0–0.2)
BASOPHILS NFR BLD AUTO: 0.5 % — SIGNIFICANT CHANGE UP (ref 0–2)
BILIRUB SERPL-MCNC: 0.5 MG/DL — SIGNIFICANT CHANGE UP (ref 0.2–1.2)
BUN SERPL-MCNC: 20 MG/DL — SIGNIFICANT CHANGE UP (ref 7–23)
CALCIUM SERPL-MCNC: 8.7 MG/DL — SIGNIFICANT CHANGE UP (ref 8.4–10.5)
CANCER AG19-9 SERPL-ACNC: 42 U/ML — HIGH
CHLORIDE SERPL-SCNC: 102 MMOL/L — SIGNIFICANT CHANGE UP (ref 96–108)
CO2 SERPL-SCNC: 24 MMOL/L — SIGNIFICANT CHANGE UP (ref 22–31)
CREAT SERPL-MCNC: 0.63 MG/DL — SIGNIFICANT CHANGE UP (ref 0.5–1.3)
EGFR: 111 ML/MIN/1.73M2 — SIGNIFICANT CHANGE UP
EGFR: 111 ML/MIN/1.73M2 — SIGNIFICANT CHANGE UP
EOSINOPHIL # BLD AUTO: 0.04 K/UL — SIGNIFICANT CHANGE UP (ref 0–0.5)
EOSINOPHIL NFR BLD AUTO: 0.6 % — SIGNIFICANT CHANGE UP (ref 0–6)
GLUCOSE SERPL-MCNC: 259 MG/DL — HIGH (ref 70–99)
HCT VFR BLD CALC: 27.4 % — LOW (ref 34.5–45)
HGB BLD-MCNC: 9 G/DL — LOW (ref 11.5–15.5)
IMM GRANULOCYTES NFR BLD AUTO: 0.6 % — SIGNIFICANT CHANGE UP (ref 0–0.9)
LYMPHOCYTES # BLD AUTO: 1.55 K/UL — SIGNIFICANT CHANGE UP (ref 1–3.3)
LYMPHOCYTES # BLD AUTO: 24.5 % — SIGNIFICANT CHANGE UP (ref 13–44)
MCHC RBC-ENTMCNC: 31 PG — SIGNIFICANT CHANGE UP (ref 27–34)
MCHC RBC-ENTMCNC: 32.8 G/DL — SIGNIFICANT CHANGE UP (ref 32–36)
MCV RBC AUTO: 94.5 FL — SIGNIFICANT CHANGE UP (ref 80–100)
MONOCYTES # BLD AUTO: 0.67 K/UL — SIGNIFICANT CHANGE UP (ref 0–0.9)
MONOCYTES NFR BLD AUTO: 10.6 % — SIGNIFICANT CHANGE UP (ref 2–14)
NEUTROPHILS # BLD AUTO: 4 K/UL — SIGNIFICANT CHANGE UP (ref 1.8–7.4)
NEUTROPHILS NFR BLD AUTO: 63.2 % — SIGNIFICANT CHANGE UP (ref 43–77)
NRBC BLD AUTO-RTO: 0 /100 WBCS — SIGNIFICANT CHANGE UP (ref 0–0)
PLATELET # BLD AUTO: 267 K/UL — SIGNIFICANT CHANGE UP (ref 150–400)
POTASSIUM SERPL-MCNC: 4 MMOL/L — SIGNIFICANT CHANGE UP (ref 3.5–5.3)
POTASSIUM SERPL-SCNC: 4 MMOL/L — SIGNIFICANT CHANGE UP (ref 3.5–5.3)
PROT SERPL-MCNC: 6.6 G/DL — SIGNIFICANT CHANGE UP (ref 6–8.3)
RBC # BLD: 2.9 M/UL — LOW (ref 3.8–5.2)
RBC # FLD: 14.7 % — HIGH (ref 10.3–14.5)
SODIUM SERPL-SCNC: 137 MMOL/L — SIGNIFICANT CHANGE UP (ref 135–145)
WBC # BLD: 6.33 K/UL — SIGNIFICANT CHANGE UP (ref 3.8–10.5)
WBC # FLD AUTO: 6.33 K/UL — SIGNIFICANT CHANGE UP (ref 3.8–10.5)

## 2025-08-18 ENCOUNTER — APPOINTMENT (OUTPATIENT)
Dept: INFUSION THERAPY | Facility: HOSPITAL | Age: 45
End: 2025-08-18

## 2025-08-26 ENCOUNTER — APPOINTMENT (OUTPATIENT)
Dept: GYNECOLOGIC ONCOLOGY | Facility: CLINIC | Age: 45
End: 2025-08-26

## 2025-08-28 ENCOUNTER — APPOINTMENT (OUTPATIENT)
Dept: INFUSION THERAPY | Facility: HOSPITAL | Age: 45
End: 2025-08-28

## 2025-08-29 ENCOUNTER — RESULT REVIEW (OUTPATIENT)
Age: 45
End: 2025-08-29

## 2025-08-29 ENCOUNTER — APPOINTMENT (OUTPATIENT)
Dept: INFUSION THERAPY | Facility: HOSPITAL | Age: 45
End: 2025-08-29

## 2025-09-03 PROBLEM — S30.1XXA ABDOMINAL WALL HEMATOMA: Status: ACTIVE | Noted: 2025-09-03

## 2025-09-04 ENCOUNTER — APPOINTMENT (OUTPATIENT)
Dept: INFUSION THERAPY | Facility: HOSPITAL | Age: 45
End: 2025-09-04

## 2025-09-05 ENCOUNTER — APPOINTMENT (OUTPATIENT)
Dept: INFUSION THERAPY | Facility: HOSPITAL | Age: 45
End: 2025-09-05

## 2025-09-08 ENCOUNTER — RESULT REVIEW (OUTPATIENT)
Age: 45
End: 2025-09-08

## 2025-09-08 ENCOUNTER — APPOINTMENT (OUTPATIENT)
Dept: INFUSION THERAPY | Facility: HOSPITAL | Age: 45
End: 2025-09-08

## 2025-09-09 ENCOUNTER — APPOINTMENT (OUTPATIENT)
Dept: HEMATOLOGY ONCOLOGY | Facility: CLINIC | Age: 45
End: 2025-09-09

## 2025-09-11 ENCOUNTER — APPOINTMENT (OUTPATIENT)
Dept: INFUSION THERAPY | Facility: HOSPITAL | Age: 45
End: 2025-09-11

## 2025-09-12 ENCOUNTER — RESULT REVIEW (OUTPATIENT)
Age: 45
End: 2025-09-12

## 2025-09-12 ENCOUNTER — APPOINTMENT (OUTPATIENT)
Dept: HEMATOLOGY ONCOLOGY | Facility: CLINIC | Age: 45
End: 2025-09-12
Payer: MEDICAID

## 2025-09-12 ENCOUNTER — APPOINTMENT (OUTPATIENT)
Dept: INFUSION THERAPY | Facility: HOSPITAL | Age: 45
End: 2025-09-12

## 2025-09-12 ENCOUNTER — NON-APPOINTMENT (OUTPATIENT)
Age: 45
End: 2025-09-12

## 2025-09-12 DIAGNOSIS — C54.1 MALIGNANT NEOPLASM OF ENDOMETRIUM: ICD-10-CM

## 2025-09-12 PROCEDURE — 99213 OFFICE O/P EST LOW 20 MIN: CPT

## 2025-09-18 ENCOUNTER — APPOINTMENT (OUTPATIENT)
Dept: HEMATOLOGY ONCOLOGY | Facility: CLINIC | Age: 45
End: 2025-09-18

## 2025-09-20 ENCOUNTER — APPOINTMENT (OUTPATIENT)
Dept: CT IMAGING | Facility: IMAGING CENTER | Age: 45
End: 2025-09-20
Payer: MEDICAID

## 2025-09-20 PROCEDURE — 74177 CT ABD & PELVIS W/CONTRAST: CPT | Mod: 26

## (undated) DEVICE — VENODYNE/SCD SLEEVE CALF MEDIUM

## (undated) DEVICE — TROCAR SURGIQUEST AIRSEAL 5MM X 100MM

## (undated) DEVICE — TUBING STRYKEFLOW II SUCTION / IRRIGATOR

## (undated) DEVICE — SUT BOOT STANDARD (YELLOW) 5 PAIR

## (undated) DEVICE — SUT MONOCRYL 4-0 27" PS-2 UNDYED

## (undated) DEVICE — XI SEAL UNIVERSIAL 5-12MM

## (undated) DEVICE — ELCTR BOVIE TIP BLADE INSULATED 6.5" EDGE

## (undated) DEVICE — SUT VLOC 180 2-0 6" GS-22 GREEN

## (undated) DEVICE — SUT CHROMIC 1 27" BP

## (undated) DEVICE — TUBING AIRSEAL TRI-LUMEN FILTERED

## (undated) DEVICE — GLV 6 PROTEXIS (WHITE)

## (undated) DEVICE — SUT VLOC 180 0 9" GS-21 GREEN

## (undated) DEVICE — LIGASURE IMPACT

## (undated) DEVICE — PACK ROBOTIC LIJ

## (undated) DEVICE — POSITIONER PATIENT SAFETY STRAP 3X60"

## (undated) DEVICE — DRSG PREVENA PEEL & PLACE KIT 20CM

## (undated) DEVICE — GOWN XL

## (undated) DEVICE — SUT SILK 3-0 18" SH (POP-OFF)

## (undated) DEVICE — WARMING BLANKET LOWER ADULT

## (undated) DEVICE — SUT VICRYL PLUS 0 27" CT-2 UNDYED

## (undated) DEVICE — ELCTR AQUAMANTYS BIPOLAR SEALER 6.0

## (undated) DEVICE — ELCTR GROUNDING PAD ADULT COVIDIEN

## (undated) DEVICE — UTERINE MANIPULATOR CONMED VCARE MED 34MM

## (undated) DEVICE — DRSG TAPE UMBILICAL COTTON 2" X 30 X 1/8"

## (undated) DEVICE — PACK D&C

## (undated) DEVICE — PACK CARDIOVASCULAR UNIVERSAL

## (undated) DEVICE — NDL OBTURA 25G

## (undated) DEVICE — GLV 6.5 PROTEXIS (WHITE)

## (undated) DEVICE — SOL IRR POUR H2O 1500ML

## (undated) DEVICE — LUBRICATING JELLY ONESHOT 1.25OZ

## (undated) DEVICE — DRAPE COVER SNAP 36X30"

## (undated) DEVICE — SUT SILK 2-0 30" TIES

## (undated) DEVICE — LABELS BLANK W PEN

## (undated) DEVICE — XI DRAPE COLUMN

## (undated) DEVICE — POSITIONER STRAP ARMBOARD VELCRO TS-30

## (undated) DEVICE — SUT POLYSORB 0 60" TIES UNDYED

## (undated) DEVICE — SPONGE PEANUT AUTO COUNT

## (undated) DEVICE — UTERINE MANIPULATOR MPM MEDICAL ZUMI 4.5MM

## (undated) DEVICE — BLADE SURGICAL #11 CARBON

## (undated) DEVICE — STAPLER COVIDIEN ENDO GIA SHORT HANDLE

## (undated) DEVICE — SYR LUER LOK 20CC

## (undated) DEVICE — GOWN LG

## (undated) DEVICE — XI ARM NEEDLE DRIVER LARGE

## (undated) DEVICE — DRSG DERMABOND 0.7ML

## (undated) DEVICE — XI TIP COVER

## (undated) DEVICE — SUT SILK 0 18" TIES

## (undated) DEVICE — SUT PROLENE 3-0 36" SH

## (undated) DEVICE — PROTECTOR HEEL / ELBOW FLUFFY

## (undated) DEVICE — XI ARM FORCEP FENESTRATED BIPOLAR 8MM

## (undated) DEVICE — DRSG TEGADERM 4 X 4.75"

## (undated) DEVICE — LUBRICANT INST ELECTROLUBE Z SOLUTION

## (undated) DEVICE — SUT PROLENE 4-0 36" RB-1

## (undated) DEVICE — APPLICATOR VISTASEAL LAP DUAL 35CM RIGID

## (undated) DEVICE — UTERINE MANIPULATOR CONMED VCARE LG 37MM

## (undated) DEVICE — ELCTR E/S NEEDLE 0.75"

## (undated) DEVICE — XI ARM FORCEP PROGRASP 8MM

## (undated) DEVICE — POSITIONER FOAM HEAD CRADLE (PINK)

## (undated) DEVICE — XI ARM SCISSOR MONO CURVED

## (undated) DEVICE — DRSG TEGADERM 4X4.75"

## (undated) DEVICE — BASIN SET DOUBLE

## (undated) DEVICE — XI ARM NEEDLE DRIVER MEGA

## (undated) DEVICE — DRSG STERISTRIPS 0.5 X 4"

## (undated) DEVICE — SYR LUER LOK 10CC

## (undated) DEVICE — D HELP - CLEARVIEW CLEARIFY SYSTEM

## (undated) DEVICE — ENDOCATCH 10MM SPECIMEN POUCH

## (undated) DEVICE — XI VESSEL SEALER

## (undated) DEVICE — DRSG GAUZE PACKTNER ROLL

## (undated) DEVICE — POSITIONER PINK PAD PIGAZZI SYSTEM

## (undated) DEVICE — DRSG BENZOIN 0.6CC

## (undated) DEVICE — GLV 8 PROTEXIS (WHITE)

## (undated) DEVICE — DRAPE TOWEL BLUE 17" X 24"

## (undated) DEVICE — Device

## (undated) DEVICE — SPECULUM VAGINAL MED DISP

## (undated) DEVICE — SUT SILK 3-0 18" TIES

## (undated) DEVICE — SUT VLOC 180 3-0 9" V-20 GREEN

## (undated) DEVICE — SUT PROLENE 5-0 36" RB-1

## (undated) DEVICE — DRAPE 3/4 SHEET 52X76"

## (undated) DEVICE — SUT SILK 3-0 30" TIES

## (undated) DEVICE — PACK MAJOR ABDOMINAL WITH LAP

## (undated) DEVICE — ELCTR BOVIE PENCIL SMOKE EVACUATION

## (undated) DEVICE — SUT CHROMIC 2-0 36" CT-1

## (undated) DEVICE — PACK MINOR WITH LAP

## (undated) DEVICE — UTERINE MANIPULATOR CONMED VCARE SM 32MM

## (undated) DEVICE — FOLEY TRAY 16FR 5CC LF UMETER CLOSED

## (undated) DEVICE — DRAPE THYROID 77" X 123"

## (undated) DEVICE — DRSG TELFA 3 X 8

## (undated) DEVICE — XI SYNCHROSEAL VESSEL SEALER 8MM

## (undated) DEVICE — TROCAR SURGIQUEST AIRSEAL 8MMX100MM

## (undated) DEVICE — PACK PERI GYN

## (undated) DEVICE — SOL IRR POUR NS 0.9% 1500ML

## (undated) DEVICE — SUT SILK 2-0 24" TIES

## (undated) DEVICE — LIJ/LIA-ARGON BEAM COAGULATOR #4 2016L253: Type: DURABLE MEDICAL EQUIPMENT

## (undated) DEVICE — ELCTR BOVIE TIP BLADE INSULATED 2.75" EDGE

## (undated) DEVICE — XI DRAPE ARM

## (undated) DEVICE — DRSG PREVENA PLUS SYSTEM

## (undated) DEVICE — TOURNIQUET SET SURE-SNARE 22FR (2 TUBES, 2 UMBILICAL TAPES, 2 PLASTIC SNARES) 5"

## (undated) DEVICE — SUT PROLENE 2-0 30" CT-2

## (undated) DEVICE — POSITIONER PURPLE ARM ONE STEP (LARGE)

## (undated) DEVICE — SUT PDS II 1 48" TP-1

## (undated) DEVICE — DRAPE WARMING SOLUTION 44 X 44"